# Patient Record
Sex: FEMALE | Race: WHITE | NOT HISPANIC OR LATINO | Employment: PART TIME | URBAN - METROPOLITAN AREA
[De-identification: names, ages, dates, MRNs, and addresses within clinical notes are randomized per-mention and may not be internally consistent; named-entity substitution may affect disease eponyms.]

---

## 2018-09-11 ENCOUNTER — APPOINTMENT (OUTPATIENT)
Dept: URGENT CARE | Facility: CLINIC | Age: 33
End: 2018-09-11

## 2018-09-11 DIAGNOSIS — Z02.1 PRE-EMPLOYMENT HEALTH SCREENING EXAMINATION: Primary | ICD-10-CM

## 2018-09-11 PROCEDURE — 86480 TB TEST CELL IMMUN MEASURE: CPT | Performed by: PHYSICIAN ASSISTANT

## 2018-09-13 LAB
ANNOTATION COMMENT IMP: NORMAL
GAMMA INTERFERON BACKGROUND BLD IA-ACNC: 0.05 IU/ML
M TB IFN-G BLD-IMP: NEGATIVE
M TB IFN-G CD4+ BCKGRND COR BLD-ACNC: 0 IU/ML
M TB IFN-G CD4+ T-CELLS BLD-ACNC: 0.05 IU/ML
MITOGEN IGNF BLD-ACNC: 7.14 IU/ML
QUANTIFERON-TB GOLD IN TUBE: NORMAL
SERVICE CMNT-IMP: NORMAL

## 2018-10-12 ENCOUNTER — INITIAL PRENATAL (OUTPATIENT)
Dept: OBGYN CLINIC | Facility: CLINIC | Age: 33
End: 2018-10-12
Payer: COMMERCIAL

## 2018-10-12 ENCOUNTER — LAB REQUISITION (OUTPATIENT)
Dept: LAB | Facility: HOSPITAL | Age: 33
End: 2018-10-12
Payer: COMMERCIAL

## 2018-10-12 ENCOUNTER — LAB (OUTPATIENT)
Dept: LAB | Facility: AMBULARY SURGERY CENTER | Age: 33
End: 2018-10-12
Attending: OBSTETRICS & GYNECOLOGY
Payer: COMMERCIAL

## 2018-10-12 VITALS
SYSTOLIC BLOOD PRESSURE: 120 MMHG | BODY MASS INDEX: 32.09 KG/M2 | WEIGHT: 192.6 LBS | HEART RATE: 85 BPM | HEIGHT: 65 IN | DIASTOLIC BLOOD PRESSURE: 80 MMHG

## 2018-10-12 DIAGNOSIS — Z23 NEED FOR INFLUENZA VACCINATION: Primary | ICD-10-CM

## 2018-10-12 DIAGNOSIS — Z34.01 ENCOUNTER FOR SUPERVISION OF NORMAL FIRST PREGNANCY IN FIRST TRIMESTER: ICD-10-CM

## 2018-10-12 DIAGNOSIS — Z3A.01 LESS THAN 8 WEEKS GESTATION OF PREGNANCY: ICD-10-CM

## 2018-10-12 LAB
ABO GROUP BLD: NORMAL
BASOPHILS # BLD AUTO: 0.02 THOUSANDS/ΜL (ref 0–0.1)
BASOPHILS NFR BLD AUTO: 0 % (ref 0–1)
BILIRUB UR QL STRIP: NEGATIVE
BLD GP AB SCN SERPL QL: NEGATIVE
CLARITY UR: CLEAR
COLOR UR: YELLOW
EOSINOPHIL # BLD AUTO: 0.05 THOUSAND/ΜL (ref 0–0.61)
EOSINOPHIL NFR BLD AUTO: 1 % (ref 0–6)
ERYTHROCYTE [DISTWIDTH] IN BLOOD BY AUTOMATED COUNT: 11.9 % (ref 11.6–15.1)
GLUCOSE UR STRIP-MCNC: NEGATIVE MG/DL
HBV SURFACE AG SER QL: NORMAL
HCT VFR BLD AUTO: 40.2 % (ref 34.8–46.1)
HGB BLD-MCNC: 13.5 G/DL (ref 11.5–15.4)
HGB UR QL STRIP.AUTO: NEGATIVE
IMM GRANULOCYTES # BLD AUTO: 0.01 THOUSAND/UL (ref 0–0.2)
IMM GRANULOCYTES NFR BLD AUTO: 0 % (ref 0–2)
KETONES UR STRIP-MCNC: NEGATIVE MG/DL
LEUKOCYTE ESTERASE UR QL STRIP: NEGATIVE
LYMPHOCYTES # BLD AUTO: 1.32 THOUSANDS/ΜL (ref 0.6–4.47)
LYMPHOCYTES NFR BLD AUTO: 22 % (ref 14–44)
MCH RBC QN AUTO: 32.1 PG (ref 26.8–34.3)
MCHC RBC AUTO-ENTMCNC: 33.6 G/DL (ref 31.4–37.4)
MCV RBC AUTO: 96 FL (ref 82–98)
MONOCYTES # BLD AUTO: 0.47 THOUSAND/ΜL (ref 0.17–1.22)
MONOCYTES NFR BLD AUTO: 8 % (ref 4–12)
NEUTROPHILS # BLD AUTO: 4.2 THOUSANDS/ΜL (ref 1.85–7.62)
NEUTS SEG NFR BLD AUTO: 69 % (ref 43–75)
NITRITE UR QL STRIP: NEGATIVE
NRBC BLD AUTO-RTO: 0 /100 WBCS
PH UR STRIP.AUTO: 6.5 [PH] (ref 4.5–8)
PLATELET # BLD AUTO: 261 THOUSANDS/UL (ref 149–390)
PMV BLD AUTO: 10.4 FL (ref 8.9–12.7)
PROT UR STRIP-MCNC: NEGATIVE MG/DL
RBC # BLD AUTO: 4.21 MILLION/UL (ref 3.81–5.12)
RH BLD: POSITIVE
RUBV IGG SERPL IA-ACNC: 80.9 IU/ML
SP GR UR STRIP.AUTO: 1.02 (ref 1–1.03)
SPECIMEN EXPIRATION DATE: NORMAL
UROBILINOGEN UR QL STRIP.AUTO: 0.2 E.U./DL
WBC # BLD AUTO: 6.07 THOUSAND/UL (ref 4.31–10.16)

## 2018-10-12 PROCEDURE — 36415 COLL VENOUS BLD VENIPUNCTURE: CPT

## 2018-10-12 PROCEDURE — 86787 VARICELLA-ZOSTER ANTIBODY: CPT

## 2018-10-12 PROCEDURE — OBC: Performed by: OBSTETRICS & GYNECOLOGY

## 2018-10-12 PROCEDURE — 81003 URINALYSIS AUTO W/O SCOPE: CPT

## 2018-10-12 PROCEDURE — 90686 IIV4 VACC NO PRSV 0.5 ML IM: CPT | Performed by: OBSTETRICS & GYNECOLOGY

## 2018-10-12 PROCEDURE — 87086 URINE CULTURE/COLONY COUNT: CPT

## 2018-10-12 PROCEDURE — 90471 IMMUNIZATION ADMIN: CPT | Performed by: OBSTETRICS & GYNECOLOGY

## 2018-10-12 PROCEDURE — 80081 OBSTETRIC PANEL INC HIV TSTG: CPT

## 2018-10-12 PROCEDURE — 86778 TOXOPLASMA ANTIBODY IGM: CPT

## 2018-10-12 PROCEDURE — 86777 TOXOPLASMA ANTIBODY: CPT

## 2018-10-12 NOTE — PROGRESS NOTES
OB intake complete  Prenatal labs ordered including - prenatal panel, varicella, toxplasmosis, and ultrasound  S/S of pregnancy - nausea, vomiting, breast tenderness, and fatigue  Genetic screening reviewed -- unsure, cpt codes given  PHQ 9 screening results - 0  FOB involved and supportive   Flu vaccine given

## 2018-10-13 LAB
BACTERIA UR CULT: NORMAL
CLINICAL COMMENT: NORMAL
T GONDII IGG SERPL IA-ACNC: <3 IU/ML (ref 0–7.1)
T GONDII IGM SER IA-ACNC: <3 AU/ML (ref 0–7.9)

## 2018-10-15 ENCOUNTER — HOSPITAL ENCOUNTER (OUTPATIENT)
Dept: RADIOLOGY | Facility: HOSPITAL | Age: 33
Discharge: HOME | End: 2018-10-15
Attending: OBSTETRICS & GYNECOLOGY
Payer: COMMERCIAL

## 2018-10-15 DIAGNOSIS — Z3A.01 LESS THAN 8 WEEKS GESTATION OF PREGNANCY: ICD-10-CM

## 2018-10-15 DIAGNOSIS — Z34.01 ENCOUNTER FOR SUPERVISION OF NORMAL FIRST PREGNANCY IN FIRST TRIMESTER: ICD-10-CM

## 2018-10-15 LAB
HIV 1+2 AB+HIV1 P24 AG SERPL QL IA: NORMAL
RPR SER QL: NORMAL

## 2018-10-15 PROCEDURE — 76801 OB US < 14 WKS SINGLE FETUS: CPT

## 2018-10-16 ENCOUNTER — DOCUMENTATION (OUTPATIENT)
Dept: OBGYN CLINIC | Facility: CLINIC | Age: 33
End: 2018-10-16

## 2018-10-16 LAB — VZV IGG SER IA-ACNC: NORMAL

## 2018-10-23 ENCOUNTER — TELEPHONE (OUTPATIENT)
Dept: OBGYN CLINIC | Facility: CLINIC | Age: 33
End: 2018-10-23

## 2018-10-23 DIAGNOSIS — O21.9 NAUSEA AND VOMITING DURING PREGNANCY: Primary | ICD-10-CM

## 2018-10-23 RX ORDER — ONDANSETRON 4 MG/1
4 TABLET, FILM COATED ORAL EVERY 8 HOURS PRN
Qty: 20 TABLET | Refills: 0 | Status: SHIPPED | OUTPATIENT
Start: 2018-10-23 | End: 2019-02-25

## 2018-10-23 NOTE — TELEPHONE ENCOUNTER
9w1d c/o nausea and vomiting 4-5 times a day  Has tried Vit B6 and Unisom  Edy Griffin is making her way to drowsy  Reviewed tips for coping with nausea and vomiting  Would like something else to help with nausea  Routing to provider for advise

## 2018-11-06 ENCOUNTER — ROUTINE PRENATAL (OUTPATIENT)
Dept: OBGYN CLINIC | Facility: CLINIC | Age: 33
End: 2018-11-06

## 2018-11-06 VITALS — DIASTOLIC BLOOD PRESSURE: 74 MMHG | BODY MASS INDEX: 30.95 KG/M2 | WEIGHT: 186 LBS | SYSTOLIC BLOOD PRESSURE: 124 MMHG

## 2018-11-06 DIAGNOSIS — Z34.01 ENCOUNTER FOR SUPERVISION OF NORMAL FIRST PREGNANCY IN FIRST TRIMESTER: Primary | ICD-10-CM

## 2018-11-06 DIAGNOSIS — Z12.4 CERVICAL CANCER SCREENING: ICD-10-CM

## 2018-11-06 DIAGNOSIS — Z11.3 SCREENING FOR STD (SEXUALLY TRANSMITTED DISEASE): ICD-10-CM

## 2018-11-06 DIAGNOSIS — Z11.51 SCREENING FOR HPV (HUMAN PAPILLOMAVIRUS): ICD-10-CM

## 2018-11-06 PROCEDURE — 87624 HPV HI-RISK TYP POOLED RSLT: CPT | Performed by: OBSTETRICS & GYNECOLOGY

## 2018-11-06 PROCEDURE — 87591 N.GONORRHOEAE DNA AMP PROB: CPT | Performed by: OBSTETRICS & GYNECOLOGY

## 2018-11-06 PROCEDURE — 87491 CHLMYD TRACH DNA AMP PROBE: CPT | Performed by: OBSTETRICS & GYNECOLOGY

## 2018-11-06 PROCEDURE — PNV: Performed by: OBSTETRICS & GYNECOLOGY

## 2018-11-06 PROCEDURE — G0145 SCR C/V CYTO,THINLAYER,RESCR: HCPCS | Performed by: OBSTETRICS & GYNECOLOGY

## 2018-11-06 NOTE — PROGRESS NOTES
Pt is still very nauseous  Pt vomits daily, sometimes multiple times a day  PT is nervous  Pt due for pap and GC cultures today

## 2018-11-06 NOTE — PROGRESS NOTES
This is a 35 y o   at 11w1d by 8 wk US/LMP who presents for initial OB visit  She is having daily nausea and almost daily vomiting  Did not improve with B6/unisom  Some improvement with zofran  For the most part is not taking anything and eating small, bland meals  Denies cramping, vaginal bleeding  BP: 124/74  Ultrasound: [facility] IUP with cardiac activity  Normal appearing uterus    Pap/GCC performed  Prenatal labs reviewed and wnl  Practice policies reviewed  Weight gain, diet, exercise recommendations discussed     Genetic screening ordered as well as level 2 US  S/p flu shot at intake visit

## 2018-11-09 LAB
CHLAMYDIA DNA CVX QL NAA+PROBE: NEGATIVE
N GONORRHOEA DNA GENITAL QL NAA+PROBE: NEGATIVE

## 2018-11-13 LAB
HPV HR 12 DNA CVX QL NAA+PROBE: NEGATIVE
HPV16 DNA CVX QL NAA+PROBE: NEGATIVE
HPV18 DNA CVX QL NAA+PROBE: NEGATIVE

## 2018-11-16 ENCOUNTER — ROUTINE PRENATAL (OUTPATIENT)
Dept: PERINATAL CARE | Facility: CLINIC | Age: 33
End: 2018-11-16
Payer: COMMERCIAL

## 2018-11-16 VITALS
WEIGHT: 187.6 LBS | HEIGHT: 65 IN | SYSTOLIC BLOOD PRESSURE: 114 MMHG | HEART RATE: 108 BPM | BODY MASS INDEX: 31.25 KG/M2 | DIASTOLIC BLOOD PRESSURE: 83 MMHG

## 2018-11-16 DIAGNOSIS — Z36.82 ENCOUNTER FOR ANTENATAL SCREENING FOR NUCHAL TRANSLUCENCY: ICD-10-CM

## 2018-11-16 DIAGNOSIS — Z3A.12 12 WEEKS GESTATION OF PREGNANCY: ICD-10-CM

## 2018-11-16 DIAGNOSIS — O99.211 OBESITY AFFECTING PREGNANCY IN FIRST TRIMESTER: Primary | ICD-10-CM

## 2018-11-16 PROCEDURE — 76813 OB US NUCHAL MEAS 1 GEST: CPT | Performed by: OBSTETRICS & GYNECOLOGY

## 2018-11-16 PROCEDURE — 99201 PR OFFICE OUTPATIENT NEW 10 MINUTES: CPT | Performed by: OBSTETRICS & GYNECOLOGY

## 2018-11-16 NOTE — LETTER
November 17, 2018     DO Olga Pereyra 67  [address] [facility]    Patient: Jerel Kendrick   YOB: 1985   Date of Visit: 11/16/2018       Dear Dr Bettye Bullock:    Thank you for referring Jerel Kendrick to me for evaluation  Below are my notes for this consultation  If you have questions, please do not hesitate to call me  I look forward to following your patient along with you  Sincerely,        Fariha Hammond MD        CC: No Recipients  Fariha Hammond MD  11/17/2018 10:37 AM  Sign at close encounter  Please refer to the [facility] ultrasound report in Ob Procedures for additional information regarding the visit to the [facility]  today

## 2018-11-17 NOTE — PROGRESS NOTES
Please refer to the [facility] ultrasound report in Ob Procedures for additional information regarding the visit to the [facility]  today

## 2018-11-19 LAB
LAB AP GYN PRIMARY INTERPRETATION: NORMAL
Lab: NORMAL
PATH INTERP SPEC-IMP: NORMAL

## 2018-11-27 LAB
# FETUSES US: 1
AGE: NORMAL
B-HCG ADJ MOM SERPL: 1.24
B-HCG SERPL-ACNC: 81.7 IU/ML
COLLECT DATE: NORMAL
CURRENT SMOKER: NO
DONATED EGG PATIENT QL: NO
FET CRL US.MEAS: 66 MM
FET CRL US.MEAS: NORMAL MM
FET NUCHAL FOLD MOM THICKNESS US.MEAS: 1.08
FET NUCHAL FOLD THICKNESS US.MEAS: 1.6 MM
FET NUCHAL FOLD THICKNESS US.MEAS: NORMAL MM
FET TS 21 RISK FROM MAT AGE: NORMAL
GA CLIN EST: 13.3 WK
GA METHOD: NORMAL
HX OF NTD NARR: NO
HX OF TRISOMY 21 NARR: NO
IDDM PATIENT QL: NO
INTEGRATED SCN PATIENT-IMP: NORMAL
PAPP-A MOM SERPL: 1.08
PAPP-A SERPL-MCNC: 921.2 NG/ML
PHYSICIAN NPI: NORMAL
SL AMB NASAL BONE: PRESENT
SL AMB NTQR LOCATION ID#: NORMAL
SL AMB NTQR READING PHYS ID#: NORMAL
SL AMB REFERRING PHYSICIAN NAME: NORMAL
SL AMB REFERRING PHYSICIAN PHONE: NORMAL
SL AMB REPEAT SPECIMEN: NO
SL AMB TWIN B NASAL BONE: NORMAL
SONOGRAPHER NAME: NORMAL
SONOGRAPHER: NORMAL
SONOGRAPHER: NORMAL
TS 18 RISK FETUS: NORMAL
TS 21 RISK FETUS: NORMAL
US DATE: NORMAL
US FETUSES STUDY [IMP]: NORMAL

## 2018-12-03 ENCOUNTER — ROUTINE PRENATAL (OUTPATIENT)
Dept: OBGYN CLINIC | Facility: CLINIC | Age: 33
End: 2018-12-03

## 2018-12-03 VITALS — BODY MASS INDEX: 31.22 KG/M2 | WEIGHT: 187.6 LBS | SYSTOLIC BLOOD PRESSURE: 104 MMHG | DIASTOLIC BLOOD PRESSURE: 68 MMHG

## 2018-12-03 DIAGNOSIS — Z3A.15 15 WEEKS GESTATION OF PREGNANCY: ICD-10-CM

## 2018-12-03 DIAGNOSIS — Z34.82 ENCOUNTER FOR SUPERVISION OF OTHER NORMAL PREGNANCY, SECOND TRIMESTER: Primary | ICD-10-CM

## 2018-12-03 PROCEDURE — PNV: Performed by: OBSTETRICS & GYNECOLOGY

## 2018-12-03 NOTE — PROGRESS NOTES
35 y o    female at 13 wga EGA for PNV  BP : 104/68  TWG: -4  Feeling well    Occ vomits, but no nausea  Has level 2 scheduled  Had flu vaccine  F/u 4 weeks

## 2018-12-18 LAB
# FETUSES US: 1
AFP ADJ MOM SERPL: 1.16
AFP SERPL-MCNC: 34.7 NG/ML
B-HCG ADJ MOM SERPL: 0.74
B-HCG SERPL-ACNC: 20.5 IU/ML
COLLECT DATE: NORMAL
CURRENT SMOKER: NO
FET CRL US.MEAS: 66 MM
FET NUCHAL FOLD MOM THICKNESS US.MEAS: 1.08
FET NUCHAL FOLD THICKNESS US.MEAS: 1.6 MM
FET TS 21 RISK FROM MAT AGE: NORMAL
GA CLIN EST: 16.4 WK
HX OF NTD NARR: NO
IDDM PATIENT QL: NO
INHIBIN A ADJ MOM SERPL: 0.63
INHIBIN A SERPL-MCNC: 95 PG/ML
INTEGRATED SCN PATIENT-IMP: NORMAL
NEURAL TUBE DEFECT RISK FETUS: NORMAL %
PAPP-A MOM SERPL: 1.08
PAPP-A SERPL-MCNC: 921.2 NG/ML
PHYSICIAN NPI: NORMAL
SL AMB NASAL BONE: PRESENT
SL AMB REFERRING PHYSICIAN NAME: NORMAL
SL AMB REFERRING PHYSICIAN PHONE: NORMAL
SL AMB REPEAT SPECIMEN: NO
SL AMB SPECIMEN # FROM PART 1: NORMAL
SL AMB TWIN B NASAL BONE: NORMAL
TS 18 RISK FETUS: NORMAL
TS 21 RISK FETUS: NORMAL
U ESTRIOL ADJ MOM SERPL: 1.1
U ESTRIOL SERPL-MCNC: 0.87 NG/ML
US DATE: NORMAL

## 2018-12-19 ENCOUNTER — TELEPHONE (OUTPATIENT)
Dept: PERINATAL CARE | Facility: CLINIC | Age: 33
End: 2018-12-19

## 2018-12-19 NOTE — TELEPHONE ENCOUNTER
----- Message from Warden Sami MD sent at 12/18/2018  3:42 PM EST -----  I reviewed the lab study today and the results are normal

## 2019-01-03 ENCOUNTER — ROUTINE PRENATAL (OUTPATIENT)
Dept: OBGYN CLINIC | Facility: CLINIC | Age: 34
End: 2019-01-03

## 2019-01-03 VITALS — DIASTOLIC BLOOD PRESSURE: 70 MMHG | SYSTOLIC BLOOD PRESSURE: 110 MMHG | BODY MASS INDEX: 32.85 KG/M2 | WEIGHT: 197.4 LBS

## 2019-01-03 DIAGNOSIS — Z3A.19 19 WEEKS GESTATION OF PREGNANCY: Primary | ICD-10-CM

## 2019-01-03 PROCEDURE — PNV: Performed by: OBSTETRICS & GYNECOLOGY

## 2019-01-03 NOTE — PROGRESS NOTES
Beryle Aures is a 35y o  year old  at 19w3d for routine prenatal visit  BP: 110/70 TWlb  Level 2 scheduled      Obesity- will start daily LDASA  Precautions reviewed

## 2019-01-07 ENCOUNTER — ROUTINE PRENATAL (OUTPATIENT)
Dept: PERINATAL CARE | Facility: CLINIC | Age: 34
End: 2019-01-07
Payer: COMMERCIAL

## 2019-01-07 VITALS
DIASTOLIC BLOOD PRESSURE: 61 MMHG | BODY MASS INDEX: 33.45 KG/M2 | HEART RATE: 80 BPM | HEIGHT: 65 IN | WEIGHT: 200.8 LBS | SYSTOLIC BLOOD PRESSURE: 118 MMHG

## 2019-01-07 DIAGNOSIS — Z34.01 ENCOUNTER FOR SUPERVISION OF NORMAL FIRST PREGNANCY IN FIRST TRIMESTER: ICD-10-CM

## 2019-01-07 DIAGNOSIS — Z3A.19 19 WEEKS GESTATION OF PREGNANCY: ICD-10-CM

## 2019-01-07 DIAGNOSIS — Z36.86 ENCOUNTER FOR ANTENATAL SCREENING FOR CERVICAL LENGTH: ICD-10-CM

## 2019-01-07 DIAGNOSIS — Z34.82 ENCOUNTER FOR SUPERVISION OF OTHER NORMAL PREGNANCY, SECOND TRIMESTER: ICD-10-CM

## 2019-01-07 DIAGNOSIS — Z36.3 ENCOUNTER FOR ANTENATAL SCREENING FOR MALFORMATION USING ULTRASOUND: Primary | ICD-10-CM

## 2019-01-07 PROCEDURE — 76805 OB US >/= 14 WKS SNGL FETUS: CPT | Performed by: OBSTETRICS & GYNECOLOGY

## 2019-01-07 PROCEDURE — 99212 OFFICE O/P EST SF 10 MIN: CPT | Performed by: OBSTETRICS & GYNECOLOGY

## 2019-01-07 PROCEDURE — 76817 TRANSVAGINAL US OBSTETRIC: CPT | Performed by: OBSTETRICS & GYNECOLOGY

## 2019-01-07 NOTE — PROGRESS NOTES
The patient was seen today for an ultrasound  Please see ultrasound report (located under Ob Procedures) for additional details  Thank you very much for allowing us to participate in the care of this very nice patient  Should you have any questions, please do not hesitate to contact me  Neo Omer MD [address] [facility]  Attending Physician, Jem

## 2019-01-07 NOTE — PROGRESS NOTES
A transvaginal ultrasound was performed   Sonographer note on use of High Level Disinfection Process (Trophon) for transvaginal probe# 3 used, serial # [id number]  Rosa Elena Rivera RDMS

## 2019-01-28 ENCOUNTER — ROUTINE PRENATAL (OUTPATIENT)
Dept: OBGYN CLINIC | Facility: CLINIC | Age: 34
End: 2019-01-28

## 2019-01-28 VITALS — WEIGHT: 205.6 LBS | BODY MASS INDEX: 34.21 KG/M2 | SYSTOLIC BLOOD PRESSURE: 118 MMHG | DIASTOLIC BLOOD PRESSURE: 64 MMHG

## 2019-01-28 DIAGNOSIS — Z3A.23 23 WEEKS GESTATION OF PREGNANCY: ICD-10-CM

## 2019-01-28 DIAGNOSIS — Z34.02 ENCOUNTER FOR SUPERVISION OF NORMAL FIRST PREGNANCY IN SECOND TRIMESTER: Primary | ICD-10-CM

## 2019-01-28 PROCEDURE — PNV: Performed by: OBSTETRICS & GYNECOLOGY

## 2019-01-28 NOTE — PROGRESS NOTES
35 y o    female at 21 wga EGA for PNV  BP : 118/64  TW  Patient is feeling well  Her only complaint is sore feet    This is stretching an opening her feet up when she is sitting  Gave 28 week labs  Reviewed  labor precautions   Follow-up in 4 weeks

## 2019-02-07 ENCOUNTER — TELEPHONE (OUTPATIENT)
Dept: OBGYN CLINIC | Facility: CLINIC | Age: 34
End: 2019-02-07

## 2019-02-19 LAB
BASOPHILS # BLD AUTO: 29 CELLS/UL (ref 0–200)
BASOPHILS NFR BLD AUTO: 0.3 %
EOSINOPHIL # BLD AUTO: 108 CELLS/UL (ref 15–500)
EOSINOPHIL NFR BLD AUTO: 1.1 %
ERYTHROCYTE [DISTWIDTH] IN BLOOD BY AUTOMATED COUNT: 11.9 % (ref 11–15)
GLUCOSE 1H P 50 G GLC PO SERPL-MCNC: 80 MG/DL
HCT VFR BLD AUTO: 37.2 % (ref 35–45)
HGB BLD-MCNC: 12.8 G/DL (ref 11.7–15.5)
LYMPHOCYTES # BLD AUTO: 1313 CELLS/UL (ref 850–3900)
LYMPHOCYTES NFR BLD AUTO: 13.4 %
MCH RBC QN AUTO: 33.8 PG (ref 27–33)
MCHC RBC AUTO-ENTMCNC: 34.4 G/DL (ref 32–36)
MCV RBC AUTO: 98.2 FL (ref 80–100)
MONOCYTES # BLD AUTO: 559 CELLS/UL (ref 200–950)
MONOCYTES NFR BLD AUTO: 5.7 %
NEUTROPHILS # BLD AUTO: 7791 CELLS/UL (ref 1500–7800)
NEUTROPHILS NFR BLD AUTO: 79.5 %
PLATELET # BLD AUTO: 235 THOUSAND/UL (ref 140–400)
PMV BLD REES-ECKER: 10.4 FL (ref 7.5–12.5)
RBC # BLD AUTO: 3.79 MILLION/UL (ref 3.8–5.1)
RPR SER QL: NORMAL
WBC # BLD AUTO: 9.8 THOUSAND/UL (ref 3.8–10.8)

## 2019-02-25 ENCOUNTER — ROUTINE PRENATAL (OUTPATIENT)
Dept: OBGYN CLINIC | Facility: CLINIC | Age: 34
End: 2019-02-25

## 2019-02-25 VITALS — WEIGHT: 210 LBS | BODY MASS INDEX: 34.95 KG/M2 | SYSTOLIC BLOOD PRESSURE: 122 MMHG | DIASTOLIC BLOOD PRESSURE: 74 MMHG

## 2019-02-25 DIAGNOSIS — Z34.82 ENCOUNTER FOR SUPERVISION OF OTHER NORMAL PREGNANCY, SECOND TRIMESTER: ICD-10-CM

## 2019-02-25 DIAGNOSIS — Z3A.27 27 WEEKS GESTATION OF PREGNANCY: ICD-10-CM

## 2019-02-25 PROCEDURE — PNV: Performed by: OBSTETRICS & GYNECOLOGY

## 2019-02-25 NOTE — PROGRESS NOTES
This is a 29 y o   at 27w0d who presents for return OB visit  No complaints  Denies contractions, leakage, bleeding  Endorses fetal movement  BP: 122/74 TWlb   28 wk labs reviewed  TDAP offered - would like to get next visit  Rhogam not indicated  [address] reviewed  Baby & Me booklet info reviewed  Skin to skin, rooming in, delayed cord clamp,  pain management in labor discussed  Consent signed  Full code   OK with transfusion  F/up 2 wks

## 2019-03-13 ENCOUNTER — ROUTINE PRENATAL (OUTPATIENT)
Dept: OBGYN CLINIC | Facility: CLINIC | Age: 34
End: 2019-03-13
Payer: COMMERCIAL

## 2019-03-13 VITALS — BODY MASS INDEX: 35.94 KG/M2 | SYSTOLIC BLOOD PRESSURE: 124 MMHG | DIASTOLIC BLOOD PRESSURE: 82 MMHG | WEIGHT: 216 LBS

## 2019-03-13 DIAGNOSIS — Z3A.32 32 WEEKS GESTATION OF PREGNANCY: ICD-10-CM

## 2019-03-13 DIAGNOSIS — Z3A.27 27 WEEKS GESTATION OF PREGNANCY: ICD-10-CM

## 2019-03-13 DIAGNOSIS — Z23 NEED FOR TDAP VACCINATION: Primary | ICD-10-CM

## 2019-03-13 DIAGNOSIS — Z34.82 ENCOUNTER FOR SUPERVISION OF OTHER NORMAL PREGNANCY, SECOND TRIMESTER: ICD-10-CM

## 2019-03-13 PROCEDURE — 90715 TDAP VACCINE 7 YRS/> IM: CPT | Performed by: OBSTETRICS & GYNECOLOGY

## 2019-03-13 PROCEDURE — 90471 IMMUNIZATION ADMIN: CPT | Performed by: OBSTETRICS & GYNECOLOGY

## 2019-03-13 PROCEDURE — PNV: Performed by: OBSTETRICS & GYNECOLOGY

## 2019-03-13 NOTE — PROGRESS NOTES
Vivek Villela is a 29y o  year old  at 26w3d for routine prenatal visit     BP: 124/82 TWlb  FKC reviewed  No current complaints  tdap today  Repeat ultrasound at 32 weeks

## 2019-04-01 ENCOUNTER — ULTRASOUND (OUTPATIENT)
Dept: PERINATAL CARE | Facility: CLINIC | Age: 34
End: 2019-04-01
Payer: COMMERCIAL

## 2019-04-01 VITALS
WEIGHT: 222 LBS | HEIGHT: 65 IN | HEART RATE: 112 BPM | DIASTOLIC BLOOD PRESSURE: 85 MMHG | SYSTOLIC BLOOD PRESSURE: 122 MMHG | BODY MASS INDEX: 36.99 KG/M2

## 2019-04-01 DIAGNOSIS — Z3A.32 32 WEEKS GESTATION OF PREGNANCY: ICD-10-CM

## 2019-04-01 DIAGNOSIS — O99.213 MATERNAL OBESITY, ANTEPARTUM, THIRD TRIMESTER: Primary | ICD-10-CM

## 2019-04-01 DIAGNOSIS — Z34.82 ENCOUNTER FOR SUPERVISION OF OTHER NORMAL PREGNANCY, SECOND TRIMESTER: ICD-10-CM

## 2019-04-01 PROCEDURE — 99212 OFFICE O/P EST SF 10 MIN: CPT | Performed by: OBSTETRICS & GYNECOLOGY

## 2019-04-01 PROCEDURE — 76816 OB US FOLLOW-UP PER FETUS: CPT | Performed by: OBSTETRICS & GYNECOLOGY

## 2019-04-03 ENCOUNTER — ROUTINE PRENATAL (OUTPATIENT)
Dept: OBGYN CLINIC | Facility: CLINIC | Age: 34
End: 2019-04-03

## 2019-04-03 VITALS — WEIGHT: 226.4 LBS | SYSTOLIC BLOOD PRESSURE: 118 MMHG | DIASTOLIC BLOOD PRESSURE: 66 MMHG | BODY MASS INDEX: 37.67 KG/M2

## 2019-04-03 DIAGNOSIS — Z3A.32 32 WEEKS GESTATION OF PREGNANCY: Primary | ICD-10-CM

## 2019-04-03 DIAGNOSIS — Z34.03 ENCOUNTER FOR SUPERVISION OF NORMAL FIRST PREGNANCY IN THIRD TRIMESTER: ICD-10-CM

## 2019-04-03 PROCEDURE — PNV: Performed by: OBSTETRICS & GYNECOLOGY

## 2019-04-15 PROBLEM — Z3A.34 34 WEEKS GESTATION OF PREGNANCY: Status: ACTIVE | Noted: 2019-03-13

## 2019-04-17 ENCOUNTER — ROUTINE PRENATAL (OUTPATIENT)
Dept: OBGYN CLINIC | Facility: CLINIC | Age: 34
End: 2019-04-17

## 2019-04-17 VITALS — WEIGHT: 225 LBS | SYSTOLIC BLOOD PRESSURE: 122 MMHG | DIASTOLIC BLOOD PRESSURE: 74 MMHG | BODY MASS INDEX: 37.44 KG/M2

## 2019-04-17 DIAGNOSIS — Z3A.34 34 WEEKS GESTATION OF PREGNANCY: Primary | ICD-10-CM

## 2019-04-17 DIAGNOSIS — Z34.82 ENCOUNTER FOR SUPERVISION OF OTHER NORMAL PREGNANCY, SECOND TRIMESTER: ICD-10-CM

## 2019-04-17 PROCEDURE — PNV: Performed by: OBSTETRICS & GYNECOLOGY

## 2019-04-29 ENCOUNTER — ROUTINE PRENATAL (OUTPATIENT)
Dept: OBGYN CLINIC | Facility: CLINIC | Age: 34
End: 2019-04-29

## 2019-04-29 VITALS — DIASTOLIC BLOOD PRESSURE: 74 MMHG | WEIGHT: 227 LBS | SYSTOLIC BLOOD PRESSURE: 122 MMHG | BODY MASS INDEX: 37.77 KG/M2

## 2019-04-29 DIAGNOSIS — Z3A.36 36 WEEKS GESTATION OF PREGNANCY: ICD-10-CM

## 2019-04-29 DIAGNOSIS — Z34.82 ENCOUNTER FOR SUPERVISION OF OTHER NORMAL PREGNANCY, SECOND TRIMESTER: ICD-10-CM

## 2019-04-29 DIAGNOSIS — Z34.03 ENCOUNTER FOR SUPERVISION OF NORMAL FIRST PREGNANCY IN THIRD TRIMESTER: Primary | ICD-10-CM

## 2019-04-29 PROCEDURE — PNV: Performed by: OBSTETRICS & GYNECOLOGY

## 2019-04-29 PROCEDURE — 87653 STREP B DNA AMP PROBE: CPT | Performed by: OBSTETRICS & GYNECOLOGY

## 2019-05-01 ENCOUNTER — TELEPHONE (OUTPATIENT)
Dept: OBGYN CLINIC | Facility: CLINIC | Age: 34
End: 2019-05-01

## 2019-05-02 ENCOUNTER — OFFICE VISIT (OUTPATIENT)
Dept: URGENT CARE | Facility: CLINIC | Age: 34
End: 2019-05-02
Payer: COMMERCIAL

## 2019-05-02 VITALS
RESPIRATION RATE: 18 BRPM | OXYGEN SATURATION: 97 % | SYSTOLIC BLOOD PRESSURE: 131 MMHG | HEART RATE: 107 BPM | HEIGHT: 66 IN | DIASTOLIC BLOOD PRESSURE: 80 MMHG | WEIGHT: 225.8 LBS | TEMPERATURE: 97.7 F | BODY MASS INDEX: 36.29 KG/M2

## 2019-05-02 DIAGNOSIS — R19.7 DIARRHEA, UNSPECIFIED TYPE: Primary | ICD-10-CM

## 2019-05-02 LAB — GP B STREP DNA SPEC QL NAA+PROBE: NORMAL

## 2019-05-02 PROCEDURE — G0382 LEV 3 HOSP TYPE B ED VISIT: HCPCS | Performed by: PHYSICIAN ASSISTANT

## 2019-05-09 ENCOUNTER — ROUTINE PRENATAL (OUTPATIENT)
Dept: OBGYN CLINIC | Facility: CLINIC | Age: 34
End: 2019-05-09

## 2019-05-09 VITALS — SYSTOLIC BLOOD PRESSURE: 118 MMHG | BODY MASS INDEX: 36.96 KG/M2 | DIASTOLIC BLOOD PRESSURE: 86 MMHG | WEIGHT: 229 LBS

## 2019-05-09 DIAGNOSIS — Z3A.37 37 WEEKS GESTATION OF PREGNANCY: ICD-10-CM

## 2019-05-09 DIAGNOSIS — Z34.03 ENCOUNTER FOR SUPERVISION OF NORMAL FIRST PREGNANCY IN THIRD TRIMESTER: ICD-10-CM

## 2019-05-09 PROBLEM — Z34.93 ENCOUNTER FOR SUPERVISION OF NORMAL PREGNANCY IN THIRD TRIMESTER: Status: ACTIVE | Noted: 2018-12-03

## 2019-05-09 PROCEDURE — PNV: Performed by: OBSTETRICS & GYNECOLOGY

## 2019-05-15 ENCOUNTER — ROUTINE PRENATAL (OUTPATIENT)
Dept: OBGYN CLINIC | Facility: CLINIC | Age: 34
End: 2019-05-15

## 2019-05-15 VITALS — BODY MASS INDEX: 37.28 KG/M2 | DIASTOLIC BLOOD PRESSURE: 64 MMHG | SYSTOLIC BLOOD PRESSURE: 108 MMHG | WEIGHT: 231 LBS

## 2019-05-15 DIAGNOSIS — Z34.03 ENCOUNTER FOR SUPERVISION OF NORMAL FIRST PREGNANCY IN THIRD TRIMESTER: ICD-10-CM

## 2019-05-15 DIAGNOSIS — Z3A.38 38 WEEKS GESTATION OF PREGNANCY: ICD-10-CM

## 2019-05-15 PROCEDURE — PNV: Performed by: OBSTETRICS & GYNECOLOGY

## 2019-05-21 ENCOUNTER — ROUTINE PRENATAL (OUTPATIENT)
Dept: OBGYN CLINIC | Facility: CLINIC | Age: 34
End: 2019-05-21

## 2019-05-21 VITALS — WEIGHT: 236.6 LBS | DIASTOLIC BLOOD PRESSURE: 70 MMHG | SYSTOLIC BLOOD PRESSURE: 122 MMHG | BODY MASS INDEX: 38.19 KG/M2

## 2019-05-21 DIAGNOSIS — Z3A.39 39 WEEKS GESTATION OF PREGNANCY: Primary | ICD-10-CM

## 2019-05-21 DIAGNOSIS — Z34.03 ENCOUNTER FOR SUPERVISION OF NORMAL FIRST PREGNANCY IN THIRD TRIMESTER: ICD-10-CM

## 2019-05-21 PROCEDURE — PNV: Performed by: OBSTETRICS & GYNECOLOGY

## 2019-05-28 ENCOUNTER — ROUTINE PRENATAL (OUTPATIENT)
Dept: OBGYN CLINIC | Facility: CLINIC | Age: 34
End: 2019-05-28

## 2019-05-28 VITALS — WEIGHT: 238 LBS | BODY MASS INDEX: 38.41 KG/M2 | DIASTOLIC BLOOD PRESSURE: 68 MMHG | SYSTOLIC BLOOD PRESSURE: 122 MMHG

## 2019-05-28 DIAGNOSIS — Z34.03 ENCOUNTER FOR SUPERVISION OF NORMAL FIRST PREGNANCY IN THIRD TRIMESTER: Primary | ICD-10-CM

## 2019-05-28 DIAGNOSIS — Z3A.40 40 WEEKS GESTATION OF PREGNANCY: ICD-10-CM

## 2019-05-28 PROCEDURE — PNV: Performed by: OBSTETRICS & GYNECOLOGY

## 2019-05-29 PROBLEM — Z3A.40 40 WEEKS GESTATION OF PREGNANCY: Status: ACTIVE | Noted: 2019-03-13

## 2019-05-30 ENCOUNTER — HOSPITAL ENCOUNTER (INPATIENT)
Facility: HOSPITAL | Age: 34
LOS: 2 days | Discharge: HOME | End: 2019-06-02
Attending: OBSTETRICS & GYNECOLOGY | Admitting: OBSTETRICS & GYNECOLOGY
Payer: COMMERCIAL

## 2019-05-30 ENCOUNTER — TELEPHONE (OUTPATIENT)
Dept: OBGYN CLINIC | Facility: CLINIC | Age: 34
End: 2019-05-30

## 2019-05-30 ENCOUNTER — HOSPITAL ENCOUNTER (OUTPATIENT)
Facility: HOSPITAL | Age: 34
Discharge: HOME | End: 2019-05-30
Attending: OBSTETRICS & GYNECOLOGY | Admitting: OBSTETRICS & GYNECOLOGY
Payer: COMMERCIAL

## 2019-05-30 VITALS
DIASTOLIC BLOOD PRESSURE: 74 MMHG | HEIGHT: 65 IN | OXYGEN SATURATION: 97 % | WEIGHT: 238 LBS | HEART RATE: 81 BPM | BODY MASS INDEX: 39.65 KG/M2 | SYSTOLIC BLOOD PRESSURE: 117 MMHG | RESPIRATION RATE: 18 BRPM | TEMPERATURE: 98.2 F

## 2019-05-30 DIAGNOSIS — Z3A.40 40 WEEKS GESTATION OF PREGNANCY: ICD-10-CM

## 2019-05-30 DIAGNOSIS — F41.9 ANXIETY DISORDER: ICD-10-CM

## 2019-05-30 PROCEDURE — 99213 OFFICE O/P EST LOW 20 MIN: CPT

## 2019-05-30 PROCEDURE — 99213 OFFICE O/P EST LOW 20 MIN: CPT | Performed by: OBSTETRICS & GYNECOLOGY

## 2019-05-30 RX ORDER — DIPHENHYDRAMINE HYDROCHLORIDE 50 MG/ML
25 INJECTION INTRAMUSCULAR; INTRAVENOUS ONCE
Status: COMPLETED | OUTPATIENT
Start: 2019-05-30 | End: 2019-05-30

## 2019-05-30 RX ORDER — HYDROMORPHONE HCL/PF 1 MG/ML
1 SYRINGE (ML) INJECTION ONCE
Status: COMPLETED | OUTPATIENT
Start: 2019-05-30 | End: 2019-05-30

## 2019-05-30 RX ADMIN — DIPHENHYDRAMINE HYDROCHLORIDE 25 MG: 50 INJECTION, SOLUTION INTRAMUSCULAR; INTRAVENOUS at 23:44

## 2019-05-30 RX ADMIN — HYDROMORPHONE HYDROCHLORIDE 1 MG: 1 INJECTION, SOLUTION INTRAMUSCULAR; INTRAVENOUS; SUBCUTANEOUS at 23:40

## 2019-05-31 ENCOUNTER — ANESTHESIA EVENT (INPATIENT)
Dept: ANESTHESIOLOGY | Facility: HOSPITAL | Age: 34
End: 2019-05-31
Payer: COMMERCIAL

## 2019-05-31 ENCOUNTER — ANESTHESIA (INPATIENT)
Dept: ANESTHESIOLOGY | Facility: HOSPITAL | Age: 34
End: 2019-05-31
Payer: COMMERCIAL

## 2019-05-31 LAB
ABO GROUP BLD: NORMAL
BASE EXCESS BLDCOA CALC-SCNC: -8 MMOL/L (ref 3–11)
BASE EXCESS BLDCOV CALC-SCNC: -5.1 MMOL/L (ref 1–9)
BASOPHILS # BLD AUTO: 0.03 THOUSANDS/ΜL (ref 0–0.1)
BASOPHILS NFR BLD AUTO: 0 % (ref 0–1)
BLD GP AB SCN SERPL QL: NEGATIVE
EOSINOPHIL # BLD AUTO: 0.05 THOUSAND/ΜL (ref 0–0.61)
EOSINOPHIL NFR BLD AUTO: 0 % (ref 0–6)
ERYTHROCYTE [DISTWIDTH] IN BLOOD BY AUTOMATED COUNT: 12.7 % (ref 11.6–15.1)
HCO3 BLDCOA-SCNC: 21.7 MMOL/L (ref 17.3–27.3)
HCO3 BLDCOV-SCNC: 21.9 MMOL/L (ref 12.2–28.6)
HCT VFR BLD AUTO: 37.6 % (ref 34.8–46.1)
HGB BLD-MCNC: 12.7 G/DL (ref 11.5–15.4)
IMM GRANULOCYTES # BLD AUTO: 0.08 THOUSAND/UL (ref 0–0.2)
IMM GRANULOCYTES NFR BLD AUTO: 1 % (ref 0–2)
LYMPHOCYTES # BLD AUTO: 1.22 THOUSANDS/ΜL (ref 0.6–4.47)
LYMPHOCYTES NFR BLD AUTO: 7 % (ref 14–44)
MCH RBC QN AUTO: 32.2 PG (ref 26.8–34.3)
MCHC RBC AUTO-ENTMCNC: 33.8 G/DL (ref 31.4–37.4)
MCV RBC AUTO: 95 FL (ref 82–98)
MONOCYTES # BLD AUTO: 1.01 THOUSAND/ΜL (ref 0.17–1.22)
MONOCYTES NFR BLD AUTO: 6 % (ref 4–12)
NEUTROPHILS # BLD AUTO: 14.62 THOUSANDS/ΜL (ref 1.85–7.62)
NEUTS SEG NFR BLD AUTO: 86 % (ref 43–75)
NRBC BLD AUTO-RTO: 0 /100 WBCS
O2 CT VFR BLDCOA CALC: 6 ML/DL
OXYHGB MFR BLDCOA: 29.2 %
OXYHGB MFR BLDCOV: 47.8 %
PCO2 BLDCOA: 62.8 MM[HG] (ref 30–60)
PCO2 BLDCOV: 48 MM HG (ref 27–43)
PH BLDCOA: 7.16 [PH] (ref 7.23–7.43)
PH BLDCOV: 7.28 [PH] (ref 7.19–7.49)
PLATELET # BLD AUTO: 222 THOUSANDS/UL (ref 149–390)
PMV BLD AUTO: 12.1 FL (ref 8.9–12.7)
PO2 BLDCOA: 19.3 MM HG (ref 5–25)
PO2 BLDCOV: 21.3 MM HG (ref 15–45)
RBC # BLD AUTO: 3.94 MILLION/UL (ref 3.81–5.12)
RH BLD: POSITIVE
RPR SER QL: NORMAL
SAO2 % BLDCOV: 10.2 ML/DL
SPECIMEN EXPIRATION DATE: NORMAL
WBC # BLD AUTO: 17.01 THOUSAND/UL (ref 4.31–10.16)

## 2019-05-31 PROCEDURE — 86901 BLOOD TYPING SEROLOGIC RH(D): CPT | Performed by: OBSTETRICS & GYNECOLOGY

## 2019-05-31 PROCEDURE — 86900 BLOOD TYPING SEROLOGIC ABO: CPT | Performed by: OBSTETRICS & GYNECOLOGY

## 2019-05-31 PROCEDURE — 59400 OBSTETRICAL CARE: CPT | Performed by: OBSTETRICS & GYNECOLOGY

## 2019-05-31 PROCEDURE — 86850 RBC ANTIBODY SCREEN: CPT | Performed by: OBSTETRICS & GYNECOLOGY

## 2019-05-31 PROCEDURE — 0HQ9XZZ REPAIR PERINEUM SKIN, EXTERNAL APPROACH: ICD-10-PCS | Performed by: OBSTETRICS & GYNECOLOGY

## 2019-05-31 PROCEDURE — 99024 POSTOP FOLLOW-UP VISIT: CPT | Performed by: OBSTETRICS & GYNECOLOGY

## 2019-05-31 PROCEDURE — NC001 PR NO CHARGE: Performed by: OBSTETRICS & GYNECOLOGY

## 2019-05-31 PROCEDURE — 85025 COMPLETE CBC W/AUTO DIFF WBC: CPT | Performed by: OBSTETRICS & GYNECOLOGY

## 2019-05-31 PROCEDURE — 86592 SYPHILIS TEST NON-TREP QUAL: CPT | Performed by: OBSTETRICS & GYNECOLOGY

## 2019-05-31 PROCEDURE — 82805 BLOOD GASES W/O2 SATURATION: CPT | Performed by: OBSTETRICS & GYNECOLOGY

## 2019-05-31 RX ORDER — SIMETHICONE 80 MG
80 TABLET,CHEWABLE ORAL 4 TIMES DAILY PRN
Status: DISCONTINUED | OUTPATIENT
Start: 2019-05-31 | End: 2019-06-02 | Stop reason: HOSPADM

## 2019-05-31 RX ORDER — ONDANSETRON 2 MG/ML
4 INJECTION INTRAMUSCULAR; INTRAVENOUS EVERY 8 HOURS PRN
Status: DISCONTINUED | OUTPATIENT
Start: 2019-05-31 | End: 2019-06-02 | Stop reason: HOSPADM

## 2019-05-31 RX ORDER — OXYTOCIN/RINGER'S LACTATE 30/500 ML
1-30 PLASTIC BAG, INJECTION (ML) INTRAVENOUS
Status: DISCONTINUED | OUTPATIENT
Start: 2019-05-31 | End: 2019-05-31

## 2019-05-31 RX ORDER — DOCUSATE SODIUM 100 MG/1
100 CAPSULE, LIQUID FILLED ORAL 2 TIMES DAILY
Status: DISCONTINUED | OUTPATIENT
Start: 2019-05-31 | End: 2019-06-02 | Stop reason: HOSPADM

## 2019-05-31 RX ORDER — IBUPROFEN 600 MG/1
600 TABLET ORAL EVERY 6 HOURS PRN
Status: DISCONTINUED | OUTPATIENT
Start: 2019-05-31 | End: 2019-06-02 | Stop reason: HOSPADM

## 2019-05-31 RX ORDER — OXYTOCIN/RINGER'S LACTATE 30/500 ML
250 PLASTIC BAG, INJECTION (ML) INTRAVENOUS CONTINUOUS
Status: ACTIVE | OUTPATIENT
Start: 2019-05-31 | End: 2019-05-31

## 2019-05-31 RX ORDER — LIDOCAINE HYDROCHLORIDE AND EPINEPHRINE 15; 5 MG/ML; UG/ML
INJECTION, SOLUTION EPIDURAL
Status: COMPLETED | OUTPATIENT
Start: 2019-05-31 | End: 2019-05-31

## 2019-05-31 RX ORDER — OXYCODONE HYDROCHLORIDE AND ACETAMINOPHEN 5; 325 MG/1; MG/1
1 TABLET ORAL EVERY 4 HOURS PRN
Status: DISCONTINUED | OUTPATIENT
Start: 2019-05-31 | End: 2019-06-02 | Stop reason: HOSPADM

## 2019-05-31 RX ORDER — DIAPER,BRIEF,INFANT-TODD,DISP
1 EACH MISCELLANEOUS AS NEEDED
Status: DISCONTINUED | OUTPATIENT
Start: 2019-05-31 | End: 2019-06-02 | Stop reason: HOSPADM

## 2019-05-31 RX ORDER — DIPHENHYDRAMINE HYDROCHLORIDE 50 MG/ML
25 INJECTION INTRAMUSCULAR; INTRAVENOUS EVERY 6 HOURS PRN
Status: DISCONTINUED | OUTPATIENT
Start: 2019-05-31 | End: 2019-06-02 | Stop reason: HOSPADM

## 2019-05-31 RX ORDER — OXYCODONE HYDROCHLORIDE AND ACETAMINOPHEN 5; 325 MG/1; MG/1
2 TABLET ORAL EVERY 4 HOURS PRN
Status: DISCONTINUED | OUTPATIENT
Start: 2019-05-31 | End: 2019-06-02 | Stop reason: HOSPADM

## 2019-05-31 RX ORDER — SODIUM CHLORIDE, SODIUM LACTATE, POTASSIUM CHLORIDE, CALCIUM CHLORIDE 600; 310; 30; 20 MG/100ML; MG/100ML; MG/100ML; MG/100ML
125 INJECTION, SOLUTION INTRAVENOUS CONTINUOUS
Status: DISCONTINUED | OUTPATIENT
Start: 2019-05-31 | End: 2019-05-31

## 2019-05-31 RX ORDER — ACETAMINOPHEN 325 MG/1
650 TABLET ORAL EVERY 6 HOURS PRN
Status: DISCONTINUED | OUTPATIENT
Start: 2019-05-31 | End: 2019-06-02 | Stop reason: HOSPADM

## 2019-05-31 RX ORDER — CALCIUM CARBONATE 200(500)MG
1000 TABLET,CHEWABLE ORAL DAILY PRN
Status: DISCONTINUED | OUTPATIENT
Start: 2019-05-31 | End: 2019-06-02 | Stop reason: HOSPADM

## 2019-05-31 RX ADMIN — ROPIVACAINE HYDROCHLORIDE: 2 INJECTION, SOLUTION EPIDURAL; INFILTRATION at 14:14

## 2019-05-31 RX ADMIN — SODIUM CHLORIDE, SODIUM LACTATE, POTASSIUM CHLORIDE, AND CALCIUM CHLORIDE 999 ML/HR: .6; .31; .03; .02 INJECTION, SOLUTION INTRAVENOUS at 04:39

## 2019-05-31 RX ADMIN — SODIUM CHLORIDE, SODIUM LACTATE, POTASSIUM CHLORIDE, AND CALCIUM CHLORIDE 125 ML/HR: .6; .31; .03; .02 INJECTION, SOLUTION INTRAVENOUS at 16:41

## 2019-05-31 RX ADMIN — SODIUM CHLORIDE, SODIUM LACTATE, POTASSIUM CHLORIDE, AND CALCIUM CHLORIDE 125 ML/HR: .6; .31; .03; .02 INJECTION, SOLUTION INTRAVENOUS at 04:38

## 2019-05-31 RX ADMIN — HYDROCORTISONE 1 APPLICATION: 1 CREAM TOPICAL at 20:32

## 2019-05-31 RX ADMIN — SODIUM CHLORIDE, SODIUM LACTATE, POTASSIUM CHLORIDE, AND CALCIUM CHLORIDE 125 ML/HR: .6; .31; .03; .02 INJECTION, SOLUTION INTRAVENOUS at 13:41

## 2019-05-31 RX ADMIN — IBUPROFEN 600 MG: 600 TABLET ORAL at 20:32

## 2019-05-31 RX ADMIN — ROPIVACAINE HYDROCHLORIDE: 2 INJECTION, SOLUTION EPIDURAL; INFILTRATION at 05:15

## 2019-05-31 RX ADMIN — SODIUM CHLORIDE, SODIUM LACTATE, POTASSIUM CHLORIDE, AND CALCIUM CHLORIDE 125 ML/HR: .6; .31; .03; .02 INJECTION, SOLUTION INTRAVENOUS at 05:42

## 2019-05-31 RX ADMIN — LIDOCAINE HYDROCHLORIDE AND EPINEPHRINE 5 ML: 15; 5 INJECTION, SOLUTION EPIDURAL at 05:00

## 2019-05-31 RX ADMIN — Medication 2 MILLI-UNITS/MIN: at 08:02

## 2019-05-31 RX ADMIN — BENZOCAINE AND LEVOMENTHOL: 200; 5 SPRAY TOPICAL at 20:32

## 2019-05-31 RX ADMIN — WITCH HAZEL 1 PAD: 500 SOLUTION RECTAL; TOPICAL at 20:32

## 2019-06-01 PROCEDURE — 99024 POSTOP FOLLOW-UP VISIT: CPT | Performed by: OBSTETRICS & GYNECOLOGY

## 2019-06-01 RX ADMIN — IBUPROFEN 600 MG: 600 TABLET ORAL at 22:15

## 2019-06-01 RX ADMIN — DOCUSATE SODIUM 100 MG: 100 CAPSULE, LIQUID FILLED ORAL at 08:25

## 2019-06-01 RX ADMIN — DOCUSATE SODIUM 100 MG: 100 CAPSULE, LIQUID FILLED ORAL at 18:17

## 2019-06-01 RX ADMIN — IBUPROFEN 600 MG: 600 TABLET ORAL at 16:31

## 2019-06-01 RX ADMIN — IBUPROFEN 600 MG: 600 TABLET ORAL at 10:26

## 2019-06-01 RX ADMIN — IBUPROFEN 600 MG: 600 TABLET ORAL at 03:17

## 2019-06-02 VITALS
RESPIRATION RATE: 20 BRPM | TEMPERATURE: 97.9 F | OXYGEN SATURATION: 96 % | BODY MASS INDEX: 39.65 KG/M2 | WEIGHT: 238 LBS | HEART RATE: 69 BPM | DIASTOLIC BLOOD PRESSURE: 70 MMHG | HEIGHT: 65 IN | SYSTOLIC BLOOD PRESSURE: 125 MMHG

## 2019-06-02 PROCEDURE — 99024 POSTOP FOLLOW-UP VISIT: CPT | Performed by: OBSTETRICS & GYNECOLOGY

## 2019-06-02 RX ORDER — IBUPROFEN 600 MG/1
600 TABLET ORAL EVERY 6 HOURS PRN
Qty: 30 TABLET | Refills: 0 | Status: SHIPPED | OUTPATIENT
Start: 2019-06-02 | End: 2019-07-08 | Stop reason: ALTCHOICE

## 2019-06-02 RX ORDER — ACETAMINOPHEN 325 MG/1
650 TABLET ORAL EVERY 6 HOURS PRN
Qty: 30 TABLET | Refills: 0 | Status: SHIPPED | OUTPATIENT
Start: 2019-06-02 | End: 2019-07-08 | Stop reason: ALTCHOICE

## 2019-06-02 RX ORDER — DOCUSATE SODIUM 100 MG/1
100 CAPSULE, LIQUID FILLED ORAL 2 TIMES DAILY
Qty: 10 CAPSULE | Refills: 0 | Status: SHIPPED | OUTPATIENT
Start: 2019-06-02 | End: 2019-10-08 | Stop reason: ALTCHOICE

## 2019-06-02 RX ORDER — DIAPER,BRIEF,INFANT-TODD,DISP
1 EACH MISCELLANEOUS 4 TIMES DAILY PRN
Qty: 30 G | Refills: 0 | Status: SHIPPED | OUTPATIENT
Start: 2019-06-02 | End: 2019-07-08 | Stop reason: ALTCHOICE

## 2019-06-02 RX ADMIN — ACETAMINOPHEN 650 MG: 325 TABLET ORAL at 03:19

## 2019-06-02 RX ADMIN — IBUPROFEN 600 MG: 600 TABLET ORAL at 05:30

## 2019-06-02 RX ADMIN — DOCUSATE SODIUM 100 MG: 100 CAPSULE, LIQUID FILLED ORAL at 10:08

## 2019-06-06 ENCOUNTER — OFFICE VISIT (OUTPATIENT)
Dept: POSTPARTUM | Facility: CLINIC | Age: 34
End: 2019-06-06
Payer: COMMERCIAL

## 2019-06-06 VITALS — DIASTOLIC BLOOD PRESSURE: 90 MMHG | SYSTOLIC BLOOD PRESSURE: 128 MMHG

## 2019-06-06 DIAGNOSIS — Z71.89 ENCOUNTER FOR BREAST FEEDING COUNSELING: Primary | ICD-10-CM

## 2019-06-06 DIAGNOSIS — R52 PAIN AGGRAVATED BY BREAST FEEDING: ICD-10-CM

## 2019-06-06 DIAGNOSIS — O92.79 PAIN AGGRAVATED BY BREAST FEEDING: ICD-10-CM

## 2019-06-06 DIAGNOSIS — O92.13 CRACKED NIPPLE ASSOCIATED WITH LACTATION: ICD-10-CM

## 2019-06-06 PROCEDURE — 99404 PREV MED CNSL INDIV APPRX 60: CPT | Performed by: PEDIATRICS

## 2019-06-13 LAB — PLACENTA IN STORAGE: NORMAL

## 2019-07-08 ENCOUNTER — POSTPARTUM VISIT (OUTPATIENT)
Dept: OBGYN CLINIC | Facility: CLINIC | Age: 34
End: 2019-07-08

## 2019-07-08 VITALS — DIASTOLIC BLOOD PRESSURE: 64 MMHG | BODY MASS INDEX: 37.01 KG/M2 | SYSTOLIC BLOOD PRESSURE: 116 MMHG | WEIGHT: 222.4 LBS

## 2019-07-08 PROCEDURE — 99024 POSTOP FOLLOW-UP VISIT: CPT | Performed by: OBSTETRICS & GYNECOLOGY

## 2019-07-09 PROBLEM — Z34.93 ENCOUNTER FOR SUPERVISION OF NORMAL PREGNANCY IN THIRD TRIMESTER: Status: RESOLVED | Noted: 2018-12-03 | Resolved: 2019-07-09

## 2019-07-09 PROBLEM — Z3A.40 40 WEEKS GESTATION OF PREGNANCY: Status: RESOLVED | Noted: 2019-03-13 | Resolved: 2019-07-09

## 2019-07-09 NOTE — PROGRESS NOTES
Assessment/Plan     Normal postpartum exam      1  Contraception: coitus interruptus and condoms  2  Annual exam due in October  3  Lactation consult, [address] information discussed  4  Increase activity as tolerated, may resume all normal activity  5  Anticipated return to work: 6 - 12 weeks post partum  Milton Britt is a 29 y o  female who presents for a postpartum visit  She is 6 weeks postpartum following a spontaneous vaginal delivery  I have fully reviewed the prenatal and intrapartum course  The delivery was at 40 gestational weeks  Anesthesia: epidural  Laceration: 1st degree  Bleeding no bleeding  Bowel function is normal  Bladder function is normal  Patient has not been sexually active  Desired contraception method is coitus interruptus and condoms  Postpartum depression screening: negative  EPDS : 4    Baby's course has been uneventful  Baby is feeding by breast     Last Pap : 2018 ; no abnormalities  Gestational Diabetes: no  Pregnancy Complications: none    The following portions of the patient's history were reviewed and updated as appropriate: allergies, current medications, past family history, past medical history, past social history, past surgical history and problem list       Current Outpatient Medications:     docusate sodium (COLACE) 100 mg capsule, Take 1 capsule (100 mg total) by mouth 2 (two) times a day, Disp: 10 capsule, Rfl: 0    PRENATAL-DSS-CA-FE CBN-FA-DHA PO, Take 1 tablet by mouth daily, Disp: , Rfl:     witch hazel-glycerin (TUCKS) topical pad, Apply 1 pad topically as needed for irritation (Patient not taking: Reported on 7/8/2019), Disp: 1 each, Rfl: 0    No Known Allergies    Review of Systems  Constitutional: no fever, feels well  Breasts: no complaints of breast pain, breast lump, or nipple discharge  Gastrointestinal: no complaints nausea, vomiting  Genitourinary: as noted in HPI    Neurological: no complaints of headache      Objective      /64 (BP Location: Right arm, Patient Position: Sitting, Cuff Size: Standard)   Wt 101 kg (222 lb 6 4 oz)   LMP 08/20/2018 (Exact Date)   Breastfeeding?  Yes   BMI 37 01 kg/m²     OBGyn Exam  General: alert and oriented, in no acute distress

## 2019-10-06 NOTE — PROGRESS NOTES
ASSESSMENT & PLAN: Fan Ibarra is a 29 y o  Aubree Izquierdo with normal gynecologic exam     1   Routine well woman exam done today  2    Pap and HPV:Pap with HPV was not done today  Current ASCCP Guidelines reviewed  Last Pap  2018 :  no abnormalities  3   The patient declined STD testing  Safe sex practices have been discussed  4  The patient is sexually active  She declined contraception and options have been discussed  5  The following were reviewed in today's visit: breast self exam, STD testing, use and side effects of OCPs, family planning choices, exercise and healthy diet  6  Patient to return to office in 12 months for annual      All questions have been answered to her satisfaction  CC:  Annual Gynecologic Examination    HPI: Fan Ibarra is a 29 y o  Aubree Izquierdo who presents for annual gynecologic examination  She has the following concerns:  none    Health Maintenance:    She exercises 2 days per week  She wears her seatbelt routinely  She does not perform regular monthly self breast exams  She feels safe at home  Patients does follow a regular diet  Past Medical History:   Diagnosis Date    Anxiety     Varicella     childhood       Past Surgical History:   Procedure Laterality Date    WISDOM TOOTH EXTRACTION         Past OB/Gyn History:   No LMP recorded  Menstrual History:  OB History        1    Para   1    Term   1            AB        Living   1       SAB        TAB        Ectopic        Multiple   0    Live Births   1              History of sexually transmitted infection No  Patient is currently sexually active  heterosexual Birth control: condoms  Last Pap  2018 :  no abnormalities      Family History   Problem Relation Age of Onset    Depression Mother     Crohn's disease Mother    Crandall Cancer Mother         breast    Mental illness Mother         anx    No Known Problems Father     No Known Problems Sister     Stroke Maternal Grandmother     Heart disease Maternal Grandfather         failure    Cancer Paternal Grandfather         prostate    Crohn's disease Sister        Social History:  Social History     Socioeconomic History    Marital status: Unknown     Spouse name: Rachel Paniagua    Number of children: Not on file    Years of education: Associate    Highest education level: Not on file   Occupational History    Occupation: RN   Social Needs    Financial resource strain: Not on file    Food insecurity:     Worry: Not on file     Inability: Not on file   F[employer] needs:     Medical: Not on file     Non-medical: Not on file   Tobacco Use    Smoking status: Former Smoker     Packs/day: 0 50     Years: 10 00     Pack years: 5 00     Types: Cigarettes     Last attempt to quit: 2011     Years since quittin 5    Smokeless tobacco: Never Used   Substance and Sexual Activity    Alcohol use: Not Currently     Comment: socially prior to confirmed pregnancy    Drug use: No    Sexual activity: Not Currently     Partners: Male     Birth control/protection: None   Lifestyle    Physical activity:     Days per week: Not on file     Minutes per session: Not on file    Stress: Not on file   Relationships    Social connections:     Talks on phone: Not on file     Gets together: Not on file     Attends [employer] service: Not on file     Active member of club or organization: Not on file     Attends meetings of clubs or organizations: Not on file     Relationship status: Not on file    Intimate partner violence:     Fear of current or ex partner: Not on file     Emotionally abused: Not on file     Physically abused: Not on file     Forced sexual activity: Not on file   Other Topics Concern    Not on file   Social History Narrative    Not on file     Presently lives with her family  Patient is     Patient is currently employed   No Known Allergies    Current Outpatient Medications:     docusate sodium (COLACE) 100 mg capsule, Take 1 capsule (100 mg total) by mouth 2 (two) times a day, Disp: 10 capsule, Rfl: 0    PRENATAL-DSS-CA-FE CBN-FA-DHA PO, Take 1 tablet by mouth daily, Disp: , Rfl:     witch hazel-glycerin (TUCKS) topical pad, Apply 1 pad topically as needed for irritation (Patient not taking: Reported on 7/8/2019), Disp: 1 each, Rfl: 0    Review of Systems:  Review of Systems   Constitutional: Negative  HENT: Negative  Respiratory: Negative  Cardiovascular: Negative  Gastrointestinal: Negative  Genitourinary: Negative  Neurological: Negative  Psychiatric/Behavioral: Negative  Physical Exam:  There were no vitals taken for this visit  Physical Exam   Constitutional: She is oriented to person, place, and time  She appears well-developed and well-nourished  No distress  Genitourinary: Vagina normal and uterus normal  There is no rash, tenderness, lesion or injury on the right labia  There is no rash, tenderness, lesion or injury on the left labia  Vagina exhibits rugosity  No tenderness or bleeding in the vagina  No vaginal discharge found  Right adnexum does not display mass, does not display tenderness and does not display fullness  Left adnexum does not display mass, does not display tenderness and does not display fullness  Cervix is parous  Cervix exhibits pinkness  Cervix does not exhibit motion tenderness, discharge or polyp  Uterus is mobile  Uterus is not enlarged, tender or exhibiting a mass  Genitourinary Comments: Non tender bladder   HENT:   Head: Normocephalic and atraumatic  Cardiovascular: Normal rate, regular rhythm and normal heart sounds  No murmur heard  Pulmonary/Chest: Effort normal and breath sounds normal  No stridor  No respiratory distress  She has no wheezes  Abdominal: Soft  She exhibits no distension  There is no tenderness  There is no guarding  Neurological: She is alert and oriented to person, place, and time  Skin: Skin is warm and dry  She is not diaphoretic  No erythema  No pallor  Nursing note and vitals reviewed

## 2019-10-08 ENCOUNTER — ANNUAL EXAM (OUTPATIENT)
Dept: OBGYN CLINIC | Facility: CLINIC | Age: 34
End: 2019-10-08
Payer: COMMERCIAL

## 2019-10-08 VITALS
WEIGHT: 218 LBS | BODY MASS INDEX: 35.03 KG/M2 | HEIGHT: 66 IN | SYSTOLIC BLOOD PRESSURE: 122 MMHG | DIASTOLIC BLOOD PRESSURE: 84 MMHG

## 2019-10-08 DIAGNOSIS — Z01.419 WOMEN'S ANNUAL ROUTINE GYNECOLOGICAL EXAMINATION: Primary | ICD-10-CM

## 2019-10-08 PROCEDURE — 99395 PREV VISIT EST AGE 18-39: CPT | Performed by: OBSTETRICS & GYNECOLOGY

## 2020-01-07 ENCOUNTER — OFFICE VISIT (OUTPATIENT)
Dept: FAMILY MEDICINE CLINIC | Facility: CLINIC | Age: 35
End: 2020-01-07
Payer: COMMERCIAL

## 2020-01-07 VITALS
WEIGHT: 214 LBS | OXYGEN SATURATION: 97 % | RESPIRATION RATE: 15 BRPM | HEIGHT: 66 IN | DIASTOLIC BLOOD PRESSURE: 78 MMHG | HEART RATE: 83 BPM | SYSTOLIC BLOOD PRESSURE: 124 MMHG | TEMPERATURE: 96.3 F | BODY MASS INDEX: 34.39 KG/M2

## 2020-01-07 DIAGNOSIS — M65.4 DE QUERVAIN'S DISEASE (TENOSYNOVITIS): Primary | ICD-10-CM

## 2020-01-07 DIAGNOSIS — Z76.89 ENCOUNTER TO ESTABLISH CARE: ICD-10-CM

## 2020-01-07 PROCEDURE — 3008F BODY MASS INDEX DOCD: CPT | Performed by: NURSE PRACTITIONER

## 2020-01-07 PROCEDURE — 99203 OFFICE O/P NEW LOW 30 MIN: CPT | Performed by: NURSE PRACTITIONER

## 2020-01-07 NOTE — PATIENT INSTRUCTIONS
Radha Kalie Disease   WHAT YOU NEED TO KNOW:   Gianna Muscat disease is inflammation of the tendons on the thumb side of your wrist  Tendons are thick strands of tissue that connect muscles to bones  DISCHARGE INSTRUCTIONS:   Splint or brace: These devices will help decrease pain, limit movement, and protect your wrist so that it can heal  Make sure your device is comfortable  If it is too tight, your fingers may feel numb or tingly  Do not push or lean on your device because it can break  Physical or occupational therapy:  You may need to see a physical or occupational therapist to teach you special exercises  These exercises help improve movement and decrease pain  They also help improve strength and decrease your risk for loss of function  Therapists will help you make changes to your daily activities to decrease stress and pressure on the tendons  Rest:  Rest your injured thumb or wrist  Avoid twisting, grasping, or gripping movements  Ask when you can return to your normal activities  Medicines:   · NSAIDs:  These medicines decrease swelling, pain, and fever  They are available without a doctor's order  Ask which medicine is right for you, and how much to take  Take as directed  NSAIDs can cause stomach bleeding or kidney problems if not taken correctly  · Take your medicine as directed  Contact your healthcare provider if you think your medicine is not helping or if you have side effects  Tell him of her if you are allergic to any medicine  Keep a list of the medicines, vitamins, and herbs you take  Include the amounts, and when and why you take them  Bring the list or the pill bottles to follow-up visits  Carry your medicine list with you in case of an emergency  Follow up with your healthcare provider as directed:  Write down your questions so you remember to ask them during your visits  Contact your healthcare provider if:   · Your splint or brace is too tight and you cannot loosen it      · You have a fever  · Your pain and swelling get worse or do not go away  · Your cannot grasp objects because of the pain and swelling  · You have questions or concerns about your condition or care  Return to the emergency department if:   · You cannot move your thumb or wrist     · Your fingers feel numb, tingly, cool to the touch, or look blue or pale  © 2017 [facility] [facility] Information is for End User's use only and may not be sold, redistributed or otherwise used for commercial purposes  All illustrations and images included in CareNotes® are the copyrighted property of [employer]  or Riki Mead  The above information is an  only  It is not intended as medical advice for individual conditions or treatments  Talk to your doctor, nurse or pharmacist before following any medical regimen to see if it is safe and effective for you

## 2020-01-07 NOTE — PROGRESS NOTES
FAMILY PRACTICE OFFICE VISIT       NAME: Joya Sánchez  AGE: 29 y o  SEX: female       : 1985        MRN: [id number]      Assessment and Plan     Problem List Items Addressed This Visit     None      Visit Diagnoses     De Quervain's disease (tenosynovitis)    -  Primary    Relevant Orders    Ambulatory referral to Orthopedic Surgery    Encounter to establish care                Patient Instructions   Rexanne Gottron Disease   WHAT YOU NEED TO KNOW:   Amina Dolphin disease is inflammation of the tendons on the thumb side of your wrist  Tendons are thick strands of tissue that connect muscles to bones  DISCHARGE INSTRUCTIONS:   Splint or brace: These devices will help decrease pain, limit movement, and protect your wrist so that it can heal  Make sure your device is comfortable  If it is too tight, your fingers may feel numb or tingly  Do not push or lean on your device because it can break  Physical or occupational therapy:  You may need to see a physical or occupational therapist to teach you special exercises  These exercises help improve movement and decrease pain  They also help improve strength and decrease your risk for loss of function  Therapists will help you make changes to your daily activities to decrease stress and pressure on the tendons  Rest:  Rest your injured thumb or wrist  Avoid twisting, grasping, or gripping movements  Ask when you can return to your normal activities  Medicines:   · NSAIDs:  These medicines decrease swelling, pain, and fever  They are available without a doctor's order  Ask which medicine is right for you, and how much to take  Take as directed  NSAIDs can cause stomach bleeding or kidney problems if not taken correctly  · Take your medicine as directed  Contact your healthcare provider if you think your medicine is not helping or if you have side effects  Tell him of her if you are allergic to any medicine   Keep a list of the medicines, vitamins, and herbs you take  Include the amounts, and when and why you take them  Bring the list or the pill bottles to follow-up visits  Carry your medicine list with you in case of an emergency  Follow up with your healthcare provider as directed:  Write down your questions so you remember to ask them during your visits  Contact your healthcare provider if:   · Your splint or brace is too tight and you cannot loosen it  · You have a fever  · Your pain and swelling get worse or do not go away  · Your cannot grasp objects because of the pain and swelling  · You have questions or concerns about your condition or care  Return to the emergency department if:   · You cannot move your thumb or wrist     · Your fingers feel numb, tingly, cool to the touch, or look blue or pale  © 2017 [employer]  Information is for End User's use only and may not be sold, redistributed or otherwise used for commercial purposes  All illustrations and images included in CareNotes® are the copyrighted property of A D A M , Inc  or Riki Mead  The above information is an  only  It is not intended as medical advice for individual conditions or treatments  Talk to your doctor, nurse or pharmacist before following any medical regimen to see if it is safe and effective for you  1  De Quervain's disease (tenosynovitis)  Left wrist pain over the past 7 months, consistent with DeQuervain's tenosynovitis  Recommend thumb spica splint, ice or heat as needed  Ibuprofen as needed  Currently breastfeeding, so will hold off on oral corticosteroids at this time  Given pain has been persistent over the past 7 months, recommend follow up with hand specialist, Dr Opal Gray  Patient is agreeable and will schedule  Ambulatory referral to Orthopedic Surgery   2  Encounter to establish care  This 29year old female presents today to establish care  Previous PCP in [address] [place] [address]   Is transitioning care due to she has moved from the [facility]  Past medical history includes anxiety, which currently is under good control  Past surgical history includes wisdom teeth extraction  BMI Counseling: Body mass index is 35 07 kg/m²  The BMI is above normal  Nutrition recommendations include moderation in carbohydrate intake and increasing intake of lean protein  Exercise recommendations include moderate aerobic physical activity for 150 minutes/week  Chief Complaint     Chief Complaint   Patient presents with    Establish Care       History of Present Illness     Janusz Sequeira is a 80-year-old female presenting today to establish care  She has recently moved from the [place] area  She was seeing a PCP connected with [facility]   Locally thus far she has only established with an obstetrician  She is a 9month-old baby, [place]  Currently breastfeeding  Currently working part time, 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 [employer]  Has had difficulty losing weight after pregnancy  Is trying to get back into an exercise routine  Has been having left wrist pain with certain movements over the past 7 months, since she had her baby  She is right hand dominant  Pain has not affected her grasp  No weakness  Pain is annoying, but not intense  As she has been trying to get back to exercising, she is not able to lift weights and do pushups, like she would like to, due to wrist pain  No numbness or tingling in hand  Pain is exacerbated by wrist extension, pressure on thumb  Has not taken anything OTC for this  Has a history of anxiety and panic attacks  This occurred during nursing school  She is not currently having problems with anxiety  Does not take anything for anxiety  Review of Systems   Review of Systems   Constitutional: Negative  HENT: Negative  Eyes: Negative  Respiratory: Negative  Cardiovascular: Negative  Gastrointestinal: Negative  Endocrine: Negative  Genitourinary: Negative  Musculoskeletal: Positive for arthralgias (wrist pain as noted in HPI)  Skin: Negative  Allergic/Immunologic: Negative  Neurological: Negative  Hematological: Negative  Psychiatric/Behavioral: Negative          Active Problem List     Patient Active Problem List   Diagnosis    Anxiety disorder       Past Medical History:  Past Medical History:   Diagnosis Date    Anxiety     Varicella     childhood       Past Surgical History:  Past Surgical History:   Procedure Laterality Date    WISDOM TOOTH EXTRACTION         Family History:  Family History   Problem Relation Age of Onset    Depression Mother     Crohn's disease Mother    [facility] Cancer Mother         breast    Mental illness Mother         anx    Breast cancer Mother     No Known Problems Father     No Known Problems Sister     Stroke Maternal Grandmother     Heart disease Maternal Grandfather         failure    Heart failure Maternal Grandfather     Cancer Paternal Grandfather         prostate    Crohn's disease Sister        Social History:  Social History     Socioeconomic History    Marital status: Unknown     Spouse name: Nithya Shetty    Number of children: Not on file    Years of education: Associate    Highest education level: Not on file   Occupational History    Occupation: RN   Social Needs    Financial resource strain: Not on file    Food insecurity:     Worry: Not on file     Inability: Not on file   [employer] needs:     Medical: Not on file     Non-medical: Not on file   Tobacco Use    Smoking status: Former Smoker     Packs/day: 0 50     Years: 10 00     Pack years: 5 00     Types: Cigarettes     Last attempt to quit: 2011     Years since quittin 7    Smokeless tobacco: Never Used   Substance and Sexual Activity    Alcohol use: Yes     Comment: socially     Drug use: Never    Sexual activity: Yes     Partners: Male     Birth control/protection: None   Lifestyle    Physical activity:     Days per week: Not on file     Minutes per session: Not on file    Stress: Not on file   Relationships    Social connections:     Talks on phone: Not on file     Gets together: Not on file     Attends Spiritism service: Not on file     Active member of club or organization: Not on file     Attends meetings of clubs or organizations: Not on file     Relationship status: Not on file    Intimate partner violence:     Fear of current or ex partner: Not on file     Emotionally abused: Not on file     Physically abused: Not on file     Forced sexual activity: Not on file   Other Topics Concern    Not on file   Social History Narrative    Not on file       I have reviewed the patient's medical history in detail; there are no changes to the history as noted in the electronic medical record  Objective     Vitals:    01/07/20 1306   BP: 124/78   BP Location: Right arm   Patient Position: Sitting   Cuff Size: Standard   Pulse: 83   Resp: 15   Temp: (!) 96 3 °F (35 7 °C)   TempSrc: Tympanic   SpO2: 97%   Weight: 97 1 kg (214 lb)   Height: 5' 5 5" (1 664 m)     Wt Readings from Last 3 Encounters:   01/07/20 97 1 kg (214 lb)   10/08/19 98 9 kg (218 lb)   07/08/19 101 kg (222 lb 6 4 oz)     Physical Exam   Constitutional: She is oriented to person, place, and time  She appears well-developed and well-nourished  No distress  HENT:   Head: Normocephalic and atraumatic  Right Ear: Tympanic membrane and ear canal normal    Left Ear: Tympanic membrane and ear canal normal    Nose: Nose normal    Mouth/Throat: Uvula is midline and oropharynx is clear and moist    Eyes: Pupils are equal, round, and reactive to light  Conjunctivae are normal    Neck: Normal range of motion  Neck supple  No thyromegaly present  Cardiovascular: Normal rate, regular rhythm and normal heart sounds  No murmur heard    Pulmonary/Chest: Effort normal and breath sounds normal    Musculoskeletal: She exhibits no edema  Left wrist appears normal   Left wrist + Finkelstein test  Left wrist tenderness over radial styloid process   Bilateral hand grasps 5/5  Radial pulses +2/3 bilaterally     Lymphadenopathy:     She has no cervical adenopathy  Neurological: She is alert and oriented to person, place, and time  Skin: Skin is warm and dry  Capillary refill takes less than 2 seconds  No rash noted  Psychiatric: She has a normal mood and affect  Nursing note and vitals reviewed  ALLERGIES:  No Known Allergies    Current Medications     Current Outpatient Medications   Medication Sig Dispense Refill    Multiple Vitamins-Minerals (MULTI-VITAMIN GUMMIES PO) Take by mouth      PRENATAL-DSS-CA-FE CBN-FA-DHA PO Take 1 tablet by mouth daily       No current facility-administered medications for this visit            Health Maintenance     Health Maintenance   Topic Date Due    BMI: Followup Plan  02/08/2003    Depression Screening PHQ  01/07/2021    BMI: Adult  01/07/2021    Cervical Cancer Screening  11/06/2023    DTaP,Tdap,and Td Vaccines (3 - Td) 03/13/2029    Pneumococcal Vaccine: 65+ Years (1 of 2 - PCV13) 02/08/2050    HIV Screening  Completed    Influenza Vaccine  Completed    Pneumococcal Vaccine: Pediatrics (0 to 5 Years) and At-Risk Patients (6 to 59 Years)  Aged Out    HIB Vaccine  Aged Out    Hepatitis B Vaccine  Aged Out    IPV Vaccine  Aged Out    Hepatitis A Vaccine  Aged Out    Meningococcal ACWY Vaccine  Aged Out    HPV Vaccine  Aged [employer] History   Administered Date(s) Administered     Influenza (IM) Preservative Free 09/15/2016    Hep B, adult 06/06/2016, 07/11/2016, 12/07/2016    INFLUENZA 09/15/2017, 10/12/2018    Influenza, injectable, quadrivalent, preservative free 0 5 mL 10/12/2018    Influenza, seasonal, injectable 10/07/2019    MMR 06/06/2016    Tdap 06/06/2016, 03/13/2019       REED Krishnan

## 2020-02-03 ENCOUNTER — APPOINTMENT (OUTPATIENT)
Dept: RADIOLOGY | Facility: AMBULARY SURGERY CENTER | Age: 35
End: 2020-02-03
Attending: SURGERY
Payer: COMMERCIAL

## 2020-02-03 ENCOUNTER — CONSULT (OUTPATIENT)
Dept: OBGYN CLINIC | Facility: CLINIC | Age: 35
End: 2020-02-03
Payer: COMMERCIAL

## 2020-02-03 VITALS
HEART RATE: 73 BPM | DIASTOLIC BLOOD PRESSURE: 74 MMHG | HEIGHT: 66 IN | SYSTOLIC BLOOD PRESSURE: 110 MMHG | BODY MASS INDEX: 34.39 KG/M2 | WEIGHT: 214 LBS

## 2020-02-03 DIAGNOSIS — M65.4 DE QUERVAIN'S DISEASE (TENOSYNOVITIS): Primary | ICD-10-CM

## 2020-02-03 DIAGNOSIS — M65.4 DE QUERVAIN'S DISEASE (TENOSYNOVITIS): ICD-10-CM

## 2020-02-03 PROCEDURE — 20550 NJX 1 TENDON SHEATH/LIGAMENT: CPT | Performed by: SURGERY

## 2020-02-03 PROCEDURE — 99243 OFF/OP CNSLTJ NEW/EST LOW 30: CPT | Performed by: SURGERY

## 2020-02-03 PROCEDURE — 73110 X-RAY EXAM OF WRIST: CPT

## 2020-02-03 RX ORDER — DEXAMETHASONE SODIUM PHOSPHATE 100 MG/10ML
40 INJECTION INTRAMUSCULAR; INTRAVENOUS
Status: COMPLETED | OUTPATIENT
Start: 2020-02-03 | End: 2020-02-03

## 2020-02-03 RX ORDER — LIDOCAINE HYDROCHLORIDE 10 MG/ML
1 INJECTION, SOLUTION INFILTRATION; PERINEURAL
Status: COMPLETED | OUTPATIENT
Start: 2020-02-03 | End: 2020-02-03

## 2020-02-03 RX ADMIN — DEXAMETHASONE SODIUM PHOSPHATE 40 MG: 100 INJECTION INTRAMUSCULAR; INTRAVENOUS at 11:50

## 2020-02-03 RX ADMIN — LIDOCAINE HYDROCHLORIDE 1 ML: 10 INJECTION, SOLUTION INFILTRATION; PERINEURAL at 11:50

## 2020-02-03 NOTE — PROGRESS NOTES
Lan UNDERWOOD  Attending, Orthopaedic Surgery  Hand, Wrist, and Elbow Surgery  [facility]      ORTHOPAEDIC HAND, WRIST, AND ELBOW OFFICE  VISIT       ASSESSMENT/PLAN:      29 y o  female with left De Quervain's tenosynovitis  Treatment options were discussed with Joya today  She was fit with a left sided thumb spica brace that she will wear at night  OT was ordered for a HEP for Canelo Fitch  A left De Quervain's CSI was performed in the office today, without complication  This can be repeated in 6 weeks time if the 1st CSI was beneficial  Follow up in 6 weeks time  The patient verbalized understanding of exam findings and treatment plan  We engaged in the shared decision-making process and treatment options were discussed at length with the patient  Surgical and conservative management discussed today along with risks and benefits  Diagnoses and all orders for this visit:    Canelo Fitch disease (tenosynovitis)  -     Ambulatory referral to Orthopedic Surgery  -     XR wrist 3+ vw left; Future  -     Hand/upper extremity injection: L extensor compartment 1  -     Ambulatory referral to PT/OT hand therapy; Future      Follow Up:  Return in about 6 weeks (around 3/16/2020)  To Do Next Visit:  Re-evaluation of current issue    General Discussions:  Harinder Liao Tenosynovitis: The anatomy and physiology of de Quervain's tenosynovitis was discussed with the patient today in the office  Edema and increased contact pressure within the first dorsal extensor compartment at the radial styloid can cause pain, crepitation, and limitation of function  Treatment options include resting thumb spica splints to decrease edema, oral anti-inflammatory medications, home or formal therapy exercises, up to 2 steroid injections within the first dorsal extensor compartment, or surgical release    While majority of patients do respond to conservative treatment, up to 20% may require surgical release        ____________________________________________________________________________________________________________________________________________      CHIEF COMPLAINT:  Chief Complaint   Patient presents with    Left Wrist - Pain       SUBJECTIVE:  Berlin Jeffries is a 29y o  year old RHD female who presents to the office for left wrist pain  Gabby Sanford was referred to the office by her PCP, Madison Hsululy notes pain to the radial aspect of her left wrist for aprox  7-8 months, since having a baby  She notes intermittent pain to the radial aspect of her left wrist  She notes increased pain with wrist extension or with a subjective Finkelstein test  She has been wearing a thumb spica brace at night that she bought OTC  She notes mild improvement with regards to bracing        Pain/symptom timing:  Worse during the day when active  Pain/symptom context:  Worse with activites and work  Pain/symptom modifying factors:  Rest makes better, activities make worse  Pain/symptom associated signs/symptoms: none    Prior treatment   · NSAIDsYes   · Injections No   · Bracing/Orthotics Yes    Physical Therapy No     I have personally reviewed all the relevant PMH, PSH, SH, FH, Medications and allergies      PAST MEDICAL HISTORY:  Past Medical History:   Diagnosis Date    Anxiety     Varicella     childhood       PAST SURGICAL HISTORY:  Past Surgical History:   Procedure Laterality Date    WISDOM TOOTH EXTRACTION         FAMILY HISTORY:  Family History   Problem Relation Age of Onset    Depression Mother     Crohn's disease Mother     Cancer Mother         breast    Mental illness Mother         anx    Breast cancer Mother     No Known Problems Father     No Known Problems Sister     Stroke Maternal Grandmother     Heart disease Maternal Grandfather         failure    Heart failure Maternal Grandfather     Cancer Paternal Grandfather         prostate    Crohn's disease Sister        SOCIAL HISTORY:  Social History     Tobacco Use    Smoking status: Former Smoker     Packs/day: 0 50     Years: 10 00     Pack years: 5 00     Types: Cigarettes     Last attempt to quit: 2011     Years since quittin 8    Smokeless tobacco: Never Used   Substance Use Topics    Alcohol use: Yes     Comment: socially     Drug use: Never       MEDICATIONS:    Current Outpatient Medications:     Multiple Vitamins-Minerals (MULTI-VITAMIN GUMMIES PO), Take by mouth, Disp: , Rfl:     PRENATAL-DSS-CA-FE CBN-FA-DHA PO, Take 1 tablet by mouth daily, Disp: , Rfl:     ALLERGIES:  No Known Allergies        REVIEW OF SYSTEMS:  Review of Systems   Constitutional: Negative for chills, fever and unexpected weight change  HENT: Negative for hearing loss, nosebleeds and sore throat  Eyes: Negative for pain, redness and visual disturbance  Respiratory: Negative for cough, shortness of breath and wheezing  Cardiovascular: Negative for chest pain, palpitations and leg swelling  Gastrointestinal: Negative for abdominal pain, nausea and vomiting  Endocrine: Negative for polydipsia and polyuria  Genitourinary: Negative for difficulty urinating and hematuria  Musculoskeletal: Negative for arthralgias, joint swelling and myalgias  Skin: Negative for rash and wound  Neurological: Negative for dizziness, numbness and headaches  Psychiatric/Behavioral: Negative for decreased concentration, dysphoric mood and suicidal ideas  The patient is not nervous/anxious          VITALS:  Vitals:    20 1133   BP: 110/74   Pulse: 73       LABS:  HgA1c: No results found for: HGBA1C  BMP: No results found for: GLUCOSE, CALCIUM, NA, K, CO2, CL, BUN, CREATININE    _____________________________________________________  PHYSICAL EXAMINATION:  General: well developed and well nourished, alert, oriented times 3 and appears comfortable  Psychiatric: Normal  HEENT: Normocephalic, Atraumatic Trachea Midline, No torticollis  Pulmonary: No audible wheezing or respiratory distress   Cardiovascular: No pitting edema, 2+ radial pulse   Skin: No masses, erythema, lacerations, fluctation, ulcerations  Neurovascular: Sensation Intact to the Median, Ulnar, Radial Nerve, Motor Intact to the Median, Ulnar, Radial Nerve and Pulses Intact  Musculoskeletal: Normal, except as noted in detailed exam and in HPI        MUSCULOSKELETAL EXAMINATION:    Shelli [place] Tenosynovitis Exam:  left side    Positive tender to palpation over 1st dorsal extensor compartment   Positive palpable nodule  Negative crepitus over 1st dorsal extensor compartment   Positive Finkelstein's test    Positive pain with resisted abduction of the thumb    ___________________________________________________  STUDIES REVIEWED:  I have personally reviewed AP lateral and oblique radiographs of left wrist which demonstrates no acute fracture dislocation no calcification surrounding the 1st dorsal compartment          PROCEDURES PERFORMED:  Hand/upper extremity injection: L extensor compartment 1  Date/Time: 2/3/2020 11:50 AM  Consent given by: patient  Site marked: site marked  Timeout: Immediately prior to procedure a time out was called to verify the correct patient, procedure, equipment, support staff and site/side marked as required   Supporting Documentation  Indications: pain   Procedure Details  Condition:de Quervain's tenosynovitis Site: L extensor compartment 1   Preparation: Patient was prepped and draped in the usual sterile fashion  Needle size: 25 G  Ultrasound guidance: no  Medications administered: 1 mL lidocaine 1 %; 40 mg dexamethasone 100 mg/10 mL    Patient tolerance: patient tolerated the procedure well with no immediate complications  Dressing:  Sterile dressing applied            _____________________________________________________      Scribe Attestation    I,:   Abbey Vilchis am acting as a scribe while in the presence of the attending physician :        I,:   Meseret Novoa, MD personally performed the services described in this documentation    as scribed in my presence :

## 2020-02-10 ENCOUNTER — EVALUATION (OUTPATIENT)
Dept: OCCUPATIONAL THERAPY | Facility: CLINIC | Age: 35
End: 2020-02-10
Payer: COMMERCIAL

## 2020-02-10 DIAGNOSIS — M65.4 DE QUERVAIN'S DISEASE (TENOSYNOVITIS): ICD-10-CM

## 2020-02-10 PROCEDURE — 97110 THERAPEUTIC EXERCISES: CPT | Performed by: OCCUPATIONAL THERAPIST

## 2020-02-10 PROCEDURE — 97165 OT EVAL LOW COMPLEX 30 MIN: CPT | Performed by: OCCUPATIONAL THERAPIST

## 2020-02-10 PROCEDURE — 97140 MANUAL THERAPY 1/> REGIONS: CPT | Performed by: OCCUPATIONAL THERAPIST

## 2020-02-10 NOTE — PROGRESS NOTES
OT Evaluation     Today's date: 2/10/2020  Patient name: Gretel Dodd  : 1985  MRN: [id number]  Referring provider: Dewey Freeman MD  Dx:   Encounter Diagnosis     ICD-10-CM    1  Lindsay Coyne disease (tenosynovitis) M65 4 Ambulatory referral to PT/OT hand therapy                  Assessment  Assessment details: [facility] ESTEBAN presents with L wrist pain that started a month after the birth of her son  She was dx with Lindsay Coyne   She received an injection by Dr Fraser Dies  She wears a TSS at night   She has pain with wrist ROM   She has local tenderness   She has weak  and pinch   She is limited with use of L for childcare , self care and meal prep   She works as a nurse  She has pain with gym exercises  Impairments: impaired physical strength, lacks appropriate home exercise program and pain with function  Functional limitations: pain with childcare , self care , meal prep   Plan  Patient would benefit from: OT eval and skilled occupational therapy  Planned modality interventions: thermotherapy: hydrocollator packs and cryotherapy  Planned therapy interventions: activity modification, ADL retraining, joint mobilization, massage, manual therapy, strengthening, home exercise program, functional ROM exercises, therapeutic activities, stretching and therapeutic exercise  Frequency: 2x week  Duration in weeks: 6  Plan of Care beginning date: 2/10/2020  Treatment plan discussed with: patient        Subjective Evaluation    History of Present Illness  Date of onset: 2019  Mechanism of injury: [facility] ESTEBAN presents  With L wrist pain that started a month after having her son  She is wearing a splint at night    She was injected  Quality of life: excellent    Pain  Current pain ratin  At best pain ratin  At worst pain ratin  Location: L radial wrist  Quality: dull ache and sharp  Relieving factors: rest and support    Social Support  Steps to enter house: yes  Stairs in house: yes   Lives in: multiple-level home  Lives with: spouse and young children    Employment status: working  Hand dominance: right    Treatments  Previous treatment: injection treatment and immobilization  Patient Goals  Patient goals for therapy: decreased pain, increased strength, independence with ADLs/IADLs and return to sport/leisure activities  Patient goal: no pain in L wrist        Objective     Tenderness     Additional Tenderness Details  Tender 1st DI     Active Range of Motion     Left Wrist   Wrist flexion: 70 degrees   Wrist extension: 80 degrees   Radial deviation: 18 degrees   Ulnar deviation: 50 degrees     Left Thumb   Opposition: Digit ROM WNL     Additional Active Range of Motion Details  Digit ROM WNL    Strength/Myotome Testing     Left Wrist/Hand   Normal wrist strength     (2nd hand position)     Trial 1: 58    Thumb Strength  Key/Lateral Pinch     Henderson 1: 12  Palmar/Three-Point Pinch     Trial 1: 7    Right Wrist/Hand      (2nd hand position)     Trial 1: 80    Thumb Strength   Key/Lateral Pinch     Trial 1: 18  Palmar/Three-Point Pinch     Trial 1: 13    Tests     Left Wrist/Hand   Positive Finkelstein's       Swelling     Left Wrist/Hand   Circumference wrist: 16 cm    Right Wrist/Hand   Circumference wrist: 16 4 cm             Precautions: universal      Manual  2/10            graston wrist 4m            MOB wrist 4m            MWM  2m            MFR forearm 2m            Ktape L                 Exercise Diary  2/10            AROM wrist 2x10            PROM wrist 2x10            Thumb ROM 2x10                                                                                                                                                                                                                            HEP  5x day                 Modalities  2/10            MH 10m

## 2020-02-18 ENCOUNTER — OFFICE VISIT (OUTPATIENT)
Dept: OCCUPATIONAL THERAPY | Facility: CLINIC | Age: 35
End: 2020-02-18
Payer: COMMERCIAL

## 2020-02-18 DIAGNOSIS — M65.4 DE QUERVAIN'S DISEASE (TENOSYNOVITIS): Primary | ICD-10-CM

## 2020-02-18 PROCEDURE — 97140 MANUAL THERAPY 1/> REGIONS: CPT

## 2020-02-18 PROCEDURE — 97110 THERAPEUTIC EXERCISES: CPT

## 2020-02-18 NOTE — PROGRESS NOTES
Daily Note     Today's date: 2020  Patient name: Rigoberto Barger  : 1985  MRN: [id number]  Referring provider: Rachel Rhodes MD  Dx:   Encounter Diagnosis     ICD-10-CM    1  [place] Bernheim disease (tenosynovitis) M65 4                   Subjective: "It felt better with the tape on " NPR = 3/10      Objective: See treatment diary below      Assessment: Tolerated treatment well  Patient would benefit from continued OT  Pt reported no pain with isometrics and is able to oppose to [facility] without pain  Discomfort reported at end range of thumb extension and radial abduction  Plan: Continue per plan of care        Precautions: universal      Manual  2/10 2/18           graston wrist 4m 5m           MOB wrist 4m 5m           MWM  2m 2m           MFR forearm 2m 3m           Ktape L  applied               Exercise Diary  2/10 2/18           AROM wrist 2x10 2x10           PROM wrist 2x10 15x           Thumb ROM 2x10 2x10           Thumb isometrics  5x           Wrist isometrics  5x                                                                                                                                                                                                 HEP  5x day                 Modalities  2/10 2/18           MH 10m 10m

## 2020-02-20 ENCOUNTER — OFFICE VISIT (OUTPATIENT)
Dept: OCCUPATIONAL THERAPY | Facility: CLINIC | Age: 35
End: 2020-02-20
Payer: COMMERCIAL

## 2020-02-20 DIAGNOSIS — M65.4 DE QUERVAIN'S DISEASE (TENOSYNOVITIS): Primary | ICD-10-CM

## 2020-02-20 PROCEDURE — 97140 MANUAL THERAPY 1/> REGIONS: CPT

## 2020-02-20 PROCEDURE — 97110 THERAPEUTIC EXERCISES: CPT

## 2020-02-20 NOTE — PROGRESS NOTES
Daily Note     Today's date: 2020  Patient name: Heather Alarcon  : 1985  MRN: [id number]  Referring provider: Gaby Walton MD  Dx:   Encounter Diagnosis     ICD-10-CM    1  Corean Bump disease (tenosynovitis) M65 4                   Subjective: "Yesterday it was really bad"      Objective: See treatment diary below      Assessment: Tolerated treatment well  Patient would benefit from continued OT  Pain at end range motion, this is the only pain she reports    Plan: Continue per plan of care        Precautions: universal      Manual  2/10 2/18 2/20          graston wrist 4m 5m 4m          MOB wrist 4m 5m 4m          MWM  2m 2m           MFR forearm 2m 3m 3m          Ktape L  applied               Exercise Diary  2/10 2/18 2/20          AROM wrist 2x10 2x10 2x10          PROM wrist 2x10 15x 15x          Thumb ROM 2x10 2x10 2x10          Thumb isometrics  5x           Wrist isometrics  5x 2x10 YFB                                                                                                                                                                                                 HEP  5x day                 Modalities  2/10 2/18 2/20          MH 10m 10m 10m

## 2020-02-24 ENCOUNTER — APPOINTMENT (OUTPATIENT)
Dept: OCCUPATIONAL THERAPY | Facility: CLINIC | Age: 35
End: 2020-02-24
Payer: COMMERCIAL

## 2020-02-25 ENCOUNTER — OFFICE VISIT (OUTPATIENT)
Dept: OCCUPATIONAL THERAPY | Facility: CLINIC | Age: 35
End: 2020-02-25
Payer: COMMERCIAL

## 2020-02-25 DIAGNOSIS — M65.4 DE QUERVAIN'S DISEASE (TENOSYNOVITIS): Primary | ICD-10-CM

## 2020-02-25 PROCEDURE — 97530 THERAPEUTIC ACTIVITIES: CPT | Performed by: OCCUPATIONAL THERAPIST

## 2020-02-25 PROCEDURE — 97110 THERAPEUTIC EXERCISES: CPT | Performed by: OCCUPATIONAL THERAPIST

## 2020-02-25 PROCEDURE — 97035 APP MDLTY 1+ULTRASOUND EA 15: CPT | Performed by: OCCUPATIONAL THERAPIST

## 2020-02-25 PROCEDURE — 97140 MANUAL THERAPY 1/> REGIONS: CPT | Performed by: OCCUPATIONAL THERAPIST

## 2020-02-25 NOTE — PROGRESS NOTES
Daily Note     Today's date: 2020  Patient name: Corey Doyle  : 1985  MRN: [id number]  Referring provider: Chai Mayen MD  Dx:   Encounter Diagnosis     ICD-10-CM    1  Lindsay Sports disease (tenosynovitis) M65 4                   Subjective: I am doing better      Objective: See treatment diary below      Assessment: Tolerated treatment well  Patient has improved pain   She remains tender  Plan: Continue per plan of care  Precautions: universal      Manual  2/10 2/18 2/20 2/25         graston wrist 4m 5m 4m 4m         MOB wrist 4m 5m 4m 4m         MWM  2m 2m  2m         MFR forearm 2m 3m 3m 2m         Ktape L  applied               Exercise Diary  2/10 2/18 2/20 2/25         AROM wrist 2x10 2x10 2x10 2x10         PROM wrist 2x10 15x 15x 15x         Thumb ROM 2x10 2x10 2x10 2x10         Thumb isometrics  5x  5x         Wrist isometrics  5x 2x10 YFB           Pegs neutral     30x         Wrist ecc    2#  3x10                                                                                                                                                                     HEP  5x day                 Modalities  2/10 2/18 2/20 2/25         MH 10m 10m 10m          US      8  8m         CP post     6m

## 2020-02-27 ENCOUNTER — OFFICE VISIT (OUTPATIENT)
Dept: OCCUPATIONAL THERAPY | Facility: CLINIC | Age: 35
End: 2020-02-27
Payer: COMMERCIAL

## 2020-02-27 DIAGNOSIS — M65.4 DE QUERVAIN'S DISEASE (TENOSYNOVITIS): Primary | ICD-10-CM

## 2020-02-27 PROCEDURE — 97140 MANUAL THERAPY 1/> REGIONS: CPT | Performed by: OCCUPATIONAL THERAPIST

## 2020-02-27 PROCEDURE — 97035 APP MDLTY 1+ULTRASOUND EA 15: CPT | Performed by: OCCUPATIONAL THERAPIST

## 2020-02-27 PROCEDURE — 97110 THERAPEUTIC EXERCISES: CPT | Performed by: OCCUPATIONAL THERAPIST

## 2020-02-27 NOTE — PROGRESS NOTES
Daily Note     Today's date: 2020  Patient name: Jae Lai  : 1985  MRN: [id number]  Referring provider: Leonor Arizmendi MD  Dx:   Encounter Diagnosis     ICD-10-CM    1  [place] Gabriella disease (tenosynovitis) M65 4                   Subjective: I am doing better      Objective: See treatment diary below      Assessment: Tolerated treatment well  Patient has improved  pain   She has full ROM   Plan: Continue per plan of care  Precautions: universal      Manual  2/10 2/18 2/20 2/25 2/27        graston wrist 4m 5m 4m 4m 4m        MOB wrist 4m 5m 4m 4m 4m        MWM  2m 2m  2m 2m        MFR forearm 2m 3m 3m 2m 2m        Ktape L  applied               Exercise Diary  2/10 2/18 2/20 2/25 2/27        AROM wrist 2x10 2x10 2x10 2x10 2x10        PROM wrist 2x10 15x 15x 15x 15x        Thumb ROM 2x10 2x10 2x10 2x10 2x10        Thumb isometrics  5x  5x 5x        Wrist isometrics  5x 2x10 YFB           Pegs neutral     30x 30x        Wrist ecc    2#  3x10 2#  3x10                                                                                                                                                                    HEP  5x day                 Modalities  2/10 2/18 2/20 2/25 2/27        MH 10m 10m 10m          US      8  8m  8  8m        CP post     6m 6m

## 2020-03-02 ENCOUNTER — APPOINTMENT (OUTPATIENT)
Dept: OCCUPATIONAL THERAPY | Facility: CLINIC | Age: 35
End: 2020-03-02
Payer: COMMERCIAL

## 2020-03-03 ENCOUNTER — OFFICE VISIT (OUTPATIENT)
Dept: OCCUPATIONAL THERAPY | Facility: CLINIC | Age: 35
End: 2020-03-03
Payer: COMMERCIAL

## 2020-03-03 DIAGNOSIS — M65.4 DE QUERVAIN'S DISEASE (TENOSYNOVITIS): Primary | ICD-10-CM

## 2020-03-03 PROCEDURE — 97140 MANUAL THERAPY 1/> REGIONS: CPT | Performed by: OCCUPATIONAL THERAPIST

## 2020-03-03 PROCEDURE — 97035 APP MDLTY 1+ULTRASOUND EA 15: CPT | Performed by: OCCUPATIONAL THERAPIST

## 2020-03-03 PROCEDURE — 97110 THERAPEUTIC EXERCISES: CPT | Performed by: OCCUPATIONAL THERAPIST

## 2020-03-03 NOTE — PROGRESS NOTES
Daily Note     Today's date: 3/3/2020  Patient name: Jae Lai  : 1985  MRN: [id number]  Referring provider: Leonor Arizmendi MD  Dx:   Encounter Diagnosis     ICD-10-CM    1  [place] Gabriella disease (tenosynovitis) M65 4                   Subjective: I am doing better   It's tender      Objective: See treatment diary below      Assessment: Tolerated treatment well  Patient has local tenderness but improved pain with function  Plan: Continue per plan of care  Precautions: universal      Manual  2/10 2/18 2/20 2/25 2/27 3/3       graston wrist 4m 5m 4m 4m 4m 4m       MOB wrist 4m 5m 4m 4m 4m 4m       MWM  2m 2m  2m 2m 2m       MFR forearm 2m 3m 3m 2m 2m 2m       Ktape L  applied               Exercise Diary  2/10 2/18 2/20 2/25 2/27 3/3       AROM wrist 2x10 2x10 2x10 2x10 2x10 2x10       PROM wrist 2x10 15x 15x 15x 15x 15x       Thumb ROM 2x10 2x10 2x10 2x10 2x10 2x10       Thumb isometrics  5x  5x 5x        Wrist isometrics  5x 2x10 YFB           Pegs neutral     30x 30x 30x       Wrist ecc    2#  3x10 2#  3x10 2#  3x10                                                                                                                                                                   HEP  5x day                 Modalities  2/10 2/18 2/20 2/25 2/27 3/3       MH 10m 10m 10m          US      8  8m  8  8m  8  8m       CP post     6m 6m 6m

## 2020-03-05 ENCOUNTER — OFFICE VISIT (OUTPATIENT)
Dept: OCCUPATIONAL THERAPY | Facility: CLINIC | Age: 35
End: 2020-03-05
Payer: COMMERCIAL

## 2020-03-05 DIAGNOSIS — M65.4 DE QUERVAIN'S DISEASE (TENOSYNOVITIS): Primary | ICD-10-CM

## 2020-03-05 PROCEDURE — 97035 APP MDLTY 1+ULTRASOUND EA 15: CPT | Performed by: OCCUPATIONAL THERAPIST

## 2020-03-05 PROCEDURE — 97110 THERAPEUTIC EXERCISES: CPT | Performed by: OCCUPATIONAL THERAPIST

## 2020-03-05 PROCEDURE — 97140 MANUAL THERAPY 1/> REGIONS: CPT | Performed by: OCCUPATIONAL THERAPIST

## 2020-03-05 NOTE — PROGRESS NOTES
Daily Note     Today's date: 3/5/2020  Patient name: Gretel Dodd  : 1985  MRN: [id number]  Referring provider: Dewey Freeman MD  Dx:   Encounter Diagnosis     ICD-10-CM    1  [place]say See disease (tenosynovitis) M65 4                   Subjective: I am doing better      Objective: See treatment diary below      Assessment: Tolerated treatment well  Patient has improved pain  Plan: Continue per plan of care  Precautions: universal      Manual  2/10 2/18 2/20 2/25 2/27 3/3 3/5      graston wrist 4m 5m 4m 4m 4m 4m 4m      MOB wrist 4m 5m 4m 4m 4m 4m 4m       MWM  2m 2m  2m 2m 2m 2m      MFR forearm 2m 3m 3m 2m 2m 2m 2m      Ktape L  applied     L          Exercise Diary  2/10 2/18 2/20 2/25 2/27 3/3 3      AROM wrist 2x10 2x10 2x10 2x10 2x10 2x10 2x10      PROM wrist 2x10 15x 15x 15x 15x 15x 15x      Thumb ROM 2x10 2x10 2x10 2x10 2x10 2x10 2x10      Thumb isometrics  5x  5x 5x        Wrist isometrics  5x 2x10 YFB           Pegs neutral     30x 30x 30x 30x      Wrist ecc    2#  3x10 2#  3x10 2#  3x10 2#  3x10                                                                                                                                                                  HEP  5x day                 Modalities  2/10 2/18 2/20 2/25 2/27 3/3 3      MH 10m 10m 10m          US      8  8m  8  8m  8  8m   8w/c  8m      CP post     6m 6m 6m 6m

## 2020-03-09 ENCOUNTER — APPOINTMENT (OUTPATIENT)
Dept: OCCUPATIONAL THERAPY | Facility: CLINIC | Age: 35
End: 2020-03-09
Payer: COMMERCIAL

## 2020-03-10 ENCOUNTER — OFFICE VISIT (OUTPATIENT)
Dept: OCCUPATIONAL THERAPY | Facility: CLINIC | Age: 35
End: 2020-03-10
Payer: COMMERCIAL

## 2020-03-10 DIAGNOSIS — M65.4 DE QUERVAIN'S DISEASE (TENOSYNOVITIS): Primary | ICD-10-CM

## 2020-03-10 PROCEDURE — 97140 MANUAL THERAPY 1/> REGIONS: CPT | Performed by: OCCUPATIONAL THERAPIST

## 2020-03-10 PROCEDURE — 97530 THERAPEUTIC ACTIVITIES: CPT | Performed by: OCCUPATIONAL THERAPIST

## 2020-03-10 PROCEDURE — 97110 THERAPEUTIC EXERCISES: CPT | Performed by: OCCUPATIONAL THERAPIST

## 2020-03-10 NOTE — PROGRESS NOTES
Daily Note     Today's date: 3/10/2020  Patient name: Rigoberto Barger  : 1985  MRN: [id number]  Referring provider: Rachel Rhodes MD  Dx:   Encounter Diagnosis     ICD-10-CM    1  [place] Bernheim disease (tenosynovitis) M65 4                   Subjective: I am pregnant   I am feeling better      Objective: See treatment diary below      Assessment: Tolerated treatment well  Patient has improved pain levels   She has improved function with good carryover for behavior mod  Plan: Continue per plan of care  Precautions: universal      Manual  2/10 2/18 2/20 2/25 2/27 3/3 3/5 3/10     graston wrist 4m 5m 4m 4m 4m 4m 4m 4m     MOB wrist 4m 5m 4m 4m 4m 4m 4m  4m     MWM  2m 2m  2m 2m 2m 2m 2m     MFR forearm 2m 3m 3m 2m 2m 2m 2m 2m     Ktape L  applied     L          Exercise Diary  2/10 2/18 2/20 2/25 2/27 3/3 3/5 3/10     AROM wrist 2x10 2x10 2x10 2x10 2x10 2x10 2x10 2x10     PROM wrist 2x10 15x 15x 15x 15x 15x 15x 15x     Thumb ROM 2x10 2x10 2x10 2x10 2x10 2x10 2x10 2x10     Thumb isometrics  5x  5x 5x        Wrist isometrics  5x 2x10 YFB           Pegs neutral     30x 30x 30x 30x 30x     Wrist ecc    2#  3x10 2#  3x10 2#  3x10 2#  3x10 2#  3x10                                                                                                                                                                 HEP  5x day                 Modalities  2/10 2/18 2/20 2/25 2/27 3/3 3/5 3/10     MH 10m 10m 10m     10m     US      8  8m  8  8m  8  8m   8w/c  8m      CP post     6m 6m 6m 6m   6m

## 2020-03-12 ENCOUNTER — OFFICE VISIT (OUTPATIENT)
Dept: OCCUPATIONAL THERAPY | Facility: CLINIC | Age: 35
End: 2020-03-12
Payer: COMMERCIAL

## 2020-03-12 DIAGNOSIS — M65.4 DE QUERVAIN'S DISEASE (TENOSYNOVITIS): Primary | ICD-10-CM

## 2020-03-12 PROCEDURE — 97530 THERAPEUTIC ACTIVITIES: CPT | Performed by: OCCUPATIONAL THERAPIST

## 2020-03-12 PROCEDURE — 97110 THERAPEUTIC EXERCISES: CPT | Performed by: OCCUPATIONAL THERAPIST

## 2020-03-12 PROCEDURE — 97140 MANUAL THERAPY 1/> REGIONS: CPT | Performed by: OCCUPATIONAL THERAPIST

## 2020-03-12 NOTE — PROGRESS NOTES
Daily Note     Today's date: 3/12/2020  Patient name: Berlin Jeffries  : 1985  MRN: [id number]  Referring provider: Tree Aviles MD  Dx:   Encounter Diagnosis     ICD-10-CM    1  Shantelle Fredi disease (tenosynovitis) M65 4                   Subjective: I am doing better      Objective: See treatment diary below      Assessment: Tolerated treatment well  Patient has improved pain  Plan: Continue per plan of care  Precautions: universal      Manual  2/10 2/18 2/20 2/25 2/27 3/3 3/5 3/10 3/12    graston wrist 4m 5m 4m 4m 4m 4m 4m 4m 4m    MOB wrist 4m 5m 4m 4m 4m 4m 4m  4m 4m    MWM  2m 2m  2m 2m 2m 2m 2m 2m    MFR forearm 2m 3m 3m 2m 2m 2m 2m 2m 2m    Ktape L  applied     L          Exercise Diary  2/10 2/18 2/20 2/25 2/27 3/3 3/5 3/10 3/12    AROM wrist 2x10 2x10 2x10 2x10 2x10 2x10 2x10 2x10 2x10    PROM wrist 2x10 15x 15x 15x 15x 15x 15x 15x 15x    Thumb ROM 2x10 2x10 2x10 2x10 2x10 2x10 2x10 2x10 2x10    Thumb isometrics  5x  5x 5x        Wrist isometrics  5x 2x10 YFB           Pegs neutral     30x 30x 30x 30x 30x 30x    Wrist ecc    2#  3x10 2#  3x10 2#  3x10 2#  3x10 2#  3x10 2#  3x10    P/S hammer         Small  3x10                                                                                                                                                   HEP  5x day                 Modalities  2/10 2/18 2/20 2/25 2/27 3/3 3/5 3/10 3/12    MH 10m 10m 10m     10m 10m    US      8  8m  8  8m  8  8m   8w/c  8m      CP post     6m 6m 6m 6m   6m 6m

## 2020-03-16 ENCOUNTER — APPOINTMENT (OUTPATIENT)
Dept: OCCUPATIONAL THERAPY | Facility: CLINIC | Age: 35
End: 2020-03-16
Payer: COMMERCIAL

## 2020-03-17 ENCOUNTER — APPOINTMENT (OUTPATIENT)
Dept: OCCUPATIONAL THERAPY | Facility: CLINIC | Age: 35
End: 2020-03-17
Payer: COMMERCIAL

## 2020-03-18 ENCOUNTER — APPOINTMENT (OUTPATIENT)
Dept: OCCUPATIONAL THERAPY | Facility: CLINIC | Age: 35
End: 2020-03-18
Payer: COMMERCIAL

## 2020-03-23 ENCOUNTER — APPOINTMENT (OUTPATIENT)
Dept: OCCUPATIONAL THERAPY | Facility: CLINIC | Age: 35
End: 2020-03-23
Payer: COMMERCIAL

## 2020-03-24 ENCOUNTER — APPOINTMENT (OUTPATIENT)
Dept: OCCUPATIONAL THERAPY | Facility: CLINIC | Age: 35
End: 2020-03-24
Payer: COMMERCIAL

## 2020-03-26 ENCOUNTER — TELEMEDICINE (OUTPATIENT)
Dept: OBGYN CLINIC | Facility: CLINIC | Age: 35
End: 2020-03-26

## 2020-03-26 ENCOUNTER — APPOINTMENT (OUTPATIENT)
Dept: OCCUPATIONAL THERAPY | Facility: CLINIC | Age: 35
End: 2020-03-26
Payer: COMMERCIAL

## 2020-03-26 DIAGNOSIS — N91.2 AMENORRHEA: Primary | ICD-10-CM

## 2020-03-26 PROCEDURE — OBC: Performed by: NURSE PRACTITIONER

## 2020-03-26 NOTE — PROGRESS NOTES
OB Intake    Patient presents for OB intake via TEAMS VIDEO VISIT  Unplanned pregnancy, yet acceptable  FOB involved and supportive      Hx of  delivery prior to 36 weeks 6 days: no  Hx of hypertension:  no   Patient's last menstrual period was 2020 (exact date)  Estimated Date of Delivery: 10/31/20  Signs and Symptoms of pregnancy:  - breast tenderness yet still breast feeding Ethan Arcos  Plans to discontinue soon  - Constipation: no  - Headaches: no  - Cramping/spotting: no  - PICA cravings: no  - Diabetes: If you answer yes, please order 1 hr gtt testing, 50grams   History of gestational diabetes no   BMI >35  No, BMI of 31   Advance maternal age >35 yes   First degree relative with type 2 diabetes no   History of PCOS no   Current metformin use no   Prior history of macrosomia or LGA no    Prenatal labs req given   Last Pap:   normal pap with negative HR HPV  Varicella immune 10/12/18  Tdap - counseled to be given after 27 weeks  Influenza vaccine discussed and information sheet given  Vaccinated: yes  Immunization Record  Immunization History   Administered Date(s) Administered     Influenza (IM) Preservative Free 09/15/2016    Hep B, adult 2016, 2016, 2016    INFLUENZA 09/15/2017, 10/12/2018    Influenza, injectable, quadrivalent, preservative free 0 5 mL 10/12/2018    Influenza, seasonal, injectable 10/07/2019    MMR 2016    Tdap 2016, 2019     Hx of MRSA: no  Dental visit with last 6 months - yes  If no, recommendations discussed     Patient's PHQ-9 score today was 0    MyChart activated (not 1518 years of age)  - Yes    N[facility] Family Partnership: No  ONAF form submitted:no  Interview Education:    HIV and other prenatal tests  discussed  Risk factors identified by prenatal history discussed  Anticipated course of prenatal care discussed  Nutrition counseling; special diet, dietary precautions (mercury, listeriosis) discussed  Weight gain counseling discussed  Toxoplasmosis precautions (cats/raw meat/unwashed vegetables) 2 cats and Jamal Lovetter is changing the liter  Safe sexual activity discussed  Exercise discussed  Environmental/work hazards discussed  Avoidance of sauna or hot tubs discussed  Teratogens discussed  Travel-avoid travel where risk of congenitally transmitted infection(s) is high; COVID risk reduction  discussed  Tobacco/smoking cessation counseling (ask, advise, assess, assist and arrange) both are non smokers  Alcohol, illicit/recreational drugs discussed  Breastfeeding -plans to breastfeed  Screening for aneuploidy discussed  Use of any medications (including supplements, vitamins, herbs or OTC drugs) discussed  Indications for ultrasonography discussed  Intimate partner violence discussed  Seat belt use discussed  Childbirth classes/hospital facilities discussed    Interview done by : REED Kumar       03/26/20    Virtual Regular Visit    Problem List Items Addressed This Visit        Other    Amenorrhea - Primary    Relevant Orders    Human Immunodeficiency Virus 1/2 Antigen / Antibody ( Fourth Generation) with Reflex Testing    Urine culture    Urinalysis with microscopic    Hepatitis C antibody    Obstetric panel    Hemoglobin Electrophoresis    Glucose, 1H PG      Other Visit Diagnoses     BMI 31 0-31 9,adult        Relevant Orders    Glucose, 1H PG               Reason for visit is OB intake    Encounter provider REED Kumar    Provider located at [address] [place] [id number]      Recent Visits  No visits were found meeting these conditions     Showing recent visits within past 7 days and meeting all other requirements     Today's Visits  Date Type Provider Dept   03/26/20 Telemedicine Jona Kumar 61   Showing today's visits and meeting all other requirements     Future Appointments  Date Type Provider Dept   20 Jona Lam 61   Showing future appointments within next 150 days and meeting all other requirements        After connecting through [employer], the patient was identified by name and date of birth  Amy Rivera was informed that this is a telemedicine visit and that the visit is being conducted through [employer] which may not be secure and therefore, might not be HIPAA-compliant  My home office door was closed  No one else was in the room  She acknowledged consent and understanding of privacy and security of the video platform  The patient has agreed to participate and understands they can discontinue the visit at any time  Subjective  Amy Rivera is a 28 y  o  female who is having a TEAM VIDEO VISIT  today  Unplanned pregnancy yet acceptable  LMP of 2020 with EDC of 10/31/20  She believes she conceived on 2020  Does admit increased anxiety due to COVID-19 risk 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)  No other health concerns  Past Medical History:   Diagnosis Date    Anxiety     Varicella     childhood       Past Surgical History:   Procedure Laterality Date    WISDOM TOOTH EXTRACTION         Current Outpatient Medications   Medication Sig Dispense Refill    Multiple Vitamins-Minerals (MULTI-VITAMIN GUMMIES PO) Take by mouth      PRENATAL-DSS-CA-FE CBN-FA-DHA PO Take 1 tablet by mouth daily       No current facility-administered medications for this visit  No Known Allergies    Review of Systems-negtive except intermittent anxiety for which she declines treatment    Physical Exam n/a    I spent 60 minutes with the patient during this visit

## 2020-03-26 NOTE — PATIENT INSTRUCTIONS
Pregnancy Diet   WHAT YOU NEED TO KNOW:   What is a healthy diet during pregnancy? A healthy diet during pregnancy is a meal plan that provides the amount of calories and nutrients you need during pregnancy  Your body needs extra calories and nutrients to support your growing baby  You need to gain the right amount of weight for a healthy baby and pregnancy  Babies born at a healthy weight have a lower risk of certain health problems at birth and later in life  A healthy diet may help you avoid gaining too much weight  Too much weight gain may cause problems for you during your pregnancy and delivery  What should I avoid while I am pregnant? · Alcohol:  Do not drink alcohol during pregnancy  Alcohol can increase your risk of a miscarriage (losing your baby)  Your baby may also be born too small and have other health problems, such as learning problems later in life  · Caffeine: It is not clear how caffeine affects pregnancy  Limit your intake of caffeine to avoid possible health problems  Caffeine may be found in coffee, tea, cola, sports drinks, and chocolate  · Foods that contain mercury:  Mercury is naturally found in almost all types of fish and shellfish  Some types of fish absorb higher levels of mercury that can be harmful to an unborn baby  Eat only fish and shellfish that are low in mercury  Each week, you may eat up to 12 ounces of fish or shellfish that have low levels of mercury  These include shrimp, canned light tuna, salmon, pollock, and catfish  Eat only 6 ounces of albacore (white) tuna per week  Albacore tuna has more mercury than canned tuna  Do not eat shark, swordfish, colby mackerel, or tilefish  · Raw and undercooked foods: You should not eat undercooked or raw meat, poultry, eggs, fish, or shellfish (shrimp, crab, lobster)  Cook leftover foods and ready-to-eat foods such as hot dogs until they are steaming hot       · Unpasteurized food:  Unpasteurized foods are foods that have not gone through the heating process (pasteurization) that destroys bacteria  You should not drink milk, juice, or cheese that has not been pasteurized  This includes Brie, feta, Camembert, blue, and [place] cheeses  Which foods can I eat while I am pregnant? Eat a variety of foods from each of the food groups listed below  Your dietitian will tell you how many servings you should have from each food group each day to get enough calories  The amount of calories you need depends on your daily activity, your weight before pregnancy, and current weight gain  Healthcare providers divide pregnancy into 3 periods of time called trimesters  In the first trimester, you usually do not need extra calories  In the second and third trimesters, most women should eat about 300 extra calories each day  · Fruits and vegetables:  Half of your plate should contain fruits and vegetables  ¨ Fruits:  Choose fresh, canned, or dried fruit as often as possible  ¨ 1 cup of sliced, diced, cooked, or canned fruit (canned in light syrup or 100% juice)    ¨ 1 large peach, orange, or banana    ¨ ½ cup of dried fruit    ¨ 1 cup of fruit juice    ¨ Vegetables:  Eat more dark green, red, and orange vegetables  Dark green vegetables include broccoli, spinach, funmi lettuce, and nita greens  Examples of orange and red vegetables are carrots, sweet potatoes, winter squash, oranges, and red peppers  ¨ 1 cup of cooked or raw vegetables    ¨ 1 cup of vegetable juice    ¨ 2 cups of raw leafy greens    · Grains:  Half of the grains you eat each day should be whole grains       ¨ Whole grains:      ¨ ½ cup of cooked brown rice or cooked oatmeal    ¨ 1 cup (1 ounce) of whole-grain dry cereal    ¨ 1 slice of 169% whole-wheat or rye bread    ¨ 3 cups of popped popcorn    ¨ Other grains:      ¨ ½ cup of cooked white rice or pasta    ¨ ½ of an English muffin    ¨ 1 small flour or corn tortilla    ¨ 1 mini-bagel    · Dairy foods:  Choose fat-free or low-fat dairy foods:    ¨ 1½ ounces of hard cheese (mozzarella, Swiss, cheddar)     ¨ 1 cup (8 ounces) of low-fat or fat-free milk or yogurt    ¨ 1 cup of low-fat frozen yogurt or pudding    · Meat and other protein sources:  Choose lean meats and poultry  Bake, broil, and grill meat instead of frying it  Include a variety of seafood in place of some meat and poultry each week  Eat a variety of protein foods:    ¨ ½ ounce of nuts (12 almonds, 24 pistachios, 7 walnut halves) or 1 tablespoon of peanut butter (1 ounce)    ¨ ¼ cup of soy tofu or tempeh (1 ounce)    ¨ 1 egg    ¨ ¼ cup of cooked dried beans, peas, or lentils (1 ounce)    ¨ 1 small chicken breast or 1 small trout (about 3 ounces)    ¨ 1 salmon steak (4 to 6 ounces)    ¨ 1 small lean hamburger (2 to 3 ounces)    · Fats:  Limit saturated fats, trans fats, and cholesterol  These unhealthy fats are found in shortening, butter, stick margarine, and animal fat  Choose healthy fats such as polyunsaturated and monounsaturated fats:     ¨ 1 tablespoon of canola, olive, corn, sunflower, or soybean oil    ¨ 1 tablespoon of soft margarine    ¨ 1 teaspoon of mayonnaise    ¨ 2 tablespoons of salad dressing    ¨ ½ of an avocado  What vitamin and mineral supplements may I need? Your healthcare provider will tell you if you need a supplement and the type you should take  Talk to your healthcare provider before you take any other kind of supplement, including herbal (natural) supplements  · Prenatal vitamins:  Eat a variety of healthy foods, even if you take a prenatal vitamin  If you forget to take your vitamin, do not take double the amount the next day  · Folic acid:  You need at least 633 mcg of folic acid each day before you get pregnant  Folic acid helps to form your baby's brain and spinal cord in early pregnancy  During pregnancy, your daily need for folic acid increases to about 600 mcg   Get folic acid each day by eating citrus fruits and juices, green leafy vegetables, liver, or dried beans  Folic acid is also added to foods such as breakfast cereals, bread products, flour, and pasta  · Iron:  Iron is a mineral the body needs to make hemoglobin, which is a part of red blood cells  Hemoglobin helps your blood carry oxygen from the lungs to the rest of your body  Foods that are good sources of iron are meat, poultry, fish, beans, spinach, and fortified cereals and breads  Your body will absorb iron better from non-meat sources if you have a source of vitamin C at the same time  Drink tea and coffee separately from iron-fortified foods and iron supplements  You need about 30 mg of iron each day during pregnancy  · Calcium and vitamin D:  Women who do not eat dairy products may need a calcium and vitamin D supplement  Talk to your healthcare provider about calcium supplements if you do not regularly eat good sources of calcium  The amount of calcium you need is about 1,300 mg if you are between 15and 25years old and 1,000 mg if you are 23to 48years old  What diet changes may help if I have morning sickness? Morning sickness is common during the first few months of pregnancy  You may feel nauseated, and you may vomit several times each day  To improve symptoms of morning sickness, eat small, frequent meals instead of 3 large meals  Foods high in carbohydrate, such as crackers, dry toast, and pasta, may be easier for you to eat  Drink liquids between meals rather than with meals  What diet changes may decrease constipation? A high-fiber diet can improve the symptoms of constipation  Whole-grain breakfast cereals, whole-grain breads, and prune juice are high in fiber  Raw fruits and vegetables, and cooked beans are also good sources of fiber  It may also be helpful to increase your intake of fluids and get regular physical activity  Talk with your healthcare provider before you begin any exercise program    What diet changes may decrease heartburn? To improve the symptoms of heartburn, do not lie down right after you eat  When you do lie down, sleep with your head slightly elevated  Eat small, frequent meals instead of 3 large meals  It may also be helpful to avoid caffeine, chocolate, and spicy foods  How can I get enough calcium if I cannot tolerate dairy foods? If you cannot drink milk or eat dairy foods, try lactose-free or lactose-reduced milk or calcium-fortified soy milk  Ask your healthcare provider about pills you can take to help you digest milk products  Eat other drinks and foods that are fortified with calcium, such as orange juice  What other healthy guidelines should I follow? · Vegetarians and vegans: If you are a vegetarian or vegan, get enough protein, vitamin B12, and iron during your pregnancy  Some non-meat sources of these nutrients are fortified cereals, nut butters, soy products (tofu and soymilk), nuts, grains, and legumes  These nutrients are also found in eggs and milk products  · Cravings: You may have cravings for certain foods during your pregnancy  Foods that are high in calories, fat, and sugar should not replace healthy food choices  Some women have cravings for unusual substances such as alexander, dirt, laundry starch, ice, and chalk  This condition is called pica  These may lead to health problems such as anemia and cause other health problems  When should I contact my healthcare provider? · You are losing weight without trying  · You have cravings for substances such as alexander, dirt, laundry starch, or ice  · You have questions or concerns about your condition or care  CARE AGREEMENT:   You have the right to help plan your care  Discuss treatment options with your caregivers to decide what care you want to receive  You always have the right to refuse treatment  The above information is an  only  It is not intended as medical advice for individual conditions or treatments   Talk to your doctor, nurse or pharmacist before following any medical regimen to see if it is safe and effective for you  © 2017 [employer] Information is for End User's use only and may not be sold, redistributed or otherwise used for commercial purposes  All illustrations and images included in CareNotes® are the copyrighted property of A D A M [employer]  or Riki Mead

## 2020-03-30 ENCOUNTER — APPOINTMENT (OUTPATIENT)
Dept: OCCUPATIONAL THERAPY | Facility: CLINIC | Age: 35
End: 2020-03-30
Payer: COMMERCIAL

## 2020-03-31 ENCOUNTER — CLINICAL SUPPORT (OUTPATIENT)
Dept: URGENT CARE | Age: 35
End: 2020-03-31
Payer: COMMERCIAL

## 2020-03-31 ENCOUNTER — DOCUMENTATION (OUTPATIENT)
Dept: URGENT CARE | Age: 35
End: 2020-03-31

## 2020-03-31 ENCOUNTER — OFFICE VISIT (OUTPATIENT)
Dept: FAMILY MEDICINE CLINIC | Facility: CLINIC | Age: 35
End: 2020-03-31
Payer: COMMERCIAL

## 2020-03-31 ENCOUNTER — TELEPHONE (OUTPATIENT)
Dept: FAMILY MEDICINE CLINIC | Facility: CLINIC | Age: 35
End: 2020-03-31

## 2020-03-31 DIAGNOSIS — Z20.828 EXPOSURE TO SARS-ASSOCIATED CORONAVIRUS: Primary | ICD-10-CM

## 2020-03-31 DIAGNOSIS — H10.31 ACUTE BACTERIAL CONJUNCTIVITIS OF RIGHT EYE: Primary | ICD-10-CM

## 2020-03-31 DIAGNOSIS — Z20.828 EXPOSURE TO SARS-ASSOCIATED CORONAVIRUS: ICD-10-CM

## 2020-03-31 DIAGNOSIS — J30.2 SEASONAL ALLERGIC RHINITIS, UNSPECIFIED TRIGGER: ICD-10-CM

## 2020-03-31 PROCEDURE — 99214 OFFICE O/P EST MOD 30 MIN: CPT | Performed by: NURSE PRACTITIONER

## 2020-03-31 PROCEDURE — 87635 SARS-COV-2 COVID-19 AMP PRB: CPT | Performed by: FAMILY MEDICINE

## 2020-03-31 RX ORDER — TOBRAMYCIN 3 MG/ML
1 SOLUTION/ DROPS OPHTHALMIC
Qty: 1 BOTTLE | Refills: 0 | Status: SHIPPED | OUTPATIENT
Start: 2020-03-31 | End: 2020-04-05

## 2020-03-31 NOTE — TELEPHONE ENCOUNTER
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

## 2020-03-31 NOTE — PROGRESS NOTES
Virtual Regular Visit    Problem List Items Addressed This Visit     None      Visit Diagnoses     Acute bacterial conjunctivitis of right eye    -  Primary    Relevant Medications    tobramycin (TOBREX) 0 3 % SOLN    Seasonal allergic rhinitis, unspecified trigger            This 44-year-old female presents today for conjunctivitis  Suspected to be bacterial conjunctivitis  Recommend treat with tobramycin 0 3% ophthalmic solution, 1-2 drops every 4 hours while awake to right eye  Warm compresses to right eye 3-4 times daily, for 15-20 minutes  If symptoms are not significantly improving over the next 24 hours, she will call  If symptoms should worsen, advised to go the emergency room  For seasonal allergies, may take over-the-counter Claritin or Zyrtec, 10 mg daily, these medications are safe to use during pregnancy  Reason for visit is right eye redness and swelling  Encounter provider [address] [place] [address]    Provider located at [facility] [address]      Recent Visits  No visits were found meeting these conditions  Showing recent visits within past 7 days and meeting all other requirements     Today's Visits  Date Type Provider Dept   03/31/20 Office Visit Jessika Comer, [facility] today's visits and meeting all other requirements     Future Appointments  No visits were found meeting these conditions  Showing future appointments within next 150 days and meeting all other requirements        The patient was identified by name and date of birth  [place]  was informed that this is a telemedicine visit and that the visit is being conducted through [address] [place] [address] and patient was informed that this is not a secure, HIPAA-complaint platform  she agrees to proceed     My office door was closed  No one else was in the room  She acknowledged consent and understanding of privacy and security of the video platform   The patient has agreed to participate and understands they can discontinue the visit at any time  Patient is aware this is a billable service  Subjective  Pennie Roe is a 28 y o  female is presenting today for video visit  Right eye started with swelling, draining, and erythema 2 days ago  Initially, thought it was allergies, but it is not improving  No changes in vision  No eye pain  Feels like there is sandpaper in her eye  No fevers, chills, sweats, or feeling ill  She does have seasonal allergies with intermittent mild nasal congestion and scratchy throat  No cough  She is estimated to between 7-10 weeks pregnant  Currently working as an RN in the hospital setting  Does not care directly for any COVID-19 positive or suspected positive patients  Past Medical History:   Diagnosis Date    Anxiety     Varicella     childhood       Past Surgical History:   Procedure Laterality Date    WISDOM TOOTH EXTRACTION         Current Outpatient Medications   Medication Sig Dispense Refill    Multiple Vitamins-Minerals (MULTI-VITAMIN GUMMIES PO) Take by mouth      PRENATAL-DSS-CA-FE CBN-FA-DHA PO Take 1 tablet by mouth daily      tobramycin (TOBREX) 0 3 % SOLN Administer 1 drop to both eyes every 4 (four) hours while awake for 5 days 1 Bottle 0     No current facility-administered medications for this visit  No Known Allergies    Review of Systems   Constitutional: Positive for fatigue (due to early pregnancy)  Negative for chills, diaphoresis and fever  HENT: Positive for congestion (mild)  Eyes: Positive for discharge and redness  Negative for photophobia, pain (feels like sandpaper) and visual disturbance  Musculoskeletal: Positive for myalgias (mild aches beleived to be due to early pregnancy)  Physical Exam   Constitutional: She appears well-developed and well-nourished  No distress  HENT:   Head: Atraumatic  Eyes: EOM are normal  Right eye exhibits discharge   Right conjunctiva is injected  Upper and lower lids of right eye mildly edematous and pink  Neck: Normal range of motion  Pulmonary/Chest: Effort normal  No respiratory distress  No cough  No tachypnea   Skin:   Color pink   Psychiatric: She has a normal mood and affect  I spent 10 minutes with the patient during this visit

## 2020-04-01 ENCOUNTER — OFFICE VISIT (OUTPATIENT)
Dept: FAMILY MEDICINE CLINIC | Facility: CLINIC | Age: 35
End: 2020-04-01
Payer: COMMERCIAL

## 2020-04-01 VITALS
HEIGHT: 66 IN | WEIGHT: 206 LBS | TEMPERATURE: 100.6 F | DIASTOLIC BLOOD PRESSURE: 84 MMHG | SYSTOLIC BLOOD PRESSURE: 122 MMHG | BODY MASS INDEX: 33.11 KG/M2

## 2020-04-01 DIAGNOSIS — J02.9 PHARYNGITIS, UNSPECIFIED ETIOLOGY: Primary | ICD-10-CM

## 2020-04-01 PROCEDURE — 99214 OFFICE O/P EST MOD 30 MIN: CPT | Performed by: NURSE PRACTITIONER

## 2020-04-01 RX ORDER — AMOXICILLIN 500 MG/1
500 CAPSULE ORAL EVERY 12 HOURS SCHEDULED
Qty: 20 CAPSULE | Refills: 0 | Status: SHIPPED | OUTPATIENT
Start: 2020-04-01 | End: 2020-04-11

## 2020-04-03 ENCOUNTER — TELEPHONE (OUTPATIENT)
Dept: FAMILY MEDICINE CLINIC | Facility: CLINIC | Age: 35
End: 2020-04-03

## 2020-04-03 LAB — SARS-COV-2 RNA SPEC QL NAA+PROBE: NOT DETECTED

## 2020-04-04 ENCOUNTER — TELEPHONE (OUTPATIENT)
Dept: PERINATAL CARE | Facility: OTHER | Age: 35
End: 2020-04-04

## 2020-04-05 ENCOUNTER — TELEPHONE (OUTPATIENT)
Dept: URGENT CARE | Age: 35
End: 2020-04-05

## 2020-04-09 LAB
EXTERNAL ANTIBODY SCREEN: NORMAL
EXTERNAL HEMATOCRIT: 41.3 %
EXTERNAL HEMOGLOBIN: 13.9 G/DL
EXTERNAL HEPATITIS B SURFACE ANTIGEN: NORMAL
EXTERNAL HIV-1/2 AB-AG: NORMAL
EXTERNAL PLATELET COUNT: 303 K/ΜL
EXTERNAL RH FACTOR: POSITIVE
EXTERNAL RUBELLA IGG QUANTITATION: NORMAL
EXTERNAL SYPHILIS RPR SCREEN: NORMAL

## 2020-04-10 LAB
ABO GROUP BLD: NORMAL
APPEARANCE UR: ABNORMAL
BASOPHILS # BLD AUTO: 32 CELLS/UL (ref 0–200)
BASOPHILS NFR BLD AUTO: 0.5 %
BILIRUB UR QL STRIP: NEGATIVE
BLD GP AB SCN SERPL QL: NORMAL
COLOR UR: YELLOW
EOSINOPHIL # BLD AUTO: 63 CELLS/UL (ref 15–500)
EOSINOPHIL NFR BLD AUTO: 1 %
ERYTHROCYTE [DISTWIDTH] IN BLOOD BY AUTOMATED COUNT: 12.3 % (ref 11–15)
ERYTHROCYTE [DISTWIDTH] IN BLOOD BY AUTOMATED COUNT: 12.3 % (ref 11–15)
GLUCOSE 1H P 50 G GLC PO SERPL-MCNC: 102 MG/DL
GLUCOSE UR QL STRIP: NEGATIVE
HBV SURFACE AG SERPL QL IA: NORMAL
HCT VFR BLD AUTO: 41.5 % (ref 35–45)
HCT VFR BLD AUTO: 41.5 % (ref 35–45)
HCV AB S/CO SERPL IA: 0.02
HCV AB SERPL QL IA: NORMAL
HGB A MFR BLD: 96.8 %
HGB A2 MFR BLD: 2.2 % (ref 1.8–3.5)
HGB BLD-MCNC: 13.9 G/DL (ref 11.7–15.5)
HGB BLD-MCNC: 13.9 G/DL (ref 11.7–15.5)
HGB F MFR BLD: <1 %
HGB FRACT BLD-IMP: NORMAL
HGB UR QL STRIP: NEGATIVE
HIV 1+2 AB+HIV1 P24 AG SERPL QL IA: NORMAL
KETONES UR QL STRIP: NEGATIVE
LEUKOCYTE ESTERASE UR QL STRIP: NEGATIVE
LYMPHOCYTES # BLD AUTO: 1613 CELLS/UL (ref 850–3900)
LYMPHOCYTES NFR BLD AUTO: 25.6 %
MCH RBC QN AUTO: 31.3 PG (ref 27–33)
MCH RBC QN AUTO: 31.3 PG (ref 27–33)
MCHC RBC AUTO-ENTMCNC: 33.5 G/DL (ref 32–36)
MCV RBC AUTO: 93.5 FL (ref 80–100)
MCV RBC AUTO: 93.5 FL (ref 80–100)
MONOCYTES # BLD AUTO: 353 CELLS/UL (ref 200–950)
MONOCYTES NFR BLD AUTO: 5.6 %
NEUTROPHILS # BLD AUTO: 4240 CELLS/UL (ref 1500–7800)
NEUTROPHILS NFR BLD AUTO: 67.3 %
NITRITE UR QL STRIP: NEGATIVE
PH UR STRIP: 6 [PH] (ref 5–8)
PLATELET # BLD AUTO: 303 THOUSAND/UL (ref 140–400)
PMV BLD REES-ECKER: 10.5 FL (ref 7.5–12.5)
PROT UR QL STRIP: NEGATIVE
RBC # BLD AUTO: 4.44 MILLION/UL (ref 3.8–5.1)
RBC # BLD AUTO: 4.44 MILLION/UL (ref 3.8–5.1)
RH BLD: NORMAL
RPR SER QL: NORMAL
RUBV IGG SERPL IA-ACNC: 2.49 INDEX
SP GR UR STRIP: 1.02 (ref 1–1.03)
WBC # BLD AUTO: 6.3 THOUSAND/UL (ref 3.8–10.8)

## 2020-04-16 ENCOUNTER — ROUTINE PRENATAL (OUTPATIENT)
Dept: OBGYN CLINIC | Facility: CLINIC | Age: 35
End: 2020-04-16

## 2020-04-16 VITALS
DIASTOLIC BLOOD PRESSURE: 88 MMHG | WEIGHT: 209.8 LBS | SYSTOLIC BLOOD PRESSURE: 140 MMHG | BODY MASS INDEX: 34.38 KG/M2 | TEMPERATURE: 98 F

## 2020-04-16 DIAGNOSIS — L29.3 PERINEAL ITCHING, FEMALE: ICD-10-CM

## 2020-04-16 DIAGNOSIS — Z34.81 ENCOUNTER FOR SUPERVISION OF OTHER NORMAL PREGNANCY IN FIRST TRIMESTER: Primary | ICD-10-CM

## 2020-04-16 DIAGNOSIS — Z11.3 SCREENING FOR STD (SEXUALLY TRANSMITTED DISEASE): ICD-10-CM

## 2020-04-16 PROBLEM — Z34.90 SUPERVISION OF NORMAL PREGNANCY: Status: ACTIVE | Noted: 2020-04-16

## 2020-04-16 PROCEDURE — PNV: Performed by: OBSTETRICS & GYNECOLOGY

## 2020-04-16 RX ORDER — FLUOCINONIDE 0.5 MG/G
OINTMENT TOPICAL DAILY
Qty: 30 G | Refills: 0 | Status: SHIPPED | OUTPATIENT
Start: 2020-04-16 | End: 2020-12-08 | Stop reason: ALTCHOICE

## 2020-04-17 LAB
C TRACH RRNA SPEC QL NAA+PROBE: NOT DETECTED
N GONORRHOEA RRNA SPEC QL NAA+PROBE: NOT DETECTED

## 2020-04-22 ENCOUNTER — TELEPHONE (OUTPATIENT)
Dept: PERINATAL CARE | Facility: CLINIC | Age: 35
End: 2020-04-22

## 2020-04-23 ENCOUNTER — ROUTINE PRENATAL (OUTPATIENT)
Dept: PERINATAL CARE | Facility: CLINIC | Age: 35
End: 2020-04-23
Payer: COMMERCIAL

## 2020-04-23 ENCOUNTER — CONSULT (OUTPATIENT)
Dept: PERINATAL CARE | Facility: CLINIC | Age: 35
End: 2020-04-23
Payer: COMMERCIAL

## 2020-04-23 VITALS
SYSTOLIC BLOOD PRESSURE: 128 MMHG | HEIGHT: 66 IN | WEIGHT: 206.6 LBS | BODY MASS INDEX: 33.2 KG/M2 | DIASTOLIC BLOOD PRESSURE: 74 MMHG | HEART RATE: 88 BPM | TEMPERATURE: 97.9 F

## 2020-04-23 DIAGNOSIS — Z31.5 ENCOUNTER FOR PROCREATIVE GENETIC COUNSELING: ICD-10-CM

## 2020-04-23 DIAGNOSIS — O09.521 MULTIGRAVIDA OF ADVANCED MATERNAL AGE IN FIRST TRIMESTER: ICD-10-CM

## 2020-04-23 DIAGNOSIS — Z36.82 ENCOUNTER FOR (NT) NUCHAL TRANSLUCENCY SCAN: Primary | ICD-10-CM

## 2020-04-23 DIAGNOSIS — Z3A.10 10 WEEKS GESTATION OF PREGNANCY: ICD-10-CM

## 2020-04-23 DIAGNOSIS — O09.529 ANTEPARTUM MULTIGRAVIDA OF ADVANCED MATERNAL AGE: Primary | ICD-10-CM

## 2020-04-23 PROBLEM — O99.210 OBESITY AFFECTING PREGNANCY: Status: ACTIVE | Noted: 2020-04-23

## 2020-04-23 PROBLEM — Z3A.12 12 WEEKS GESTATION OF PREGNANCY: Status: ACTIVE | Noted: 2020-04-23

## 2020-04-23 PROBLEM — N91.2 AMENORRHEA: Status: RESOLVED | Noted: 2020-03-26 | Resolved: 2020-04-23

## 2020-04-23 PROCEDURE — 76813 OB US NUCHAL MEAS 1 GEST: CPT | Performed by: OBSTETRICS & GYNECOLOGY

## 2020-04-23 PROCEDURE — NC001 PR NO CHARGE

## 2020-04-23 PROCEDURE — 99241 PR OFFICE CONSULTATION NEW/ESTAB PATIENT 15 MIN: CPT | Performed by: OBSTETRICS & GYNECOLOGY

## 2020-04-27 ENCOUNTER — OFFICE VISIT (OUTPATIENT)
Dept: OBGYN CLINIC | Facility: CLINIC | Age: 35
End: 2020-04-27
Payer: COMMERCIAL

## 2020-04-27 VITALS
DIASTOLIC BLOOD PRESSURE: 73 MMHG | HEIGHT: 66 IN | SYSTOLIC BLOOD PRESSURE: 108 MMHG | HEART RATE: 89 BPM | BODY MASS INDEX: 33.11 KG/M2 | WEIGHT: 206 LBS

## 2020-04-27 DIAGNOSIS — M65.4 DE QUERVAIN'S DISEASE (TENOSYNOVITIS): Primary | ICD-10-CM

## 2020-04-27 PROCEDURE — 99213 OFFICE O/P EST LOW 20 MIN: CPT | Performed by: SURGERY

## 2020-04-27 PROCEDURE — 1036F TOBACCO NON-USER: CPT | Performed by: SURGERY

## 2020-04-29 LAB
# FETUSES US: 1
CFDNA.FET/TOTAL PLAS.CFDNA: NORMAL
FET CHR 13 TS PLAS.CFDNA QL: NEGATIVE
FET CHR 18 TS PLAS.CFDNA QL: NEGATIVE
FET CHR 21 TS PLAS.CFDNA QL: NEGATIVE
FET CHR X + Y ANEUP PLAS.CFDNA QL: NORMAL
FET CHROM X + Y ANEUP CFDNA IMP: NORMAL
FET Y CHROM PLAS.CFDNA QL: DETECTED
FET Y CHROM PLAS.CFDNA: NORMAL
GA (DAYS): 6 D
GA (WEEKS): 12 WK
MICRODELETION SYND BLD/T FISH: NORMAL
REF LAB TEST METHOD: NORMAL
SERVICE CMNT-IMP: NORMAL
SERVICE CMNT-IMP: NORMAL
SL AMB ABNORMAL MSS?: NORMAL
SL AMB ABNORMAL US?: NORMAL
SL AMB ADVANCED MATERNAL AGE?: YES
SL AMB MICRODELETION INTERP: NORMAL
SL AMB MICRODELETION: NORMAL
SL AMB PERSONAL/FAM HISTORY?: NORMAL
SL AMB SPECIFICATIONS: NORMAL

## 2020-05-04 ENCOUNTER — TELEMEDICINE (OUTPATIENT)
Dept: OBGYN CLINIC | Facility: CLINIC | Age: 35
End: 2020-05-04

## 2020-05-04 VITALS — SYSTOLIC BLOOD PRESSURE: 124 MMHG | DIASTOLIC BLOOD PRESSURE: 76 MMHG | WEIGHT: 208 LBS | BODY MASS INDEX: 34.09 KG/M2

## 2020-05-04 DIAGNOSIS — Z3A.12 12 WEEKS GESTATION OF PREGNANCY: ICD-10-CM

## 2020-05-04 DIAGNOSIS — O09.521 MULTIGRAVIDA OF ADVANCED MATERNAL AGE IN FIRST TRIMESTER: ICD-10-CM

## 2020-05-04 DIAGNOSIS — O99.211 OBESITY AFFECTING PREGNANCY IN FIRST TRIMESTER: ICD-10-CM

## 2020-05-04 DIAGNOSIS — Z34.81 ENCOUNTER FOR SUPERVISION OF OTHER NORMAL PREGNANCY IN FIRST TRIMESTER: Primary | ICD-10-CM

## 2020-05-04 PROCEDURE — PNV: Performed by: OBSTETRICS & GYNECOLOGY

## 2020-06-11 LAB
# FETUSES US: 1
AFP ADJ MOM SERPL: 1.01
AFP INTERP SERPL-IMP: NORMAL
AFP SERPL-MCNC: 32.2 NG/ML
AGE: NORMAL
DONATED EGG PATIENT QL: NO
GA CLIN EST: 17.4 WEEKS
GA METHOD: NORMAL
HX OF NTD NARR: NO
HX OF TRISOMY 21 NARR: NO
IDDM PATIENT QL: NO
NEURAL TUBE DEFECT RISK FETUS: NORMAL %
SL AMB REPEAT SPECIMEN: NO

## 2020-06-16 ENCOUNTER — OFFICE VISIT (OUTPATIENT)
Dept: FAMILY MEDICINE CLINIC | Facility: CLINIC | Age: 35
End: 2020-06-16
Payer: COMMERCIAL

## 2020-06-16 VITALS
HEART RATE: 94 BPM | BODY MASS INDEX: 35.39 KG/M2 | TEMPERATURE: 97.4 F | SYSTOLIC BLOOD PRESSURE: 110 MMHG | OXYGEN SATURATION: 97 % | RESPIRATION RATE: 16 BRPM | DIASTOLIC BLOOD PRESSURE: 70 MMHG | HEIGHT: 66 IN | WEIGHT: 220.2 LBS

## 2020-06-16 DIAGNOSIS — M25.561 ACUTE PAIN OF RIGHT KNEE: Primary | ICD-10-CM

## 2020-06-16 PROCEDURE — 99213 OFFICE O/P EST LOW 20 MIN: CPT | Performed by: NURSE PRACTITIONER

## 2020-06-16 PROCEDURE — 1036F TOBACCO NON-USER: CPT | Performed by: NURSE PRACTITIONER

## 2020-06-17 ENCOUNTER — ROUTINE PRENATAL (OUTPATIENT)
Dept: OBGYN CLINIC | Facility: CLINIC | Age: 35
End: 2020-06-17

## 2020-06-17 VITALS — WEIGHT: 224.2 LBS | BODY MASS INDEX: 36.74 KG/M2 | DIASTOLIC BLOOD PRESSURE: 72 MMHG | SYSTOLIC BLOOD PRESSURE: 126 MMHG

## 2020-06-17 DIAGNOSIS — Z3A.18 18 WEEKS GESTATION OF PREGNANCY: ICD-10-CM

## 2020-06-17 DIAGNOSIS — Z34.82 ENCOUNTER FOR SUPERVISION OF OTHER NORMAL PREGNANCY IN SECOND TRIMESTER: Primary | ICD-10-CM

## 2020-06-17 DIAGNOSIS — O09.522 MULTIGRAVIDA OF ADVANCED MATERNAL AGE IN SECOND TRIMESTER: ICD-10-CM

## 2020-06-17 DIAGNOSIS — O99.212 OBESITY AFFECTING PREGNANCY IN SECOND TRIMESTER: ICD-10-CM

## 2020-06-17 PROCEDURE — PNV: Performed by: OBSTETRICS & GYNECOLOGY

## 2020-07-01 ENCOUNTER — TELEPHONE (OUTPATIENT)
Dept: PERINATAL CARE | Facility: CLINIC | Age: 35
End: 2020-07-01

## 2020-07-01 NOTE — TELEPHONE ENCOUNTER
-------------------------------------------------------------    Attempted to reach patient by phone and left voicemail to confirm appointment for MFM ultrasound  1 support person ( must be over the age of 15) may accompany you for your appointment  You must wear a mask (covering nose and mouth) during your visit  You and your support person will be screened upon arrival   IF not feeling well- cough, fever, shortness of breath or any flu like symptoms contact your primary care physician or [phone]  Please call our office prior to entering the building  Check in and rooming questions will be done via phone  Inside office # provided:  [facility] line: [phone]  [facility] line:  [phone]  [place] line:  [phone] line:  [phone]  [facility] line:  [phone]  [place] line:  [phone]    [facility] does not allow cell phone use, recording device or streaming during ultrasound     Any questions with these instructions please call Maternal Fetal Medicine nurse line today @ # [phone]

## 2020-07-02 ENCOUNTER — ROUTINE PRENATAL (OUTPATIENT)
Dept: PERINATAL CARE | Facility: CLINIC | Age: 35
End: 2020-07-02
Payer: COMMERCIAL

## 2020-07-02 VITALS
TEMPERATURE: 97 F | WEIGHT: 228.6 LBS | DIASTOLIC BLOOD PRESSURE: 65 MMHG | SYSTOLIC BLOOD PRESSURE: 130 MMHG | HEIGHT: 65 IN | HEART RATE: 86 BPM | BODY MASS INDEX: 38.09 KG/M2

## 2020-07-02 DIAGNOSIS — Z36.86 ENCOUNTER FOR ANTENATAL SCREENING FOR CERVICAL LENGTH: ICD-10-CM

## 2020-07-02 DIAGNOSIS — O99.212 OBESITY AFFECTING PREGNANCY IN SECOND TRIMESTER: ICD-10-CM

## 2020-07-02 DIAGNOSIS — Z36.89 ENCOUNTER FOR FETAL ANATOMIC SURVEY: ICD-10-CM

## 2020-07-02 DIAGNOSIS — Z3A.20 20 WEEKS GESTATION OF PREGNANCY: Primary | ICD-10-CM

## 2020-07-02 PROCEDURE — 76811 OB US DETAILED SNGL FETUS: CPT | Performed by: OBSTETRICS & GYNECOLOGY

## 2020-07-02 PROCEDURE — 99211 OFF/OP EST MAY X REQ PHY/QHP: CPT | Performed by: OBSTETRICS & GYNECOLOGY

## 2020-07-02 PROCEDURE — 76817 TRANSVAGINAL US OBSTETRIC: CPT | Performed by: OBSTETRICS & GYNECOLOGY

## 2020-07-02 PROCEDURE — 3008F BODY MASS INDEX DOCD: CPT | Performed by: NURSE PRACTITIONER

## 2020-07-02 NOTE — PROGRESS NOTES
[address]: Ms Danette Esquivel was seen today at 20w5d for anatomic survey and cervical length screening ultrasound  See ultrasound report under "OB Procedures" tab    Please don't hesitate to contact our office with any concerns or questions   -Uvaldo Nissen, MD

## 2020-07-02 NOTE — PROGRESS NOTES
A transvaginal ultrasound was performed  Sonographer note on use of High Level Disinfection Process (Trophon) for transvaginal probe# 1 used, serial [id number]    Karen Perdomo

## 2020-07-28 ENCOUNTER — ROUTINE PRENATAL (OUTPATIENT)
Dept: OBGYN CLINIC | Facility: CLINIC | Age: 35
End: 2020-07-28

## 2020-07-28 VITALS — BODY MASS INDEX: 38.61 KG/M2 | DIASTOLIC BLOOD PRESSURE: 74 MMHG | SYSTOLIC BLOOD PRESSURE: 124 MMHG | WEIGHT: 232 LBS

## 2020-07-28 DIAGNOSIS — Z34.82 ENCOUNTER FOR SUPERVISION OF OTHER NORMAL PREGNANCY IN SECOND TRIMESTER: Primary | ICD-10-CM

## 2020-07-28 DIAGNOSIS — Z3A.24 24 WEEKS GESTATION OF PREGNANCY: ICD-10-CM

## 2020-07-28 DIAGNOSIS — O09.522 MULTIGRAVIDA OF ADVANCED MATERNAL AGE IN SECOND TRIMESTER: ICD-10-CM

## 2020-07-28 PROCEDURE — PNV: Performed by: OBSTETRICS & GYNECOLOGY

## 2020-07-28 NOTE — PROGRESS NOTES
This is a 28 y o   at 24w3d who presents for return OB visit  No complaints  Denies contractions, leakage, bleeding  Endorses fetal movement   BP: 124/74 TWlb  - reviewed ideal weight gain  Normal level 2, some missed heart views, repeat US at 32 wks  Gave 28 wk lab slip  Does not desire future childbearing  Will discuss contraception at future visits    F/up 4 wks

## 2020-08-23 LAB
BASOPHILS # BLD AUTO: 17 CELLS/UL (ref 0–200)
BASOPHILS NFR BLD AUTO: 0.2 %
EOSINOPHIL # BLD AUTO: 101 CELLS/UL (ref 15–500)
EOSINOPHIL NFR BLD AUTO: 1.2 %
ERYTHROCYTE [DISTWIDTH] IN BLOOD BY AUTOMATED COUNT: 12.2 % (ref 11–15)
GLUCOSE 1H P 50 G GLC PO SERPL-MCNC: 67 MG/DL
HCT VFR BLD AUTO: 38 % (ref 35–45)
HGB BLD-MCNC: 12.6 G/DL (ref 11.7–15.5)
LYMPHOCYTES # BLD AUTO: 1411 CELLS/UL (ref 850–3900)
LYMPHOCYTES NFR BLD AUTO: 16.8 %
MCH RBC QN AUTO: 32.1 PG (ref 27–33)
MCHC RBC AUTO-ENTMCNC: 33.2 G/DL (ref 32–36)
MCV RBC AUTO: 96.7 FL (ref 80–100)
MONOCYTES # BLD AUTO: 496 CELLS/UL (ref 200–950)
MONOCYTES NFR BLD AUTO: 5.9 %
NEUTROPHILS # BLD AUTO: 6376 CELLS/UL (ref 1500–7800)
NEUTROPHILS NFR BLD AUTO: 75.9 %
PLATELET # BLD AUTO: 224 THOUSAND/UL (ref 140–400)
PMV BLD REES-ECKER: 10.7 FL (ref 7.5–12.5)
RBC # BLD AUTO: 3.93 MILLION/UL (ref 3.8–5.1)
RPR SER QL: NORMAL
WBC # BLD AUTO: 8.4 THOUSAND/UL (ref 3.8–10.8)

## 2020-08-26 ENCOUNTER — ROUTINE PRENATAL (OUTPATIENT)
Dept: OBGYN CLINIC | Facility: CLINIC | Age: 35
End: 2020-08-26
Payer: COMMERCIAL

## 2020-08-26 VITALS — SYSTOLIC BLOOD PRESSURE: 118 MMHG | DIASTOLIC BLOOD PRESSURE: 64 MMHG | BODY MASS INDEX: 40.27 KG/M2 | WEIGHT: 242 LBS

## 2020-08-26 DIAGNOSIS — Z3A.28 28 WEEKS GESTATION OF PREGNANCY: ICD-10-CM

## 2020-08-26 DIAGNOSIS — Z34.83 ENCOUNTER FOR SUPERVISION OF OTHER NORMAL PREGNANCY IN THIRD TRIMESTER: Primary | ICD-10-CM

## 2020-08-26 DIAGNOSIS — Z23 NEED FOR TDAP VACCINATION: ICD-10-CM

## 2020-08-26 DIAGNOSIS — O09.523 MULTIGRAVIDA OF ADVANCED MATERNAL AGE IN THIRD TRIMESTER: ICD-10-CM

## 2020-08-26 PROCEDURE — 90715 TDAP VACCINE 7 YRS/> IM: CPT | Performed by: OBSTETRICS & GYNECOLOGY

## 2020-08-26 PROCEDURE — 90471 IMMUNIZATION ADMIN: CPT | Performed by: OBSTETRICS & GYNECOLOGY

## 2020-08-26 PROCEDURE — PNV: Performed by: OBSTETRICS & GYNECOLOGY

## 2020-08-26 NOTE — PROGRESS NOTES
Pt feels good, no complaints  Red folder reviewed with Pt  Tdap given in right arm without difficulty  Temp is 98 2, neg urine dip

## 2020-08-26 NOTE — PROGRESS NOTES
28 y o    female at 35 4 wga EGA for PNV  BP : 118/64  TWlb    Patient presents for return OB visit  She is doing well and has minimal complaints  Patient denies contractions, bleeding or leakage of fluid  Good fetal movement  28 wk labs reviewed  - GTT 67, Hgb 12 6, plts 224  Consent signed  Ok to blood transfusion  Full Code  Patient plans to breastfeed  Skin to skin, rooming in, delayed cord clamp,  pain management in labor discussed  TDAP given  Rhogam not indicated  Fetal kick counts reviewed  Contraception: undecided  Has been discussing male vasectomy as option with her , however he is not amenable to that  Discussed her options for permanent sterilization and procedure details as well as recommendation for entire tube to be removed at time of sterilization procedure for risk reduction of ovarian cancer later in life  Discussed different LARC options as well if permanent sterilization is not desired  Continue to discuss  Follow up in 2 weeks

## 2020-08-29 PROBLEM — Z3A.28 28 WEEKS GESTATION OF PREGNANCY: Status: ACTIVE | Noted: 2020-04-23

## 2020-09-09 ENCOUNTER — TELEPHONE (OUTPATIENT)
Dept: PERINATAL CARE | Facility: CLINIC | Age: 35
End: 2020-09-09

## 2020-09-09 ENCOUNTER — OFFICE VISIT (OUTPATIENT)
Dept: FAMILY MEDICINE CLINIC | Facility: CLINIC | Age: 35
End: 2020-09-09
Payer: COMMERCIAL

## 2020-09-09 VITALS
RESPIRATION RATE: 16 BRPM | HEIGHT: 65 IN | BODY MASS INDEX: 40.98 KG/M2 | TEMPERATURE: 98.5 F | SYSTOLIC BLOOD PRESSURE: 120 MMHG | HEART RATE: 78 BPM | WEIGHT: 246 LBS | DIASTOLIC BLOOD PRESSURE: 68 MMHG | OXYGEN SATURATION: 97 %

## 2020-09-09 DIAGNOSIS — Z23 INFLUENZA VACCINE NEEDED: ICD-10-CM

## 2020-09-09 DIAGNOSIS — M25.561 ACUTE PAIN OF RIGHT KNEE: Primary | ICD-10-CM

## 2020-09-09 PROCEDURE — 90471 IMMUNIZATION ADMIN: CPT | Performed by: NURSE PRACTITIONER

## 2020-09-09 PROCEDURE — 99213 OFFICE O/P EST LOW 20 MIN: CPT | Performed by: NURSE PRACTITIONER

## 2020-09-09 PROCEDURE — 90686 IIV4 VACC NO PRSV 0.5 ML IM: CPT | Performed by: NURSE PRACTITIONER

## 2020-09-09 NOTE — PROGRESS NOTES
FAMILY PRACTICE OFFICE VISIT       NAME: Joya Sánchez  AGE: 28 y o  SEX: female       : 1985        MRN: [id number]    Assessment and Plan     Problem List Items Addressed This Visit     None      Visit Diagnoses     Acute pain of right knee    -  Primary    Relevant Orders    Ambulatory referral to Physical Therapy    Ambulatory referral to Orthopedic Surgery    Influenza vaccine needed        Relevant Orders    influenza vaccine, quadrivalent, 0 5 mL, preservative-free, for adult and pediatric patients 6 mos+ (AFLURIA, FLUARIX, FLULAVAL, FLUZONE) (Completed)          1  Acute pain of right knee  Ambulatory referral to Physical Therapy    Ambulatory referral to Orthopedic Surgery   2  Influenza vaccine needed  influenza vaccine, quadrivalent, 0 5 mL, preservative-free, for adult and pediatric patients 6 mos+ (AFLURIA, FLUARIX, FLULAVAL, FLUZONE)     This 54-year-old gravid female presents today for persistent right knee pain  Pain began in   She was seen in office in , however she did not follow-up with recommendations to see physical therapy in Orthopedic surgery  Her knee was feeling better until about 1 week ago when pain started to worsen again  Suspect patellofemoral syndrome, but cannot exclude other causes  I have recommended she follow-up with physical therapy in Orthopedic surgery  Contact information has been provided for both  Rest, elevate, ice  I have provided a note for her excusing her from work this weekend  Influenza vaccination administered today  Chief Complaint     Chief Complaint   Patient presents with    Knee Pain     Pt is here for rt knee pain and swelling       History of Present Illness     Jerel Kendrick is a 54-year-old female presenting today for persistent right knee pain  She was seen in office on  for the same pain  She was advised to follow-up with orthopedics and physical therapy    Her knee was starting to feel better, and she did not pursue orthopedic or physical therapy follow-up  She is 30 weeks pregnant with her 2nd baby boy  Has a 3year-old child at home  Admits she has gained more weight than she should have this pregnancy  Right knee is painful and swollen  Pain worsened again about 1 week ago  Has a tingling sensation from her knee down toward her ankle  Pain is worse at nighttime, getting in out of the car, going up and down stairs, and working  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    She does a lot of walking and lifting  Works 12 hour shifts  Pain is around the patella  Knee pops  Knee feels like it will give out at times  No locking  She has tried using an Ace wrap, but this made pain worse  She had no known injuries  She was a runner in the past   She did note that pain seemed to get worse again after kneeling on right knee  Review of Systems   Review of Systems   Constitutional: Negative  Musculoskeletal: Positive for arthralgias (right knee pain and swelling as noted in HPI)         Active Problem List     Patient Active Problem List   Diagnosis    Anxiety disorder    Supervision of normal pregnancy    Perineal itching, female    30 weeks gestation of pregnancy    Obesity affecting pregnancy    Advanced maternal age in multigravida    Excessive fetal growth affecting management of mother       Past Medical History:  Past Medical History:   Diagnosis Date    Anxiety     Varicella     childhood       Past Surgical History:  Past Surgical History:   Procedure Laterality Date    WISDOM TOOTH EXTRACTION         Family History:  Family History   Problem Relation Age of Onset    Depression Mother     Crohn's disease Mother     Cancer Mother         breast    Mental illness Mother         anx    Breast cancer Mother     No Known Problems Father     Asthma Sister     Stroke Maternal Grandmother     Heart disease Maternal Grandfather         failure    Heart failure Maternal Grandfather     Cancer Paternal Grandfather         prostate    Crohn's disease Sister     No Known Problems Son        Social History:  Social History     Socioeconomic History    Marital status: Unknown     Spouse name: Dillan Nguyen    Number of children: Not on file    Years of education: Associate    Highest education level: Not on file   Occupational History    Occupation: RN   Social Needs    Financial resource strain: Not on file    Food insecurity     Worry: Not on file     Inability: Not on file   [employer] needs     Medical: Not on file     Non-medical: Not on file   Tobacco Use    Smoking status: Former Smoker     Packs/day: 0 50     Years: 10 00     Pack years: 5 00     Types: Cigarettes     Last attempt to quit: 2011     Years since quittin 4    Smokeless tobacco: Never Used   Substance and Sexual Activity    Alcohol use: Not Currently     Frequency: 2-4 times a month     Drinks per session: 1 or 2     Comment: socially     Drug use: Never    Sexual activity: Yes     Partners: Male     Birth control/protection: None     Comment:  x 7 years, [place]   Lifestyle    Physical activity     Days per week: Not on file     Minutes per session: Not on file    Stress: Not on file   Relationships    Social connections     Talks on phone: Not on file     Gets together: Not on file     Attends [employer] service: Not on file     Active member of club or organization: Not on file     Attends meetings of clubs or organizations: Not on file     Relationship status: Not on file    Intimate partner violence     Fear of current or ex partner: Not on file     Emotionally abused: Not on file     Physically abused: Not on file     Forced sexual activity: Not on file   Other Topics Concern    Not on file   Social History Narrative    Not on file       I have reviewed the patient's medical history in detail; there are no changes to the history as noted in the electronic medical record  Objective     Vitals:    09/09/20 1400   BP: 120/68   Pulse: 78   Resp: 16   Temp: 98 5 °F (36 9 °C)   SpO2: 97%   Weight: 112 kg (246 lb)   Height: 5' 5" (1 651 m)     Wt Readings from Last 3 Encounters:   09/10/20 111 kg (244 lb 6 4 oz)   09/10/20 111 kg (244 lb 9 6 oz)   09/09/20 112 kg (246 lb)     Physical Exam  Vitals signs and nursing note reviewed  Constitutional:       General: She is not in acute distress  Appearance: Normal appearance  She is not ill-appearing, toxic-appearing or diaphoretic  Musculoskeletal:      Right knee: She exhibits decreased range of motion (reduced flexion) and swelling  She exhibits no ecchymosis, no deformity and no erythema  Tenderness found  Medial joint line and lateral joint line tenderness noted  Neurological:      Mental Status: She is alert and oriented to person, place, and time  Gait: Gait abnormal (limping)  Psychiatric:         Mood and Affect: Mood normal          Behavior: Behavior normal             ALLERGIES:  No Known Allergies    Current Medications     Current Outpatient Medications   Medication Sig Dispense Refill    fluocinonide (LIDEX) 0 05 % ointment Apply topically daily 30 g 0    PRENATAL-DSS-CA-FE CBN-FA-DHA PO Take 1 tablet by mouth daily      Multiple Vitamins-Minerals (MULTI-VITAMIN GUMMIES PO) Take by mouth       No current facility-administered medications for this visit            Health Maintenance     Health Maintenance   Topic Date Due    Annual Physical  10/08/2020    Depression Screening PHQ  03/26/2021    BMI: Adult  09/10/2021    Cervical Cancer Screening  11/06/2023    DTaP,Tdap,and Td Vaccines (4 - Td) 08/26/2030    HIV Screening  Completed    Hepatitis C Screening  Completed    Influenza Vaccine  Completed    Pneumococcal Vaccine: Pediatrics (0 to 5 Years) and At-Risk Patients (6 to 59 Years)  Aged Out    HIB Vaccine  Aged Out    Hepatitis B Vaccine  Aged Out    IPV Vaccine Aged Out    Hepatitis A Vaccine  Aged Out    Meningococcal ACWY Vaccine  Aged Out    HPV Vaccine  Aged Out     Immunization History   Administered Date(s) Administered     Influenza (IM) Preservative Free 09/15/2016    Hep B, adult 06/06/2016, 07/11/2016, 12/07/2016    INFLUENZA 09/15/2017, 10/12/2018    Influenza, injectable, quadrivalent, preservative free 0 5 mL 10/12/2018, 09/09/2020    Influenza, seasonal, injectable 10/07/2019    MMR 06/06/2016    Tdap 06/06/2016, 03/13/2019, 08/26/2020       REED Beyer

## 2020-09-09 NOTE — TELEPHONE ENCOUNTER
Spoke with patient and confirmed appointment with MFM  1 support person ( must be over age of 15) may accompany patient  Will you and your support person be able to wear a mask ,without a valve , during entire appointment? YES   To minimize your exposure in our waiting area,check in and rooming questions will be done via phone  When you arrive in the parking lot please call the following inside line # prior to entering office:    [place] [place] [phone] line: [phone] line:  [address] line: [phone]  [place]y [place] line:  [phone]  [place] line: [phone]    Have you or your support person traveled outside the state in the last 2 weeks? NO   If yes, what state did you travel to? NO     Do you or your support person have:  Fever or flu- like symptoms? NO  Symptoms of upper respiratory infection like runny nose, sore throat or cough? NO  Do you have new headache that you have not had in the past?NO  Have you experienced any new shortness of breath recently? NO  Do you have any new loss of taste or smell? NO  Do you have any new diarrhea, nausea or vomiting? NO  Have you recently been in contact with anyone who has been sick or diagnosed with COVID-19 infection? NO  Have you been recommended to quarantine because of an exposure to a confirmed positive COVID19 person? NO  You and your support person will have temperature screening upon arrival   Patient verbalized understanding of all instructions

## 2020-09-09 NOTE — LETTER
September 9, 2020     Patient: Kelin Bullock   YOB: 1985   Date of Visit: 9/9/2020       To Whom it May Concern:    Kelin Bullock is under my professional care  She was seen in my office on 9/9/2020  She may return to work on 9/14/2020  If you have any questions or concerns, please don't hesitate to call           Sincerely,          REED Ford        CC: No Recipients

## 2020-09-10 ENCOUNTER — ULTRASOUND (OUTPATIENT)
Dept: PERINATAL CARE | Facility: CLINIC | Age: 35
End: 2020-09-10
Payer: COMMERCIAL

## 2020-09-10 ENCOUNTER — ROUTINE PRENATAL (OUTPATIENT)
Dept: OBGYN CLINIC | Facility: CLINIC | Age: 35
End: 2020-09-10

## 2020-09-10 VITALS
BODY MASS INDEX: 40.75 KG/M2 | SYSTOLIC BLOOD PRESSURE: 133 MMHG | DIASTOLIC BLOOD PRESSURE: 75 MMHG | HEIGHT: 65 IN | TEMPERATURE: 97.8 F | HEART RATE: 99 BPM | WEIGHT: 244.6 LBS

## 2020-09-10 VITALS — DIASTOLIC BLOOD PRESSURE: 72 MMHG | BODY MASS INDEX: 40.67 KG/M2 | WEIGHT: 244.4 LBS | SYSTOLIC BLOOD PRESSURE: 124 MMHG

## 2020-09-10 DIAGNOSIS — Z3A.30 30 WEEKS GESTATION OF PREGNANCY: ICD-10-CM

## 2020-09-10 DIAGNOSIS — Z3A.30 30 WEEKS GESTATION OF PREGNANCY: Primary | ICD-10-CM

## 2020-09-10 DIAGNOSIS — O36.63X0 EXCESSIVE FETAL GROWTH AFFECTING MANAGEMENT OF PREGNANCY IN THIRD TRIMESTER, SINGLE OR UNSPECIFIED FETUS: ICD-10-CM

## 2020-09-10 DIAGNOSIS — Z36.89 ENCOUNTER FOR ULTRASOUND TO ASSESS FETAL GROWTH: Primary | ICD-10-CM

## 2020-09-10 DIAGNOSIS — O09.523 MULTIGRAVIDA OF ADVANCED MATERNAL AGE IN THIRD TRIMESTER: ICD-10-CM

## 2020-09-10 DIAGNOSIS — IMO0002 EVALUATE ANATOMY NOT SEEN ON PRIOR SONOGRAM: ICD-10-CM

## 2020-09-10 PROBLEM — O36.60X0 EXCESSIVE FETAL GROWTH AFFECTING MANAGEMENT OF MOTHER: Status: ACTIVE | Noted: 2020-09-10

## 2020-09-10 PROCEDURE — 99242 OFF/OP CONSLTJ NEW/EST SF 20: CPT | Performed by: OBSTETRICS & GYNECOLOGY

## 2020-09-10 PROCEDURE — PNV: Performed by: OBSTETRICS & GYNECOLOGY

## 2020-09-10 PROCEDURE — 76816 OB US FOLLOW-UP PER FETUS: CPT | Performed by: OBSTETRICS & GYNECOLOGY

## 2020-09-10 NOTE — LETTER
September 10, 2020     DO [address]  [address]  [facility]    Patient: Zhao Lyles   YOB: 1985   Date of Visit: 9/10/2020       Dear Dr Sulaiman Mendoza:    Thank you for referring Zhao Lyles to me for evaluation  Below are my notes for this consultation  If you have questions, please do not hesitate to call me  I look forward to following your patient along with you  Sincerely,        Uvaldo Nissen, MD        CC: No Recipients  Uvaldo Nissen, MD  9/10/2020 12:03 PM  Sign when Signing Visit  [address]: Ms Danette Esquivel was seen today at 30w5d for fetal growth and followup missed anatomy ultrasound  See ultrasound report under "OB Procedures" tab       AC >95%ile, EFW 90%ile, will re-evaluate at 36 weeks and start antepartum fetal surveillance at the same time      Please don't hesitate to contact our office with any concerns or questions   -Uvaldo Nissen, MD

## 2020-09-10 NOTE — PROGRESS NOTES
[address]: Ms Nithya ePnn was seen today at 30w5d for fetal growth and followup missed anatomy ultrasound  See ultrasound report under "OB Procedures" tab       AC >95%ile, EFW 90%ile, will re-evaluate at 36 weeks and start antepartum fetal surveillance at the same time      Please don't hesitate to contact our office with any concerns or questions   -Christine Infante MD

## 2020-09-10 NOTE — PROGRESS NOTES
This is a 28 y o   at 30w5d who presents for return OB visit  No complaints  Denies contractions, leakage, bleeding  Endorses fetal movement   BP: 124/72 TWlb     Growth US today: EFW 90%, AC > 95%ile  Has follow up growth US scheduled  Excess weight gain: Discussed nutrition, exercise, weight management during pregnancy  Pt unable to exercise due to knee pain, is seeing orthopedics this weekend  Encouraged PT  Briefly reviewed birth trauma/obstetric injury with LGA babies  BMI 40:  Will need weekly NSTs starting at 36 wks  F/up 2 wks

## 2020-09-12 ENCOUNTER — OFFICE VISIT (OUTPATIENT)
Dept: OBGYN CLINIC | Facility: HOSPITAL | Age: 35
End: 2020-09-12
Payer: COMMERCIAL

## 2020-09-12 ENCOUNTER — HOSPITAL ENCOUNTER (OUTPATIENT)
Dept: RADIOLOGY | Facility: HOSPITAL | Age: 35
Discharge: HOME | End: 2020-09-12

## 2020-09-12 VITALS
HEIGHT: 66 IN | DIASTOLIC BLOOD PRESSURE: 81 MMHG | BODY MASS INDEX: 40.05 KG/M2 | HEART RATE: 93 BPM | SYSTOLIC BLOOD PRESSURE: 123 MMHG

## 2020-09-12 DIAGNOSIS — M22.2X1 PATELLOFEMORAL PAIN SYNDROME OF RIGHT KNEE: ICD-10-CM

## 2020-09-12 DIAGNOSIS — M25.561 ACUTE PAIN OF RIGHT KNEE: Primary | ICD-10-CM

## 2020-09-12 DIAGNOSIS — M25.561 ACUTE PAIN OF RIGHT KNEE: ICD-10-CM

## 2020-09-12 PROCEDURE — 99213 OFFICE O/P EST LOW 20 MIN: CPT | Performed by: PHYSICIAN ASSISTANT

## 2020-09-12 NOTE — PROGRESS NOTES
Assessment/Plan   Diagnoses and all orders for this visit:    Acute pain of right knee    Patellofemoral pain syndrome of right knee  - Meniscal injury also a consideration, but thought to be less likely  At this point, the treatment would still be PT   - Start PT   - Ice as needed  - Avoid kneeling, squatting as able  - Follow up with  sports medicine           Subjective   Patient ID: Valentina Hemphill is a 28 y o  female  Vitals:    09/12/20 0907   BP: 123/81   Pulse: 80     34yo female comes in for an evaluation of her right knee  She has been having anterior knee pain on and off since June  There was no specific injury  The pain tends to increase with use in general   The pain is dull in character, mild in severity, pain does not radiate and is not associated with numbness  She describes crepitus when she tries to knee or squat down  She is not able to take NSAIDs or other medications right now due to pregnancy  The pain is dull in character, mild in severity, pain does not radiate and is not associated with numbness            The following portions of the patient's history were reviewed and updated as appropriate: allergies, current medications, past family history, past medical history, past social history, past surgical history and problem list     Review of Systems  Ortho Exam  Past Medical History:   Diagnosis Date    Anxiety     Varicella     childhood     Past Surgical History:   Procedure Laterality Date    WISDOM TOOTH EXTRACTION       Family History   Problem Relation Age of Onset    Depression Mother     Crohn's disease Mother     Cancer Mother         breast    Mental illness Mother         anx    Breast cancer Mother     No Known Problems Father     Asthma Sister     Stroke Maternal Grandmother     Heart disease Maternal Grandfather         failure    Heart failure Maternal Grandfather     Cancer Paternal Grandfather         prostate    Crohn's disease Sister     No Known Problems Son      Social History     Occupational History    Occupation: RN   Tobacco Use    Smoking status: Former Smoker     Packs/day: 0 50     Years: 10 00     Pack years: 5 00     Types: Cigarettes     Last attempt to quit: 2011     Years since quittin 4    Smokeless tobacco: Never Used   Substance and Sexual Activity    Alcohol use: Not Currently     Frequency: 2-4 times a month     Drinks per session: 1 or 2     Comment: socially     Drug use: Never    Sexual activity: Yes     Partners: Male     Birth control/protection: None     Comment:  x 7 years, [place] Block       Review of Systems   Constitutional: Negative  HENT: Negative  Eyes: Negative  Respiratory: Negative  Cardiovascular: Negative  Gastrointestinal: Negative  Endocrine: Negative  Genitourinary: Negative  Musculoskeletal: As below      Allergic/Immunologic: Negative  Neurological: Negative  Hematological: Negative  Psychiatric/Behavioral: Negative          Objective   Physical Exam    · Constitutional: Awake, Alert, Oriented  · Eyes: EOMI  · Psych: Mood and affect appropriate  · Heart: regular rate and rhythm  · Lungs: No audible wheezing  · Abdomen: soft  · Lymph: no lymphedema   right Knee:  - Appearance   No swelling, discoloration, deformity, or ecchymosis  - Effusion   mild  - Palpation   The pain surrounds the patella, but no specific point tenderness   No tenderness of the patella, patellar tendon, pes anserine, medial / lateral joint line, MCL, LCL, medial / lateral plateau and No tenderness about the medial / lateral joint line, patella, patellar tendon, MCL, LCL, hamstrings, or medial / lateral tibial plateau   - ROM   Extension: 0 and Flexion: 130  - Special Tests   Max's Test negative, Lachman's Test negative, Anterior Drawer Test negative, Posterior Drawer Test negative, Valgus Stress Test negative, Varus Stress Test negative and Patellar apprehension negative  - Motor   normal 5/5 in all planes  - NVI distally    I held off on xray today due to current pregnancy

## 2020-09-14 ENCOUNTER — TELEPHONE (OUTPATIENT)
Dept: OBGYN CLINIC | Facility: HOSPITAL | Age: 35
End: 2020-09-14

## 2020-09-14 NOTE — TELEPHONE ENCOUNTER
With no kneeling and squatting she cannot perform her job  She is wondering how long it will be before she can kneel and squat 1-2 weeks to rest it? She does not want to go out on maternity leave yet

## 2020-09-14 NOTE — TELEPHONE ENCOUNTER
What we discussed was avoiding kneeling and squatting  If she wants to go on maternity leave early - that's between her and her ob  I am willing to write a note with kneeling and squatting restriction if needed

## 2020-09-14 NOTE — TELEPHONE ENCOUNTER
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

## 2020-09-15 ENCOUNTER — EVALUATION (OUTPATIENT)
Dept: PHYSICAL THERAPY | Facility: CLINIC | Age: 35
End: 2020-09-15
Payer: COMMERCIAL

## 2020-09-15 DIAGNOSIS — M25.561 ACUTE PAIN OF RIGHT KNEE: ICD-10-CM

## 2020-09-15 PROCEDURE — 97162 PT EVAL MOD COMPLEX 30 MIN: CPT | Performed by: PHYSICAL THERAPIST

## 2020-09-15 PROCEDURE — 97110 THERAPEUTIC EXERCISES: CPT | Performed by: PHYSICAL THERAPIST

## 2020-09-15 NOTE — PROGRESS NOTES
PT Evaluation     Today's date: 9/15/2020  Patient name: Kenton Browning  : 1985  MRN: [id number]  Referring provider: REED Ceja  Dx:   Encounter Diagnosis     ICD-10-CM    1  Acute pain of right knee  M25 [id number] Ambulatory referral to Physical Therapy                  Assessment  Assessment details: Kenton Browning is a 28 y o  female present with:   Acute pain of right knee  Suspect possible meniscus or vastus lateralis injury  Romina Skinner has the above listed impairments and will benefit from skilled PT to improve deficits to return to prior level of function  Impairments: abnormal muscle firing, abnormal or restricted ROM, activity intolerance, impaired physical strength, lacks appropriate home exercise program and pain with function  Understanding of Dx/Px/POC: good   Prognosis: good    Goals  Impairment Goals  - Decrease pain   - Improve ROM to [facility]  - Increase strength     Functional Goals  - Return to Prior Level of Function  - Increase Functional Status Measure  - Patient will be independent with HEP    Plan  Patient would benefit from: skilled PT  Planned therapy interventions: joint mobilization, manual therapy, patient education, postural training, activity modification, abdominal trunk stabilization, body mechanics training, flexibility, functional ROM exercises, graded exercise, home exercise program, neuromuscular re-education, strengthening, stretching, therapeutic activities and therapeutic exercise  Frequency: 2x week  Duration in weeks: 8  Treatment plan discussed with: patient        Subjective Evaluation    History of Present Illness  Mechanism of injury: Pt presents to PT with acute R knee pain  She noticed pain after one shift after a lot of kneeling and bending early last summer  This  resolved after a few weeks but then returned 2-3 wks ago    Pt is 7 mo pregnant (2nd pregnancy)  Feels lateral knee pain that radiates into her toes (as well as tingling and numbness), also c/o frequent popping and cracking  Knee feels as though it will give out    No previous knee injuries  Pain is relatively constant, including when lying  Pain  Current pain ratin  At best pain ratin  At worst pain ratin  Aggravating factors: stair climbing and walking    Treatments  No previous or current treatments  Patient Goals  Patient goals for therapy: decreased pain and independence with ADLs/IADLs          Objective  Mild edema lateral aspect of R knee  TTP distal vastus lateralis, tightness palpated  Mild TTP med and lateral patellar facet, lateral joint line  Patellar mobility wnl  ROM wnl, pain at end range flexion   + Max test  No ligamentous laxity noted    MMT  Quads: 4-/5 with pain  Hams 4/5 with pain           Precautions: pregnancy      Manuals 9/15            GT             laser                                       Neuro Re-Ed                                                                                                        Ther Ex             HEP IP                                                                                                       Ther Activity                                       Gait Training                                       Modalities

## 2020-09-16 ENCOUNTER — TELEPHONE (OUTPATIENT)
Dept: PERINATAL CARE | Facility: CLINIC | Age: 35
End: 2020-09-16

## 2020-09-16 ENCOUNTER — TELEPHONE (OUTPATIENT)
Dept: PERINATAL CARE | Facility: OTHER | Age: 35
End: 2020-09-16

## 2020-09-16 NOTE — TELEPHONE ENCOUNTER
Patient is calling to request a work note stating the kneeling and squatting restrictions  Please notify patient when that note is available

## 2020-09-16 NOTE — TELEPHONE ENCOUNTER
Attempted to reach patient by phone and left voicemail to confirm appointment for MFM ultrasound  1 support person (must be over the age of 15) may accompany you for your appointment  If you or your support person have traveled outside the state in the past 2 weeks, please call and notify our office today #[phone]  You and your support person must wear a mask ,covering nose and mouth,during your entire visit  You and your support person will have temperature screened upon arrival     To minimize your exposure in our waiting room, please call our office prior to entering the building  Check in and rooming questions will be done via phone  We will give you directions when to enter for your appointment  Inside office # provided:    Greg line:  [phone]    IF you are not feeling well- cough, fever, shortness of breath or any flu like symptoms, contact your primary care physician or [phone] Randy Giraldo    Any questions with these instructions please call Maternal Fetal Medicine nurse line today @ # [phone]

## 2020-09-16 NOTE — TELEPHONE ENCOUNTER
Called pt to let her know that MFM doesn't need to start weekly NST/REJI until after 10/22 appt  Left voicemail for pt that appts until 10/22 have been cancelled  If pt wants to call back and schedule weekly appts after that time, she can call or we will schedule at 10/22 appt  Request pt to call back if any questions

## 2020-09-17 ENCOUNTER — APPOINTMENT (OUTPATIENT)
Dept: RADIOLOGY | Facility: AMBULARY SURGERY CENTER | Age: 35
End: 2020-09-17
Attending: ORTHOPAEDIC SURGERY
Payer: COMMERCIAL

## 2020-09-17 ENCOUNTER — OFFICE VISIT (OUTPATIENT)
Dept: OBGYN CLINIC | Facility: CLINIC | Age: 35
End: 2020-09-17
Payer: COMMERCIAL

## 2020-09-17 VITALS
HEIGHT: 66 IN | DIASTOLIC BLOOD PRESSURE: 79 MMHG | HEART RATE: 101 BPM | BODY MASS INDEX: 39.37 KG/M2 | SYSTOLIC BLOOD PRESSURE: 123 MMHG | WEIGHT: 245 LBS

## 2020-09-17 DIAGNOSIS — M25.561 ACUTE PAIN OF RIGHT KNEE: Primary | ICD-10-CM

## 2020-09-17 DIAGNOSIS — M25.561 ACUTE PAIN OF RIGHT KNEE: ICD-10-CM

## 2020-09-17 PROCEDURE — 99214 OFFICE O/P EST MOD 30 MIN: CPT | Performed by: ORTHOPAEDIC SURGERY

## 2020-09-17 PROCEDURE — 73562 X-RAY EXAM OF KNEE 3: CPT

## 2020-09-17 NOTE — TELEPHONE ENCOUNTER
Renee Simpson   It looks like she saw Dr Ryley Villegas today and he took her out of work completely

## 2020-09-17 NOTE — LETTER
September 17, 2020     Patient: Jerel Kendrick   YOB: 1985   Date of Visit: 9/17/2020       To Whom it May Concern:    Jerel Kendrick is under my professional care  She was seen in my office on 9/17/2020  She will remain out of work until her follow up visit in 4 weeks  If you have any questions or concerns, please don't hesitate to call           Sincerely,          Lucie Sees, DO        CC: No Recipients

## 2020-09-17 NOTE — PATIENT INSTRUCTIONS
PATELLOFEMORAL SYNDROME-Nieves TAPING TECHNIQUE    Search Leukotape P tape and Cover roll stretch tape on  [employer]  Leukotape P is typically 1 5in x 15 yards, Cover roll stretch tape is typically 2in x 10 yards        How to apply:  1  Place cover roll tape over knee cap  This protects the skin  2  Apply Leukotape over the cover roll tape  Use the Leukotape to pull the knee cap to the middle of the body (medial side of knee) to prevent lateral (outside) tracking of the knee cap  3  Wear the tape for 3 days (72 hrs) straight, then take off one day (24 hrs) off, then repeat  4  Visit youtube  com and search Nieves tape for the knee to watch a video on how to apply tape  a  Video titled Nieves Taping of the knee created by [employer] recommended  What does Nieves taping technique do?  ? Patellofemoral syndrome is when the inside quadriceps muscle, called the VMO muscle, becomes weak due to a number of factors  This causes the Patellofemoral ligament, which is the only ligament holding the patella (knee cap) in place, to become weak as well  When the ligament becomes weak, the knee cap drifts or tracks too far to the lateral side of the knee (outside knee) which causes tension on this ligament  The knee cap hits the lateral area of the femur, resulting in pain or discomfort around the front of the knee  ? Physical Therapy is sometimes used to strengthen the weak muscles, such as the VMO, to correct this problem  When the tape is applied correctly, it helps to realign the knee cap to the center of the knee  This helps correct for the lateral tracking of the knee cap and relieve discomfort  ? You can search online for exercises that can help strengthen the VMO quadriceps muscle or attend physical therapy

## 2020-09-17 NOTE — PROGRESS NOTES
Patient Name:  Mary Stiles  MRN:  [id number]    [address] [place] [address]     Right knee pain  Right patellofemoral syndrome    Jin Padilla is experiencing significant pain secondary to patellar malalignment and syndrome  Conservative treatment recommendations are as follows:    - Continue PT as already begun  - Jesus taping   - Avoid activities that reproduce pain such as bending, squatting, prolonged standing  She will be given a note to remain out of work until follow up in 4 weeks  - Possible future viscosupplementation was discussed if all else fails once her pregnancy is over  Chief Complaint     Right knee pain    History of the Present Illness     Mary Stiles is a 28 y o  female whp is 32 weeks pregnant and 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[facility] [employer]  She presents today for evaluation of significant right knee discomfort that began back in June without injury  Her original discomfort eventually subsided with time but recently flared up significantly without exacerbating event  She notes right knee pain and instability increasing with standing, walking, stair and when trying to get into a squatting position  She also notes clicking  She is having difficulty with daily activities in housework, caring for her 21 month old and at work  Joya also notes increased pain going from an extended to flexed position surrounding the patella  Due to her pregnancy she is unable to take NSAIDs or obtain an Xray  She started PT ordered by her PCP recently and plans to continue this  Physical Exam     /79   Pulse 101   Ht 5' 5 5" (1 664 m)   Wt 111 kg (245 lb)   LMP 01/25/2020 (Exact Date)   BMI 40 15 kg/m²     Knee Exam    Appearance:  Erythema  negative  Swelling  negative  Joint Deformity  negative  Palpation/Tenderness:  Tenderness along the lateral patellar facet    No joint line tenderness present   Reproducible infrapatellar pain with  Passive extension to flexion  Active Range of Motion:  Flexion: No limitation and Extension: No limitation  Special Tests: Max's Test:  negative  Valgus Stress Test:  negative  Varus Stress Test:  negative    Eyes:  Anicteric sclerae  Neck:  Supple  Lungs:  Normal respiratory effort  Cardiovascular:  Capillary refill is less than 2 seconds  Skin:  Intact without erythema  Neurologic:  Sensation grossly intact to light touch  Psychiatric:  Mood and affect are appropriate  Data Review     I have personally reviewed pertinent films in PACS, and my interpretation follows:    Xrays right knee confirm well preserved joint spaces without degenerative changes  Mild patella gurpreet and mild patellar tilt noted  Past Medical History:   Diagnosis Date    Anxiety     Varicella     childhood       Past Surgical History:   Procedure Laterality Date    WISDOM TOOTH EXTRACTION         No Known Allergies    Current Outpatient Medications on File Prior to Visit   Medication Sig Dispense Refill    fluocinonide (LIDEX) 0 05 % ointment Apply topically daily 30 g 0    Multiple Vitamins-Minerals (MULTI-VITAMIN GUMMIES PO) Take by mouth      PRENATAL-DSS-CA-FE CBN-FA-DHA PO Take 1 tablet by mouth daily       No current facility-administered medications on file prior to visit          Social History     Tobacco Use    Smoking status: Former Smoker     Packs/day: 0 50     Years: 10 00     Pack years: 5 00     Types: Cigarettes     Last attempt to quit: 2011     Years since quittin 4    Smokeless tobacco: Never Used   Substance Use Topics    Alcohol use: Not Currently     Frequency: 2-4 times a month     Drinks per session: 1 or 2     Comment: socially     Drug use: Never       Family History   Problem Relation Age of Onset    Depression Mother     Crohn's disease Mother     Cancer Mother         breast    Mental illness Mother         anx    Breast cancer Mother     No Known Problems Father     Asthma Sister     Stroke Maternal Grandmother     Heart disease Maternal Grandfather         failure    Heart failure Maternal Grandfather     Cancer Paternal Grandfather         prostate    Crohn's disease Sister     No Known Problems Son        Review of Systems     As stated in the HPI  All other systems were reviewed and are negative        Scribe Attestation    I,:    am acting as a scribe while in the presence of the attending physician :        I,:    personally performed the services described in this documentation    as scribed in my presence :

## 2020-09-21 ENCOUNTER — ROUTINE PRENATAL (OUTPATIENT)
Dept: OBGYN CLINIC | Facility: CLINIC | Age: 35
End: 2020-09-21

## 2020-09-21 VITALS — DIASTOLIC BLOOD PRESSURE: 78 MMHG | BODY MASS INDEX: 40.71 KG/M2 | SYSTOLIC BLOOD PRESSURE: 134 MMHG | WEIGHT: 248.4 LBS

## 2020-09-21 DIAGNOSIS — Z3A.32 32 WEEKS GESTATION OF PREGNANCY: Primary | ICD-10-CM

## 2020-09-21 DIAGNOSIS — O09.523 MULTIGRAVIDA OF ADVANCED MATERNAL AGE IN THIRD TRIMESTER: ICD-10-CM

## 2020-09-21 DIAGNOSIS — O36.63X0 EXCESSIVE FETAL GROWTH AFFECTING MANAGEMENT OF PREGNANCY IN THIRD TRIMESTER, SINGLE OR UNSPECIFIED FETUS: ICD-10-CM

## 2020-09-21 DIAGNOSIS — O99.213 OBESITY AFFECTING PREGNANCY IN THIRD TRIMESTER: ICD-10-CM

## 2020-09-21 PROCEDURE — PNV: Performed by: OBSTETRICS & GYNECOLOGY

## 2020-09-21 NOTE — PROGRESS NOTES
This is a 28 y o   at [place] [address] who presents for return OB visit  No complaints  Denies contractions, leakage, bleeding  Endorses fetal movement   BP: 134/78 TWlb    Patient plans to breastfeed  Plans for contraception: undecided   Would like tubal ligation if she needs a c/section  Perineal massage info reviewed and provided in AVS  F/up 2 wks

## 2020-09-21 NOTE — PATIENT INSTRUCTIONS
Perineal/Vaginal Massage    Your perineum includes the area at the back of your vagina and vulva and goes to your anus and rectum, and includes the back portion of the birth canal  It is the tissues of your perineum that create a strong pelvic floor, and allow you to walk upright and prevent you from urinating every time you cough  Needless to say, it is important that these tissues are intact and strong, but they also need to be flexible enough to stretch during childbirth to allow the baby to move through the birth canal     Perineal massage during the last several weeks of pregnancy can relax and stretch the tissues of your perineum  This gentle massage keeps the perineal tissues flexible and supple and prepares them to relax and expand naturally during delivery  What can I do now to decrease my chances of tearing during delivery? Massaging around the vaginal opening by you (or your partner), either before birth or during the 2nd stage of labor (massage done by your physician), can reduce the likelihood of perineal tearing during childbirth  Likewise, the use of warm packs held on the perineum during the pushing stage of labor can reduce the severity of your tear  This will happen during the pushing stage of labor  At home, you could also help reduce the chances of injury that may occur during the birth of your child through perineal massage  When should I do this? Starting around or shortly after 34 weeks of pregnancy, your or your partner should provide 5 to 10 minutes of vaginal massage 1 to 4 times per week  What do I use?   Types of lubricant to try:  natural oils, like organic sunflower, grapeseed, coconut, almond, or olive  personal lubricants, like K-Y Jelly, are also a good choice because theyre water soluble  your bodys own vaginal lubricant, if this makes you more comfortable  Whatever you choose, stay away from using synthetic oils or lubricants, like baby oil, mineral oil, or petroleum jelly  How? 1  Find a comfortable position that allows you to reach your perineum, either by reaching your hands in front of you or behind you  For example, a) Sitting propped up in bed with your knees bent, b) squatting against a wall for support with or without the aid of a stack of books or stool, and c) raising one leg such as in the shower or on the toilet  Feel free to use di?erent positions on di?erent days or even change positions during your massage if you become uncomfortable or tired  2  Use either almond, coconut or olive oil and water mixture on 1 or 2 fingers or thumbs, depending on comfort  3  Perineal massage can be done with either or both thumbs, your index or middle ?ngers, or two ?ngers on each hand  4  Insert your thumbs or ?ngers about an inch inside the vagina (up to the first knuckle or just past that), resting your palms against your inside leg  5  Apply sweeping downward inside word pressure from 3 to 9 o'clock for 5 to 10 minutes  Repeat 1 to 4 times a week  6  The goal is to stretch and massage the back portion of the birth canal, down towards the anus and then apart side to side, using more and more pressure over time  Press gently down with your thumbs or ?ngers toward the anus, and then pull them apart from each other and out to the sides  Several times during your massage, hold this stretched position and consciously relax your muscles in this region  Keep massaging down and out to stretch and relax these tissues  Pressure should not be painful, but during the first couple of weeks, it is normal to feel a slight burning or stretching sensation

## 2020-09-22 ENCOUNTER — OFFICE VISIT (OUTPATIENT)
Dept: PHYSICAL THERAPY | Facility: CLINIC | Age: 35
End: 2020-09-22
Payer: COMMERCIAL

## 2020-09-22 DIAGNOSIS — M25.561 ACUTE PAIN OF RIGHT KNEE: Primary | ICD-10-CM

## 2020-09-22 PROCEDURE — 97110 THERAPEUTIC EXERCISES: CPT | Performed by: PHYSICAL THERAPIST

## 2020-09-22 PROCEDURE — 97140 MANUAL THERAPY 1/> REGIONS: CPT | Performed by: PHYSICAL THERAPIST

## 2020-09-22 NOTE — PROGRESS NOTES
Daily Note     Today's date: 2020  Patient name: Claude Curl  : 1985  MRN: [id number]  Referring provider: REED Rangel  Dx:   Encounter Diagnosis     ICD-10-CM    1  Acute pain of right knee  M25 561                   Subjective: has been taping which helps  Ordered to take off work for 4 wks      Objective: See treatment diary below  Did not perform GT due to pt wearing leggings    Assessment: Tolerated treatment well  Patient would benefit from continued PT      Plan: Continue per plan of care        Precautions: pregnancy      Manuals 9/15 9/22           GT             laser  5'                                     Neuro Re-Ed                                                                                                        Ther Ex             HEP IP rev           LAQ w/med glide  30           Leg press  2 pl 3x10           Step up lat  4"2x10           Ham stretch  Man 60"           Quad set w/ball squeeze  30           SLR  3x10                        Ther Activity                                       Gait Training                                       Modalities

## 2020-09-24 ENCOUNTER — OFFICE VISIT (OUTPATIENT)
Dept: PHYSICAL THERAPY | Facility: CLINIC | Age: 35
End: 2020-09-24
Payer: COMMERCIAL

## 2020-09-24 DIAGNOSIS — M25.561 ACUTE PAIN OF RIGHT KNEE: Primary | ICD-10-CM

## 2020-09-24 PROCEDURE — 97140 MANUAL THERAPY 1/> REGIONS: CPT

## 2020-09-24 PROCEDURE — 97110 THERAPEUTIC EXERCISES: CPT

## 2020-09-24 NOTE — PROGRESS NOTES
Daily Note     Today's date: 2020  Patient name: Meggan Delgado  : 1985  MRN: [id number]  Referring provider: REED Cuello  Dx: No diagnosis found  Subjective: "Today I'm having a really good day, I'm actually surprised "      Objective: See treatment diary below      Assessment: Mild discomfort during side step  Ups and LAQ exercises  Rec laser to R knee, med/lat aspects  Added GT to R lat knee, restrictions/discomfort noted during same  Tolerated treatment well  Patient would benefit from continued PT      Plan: Continue per plan of care        Precautions: pregnancy      Manuals 9/15 9/22 9/24          GT   5'          laser  5' 5'                                    Neuro Re-Ed                                                                                                        Ther Ex             HEP IP rev           LAQ w/med glide  30 20          Leg press  2 pl 3x10 2 pl  3x10          Step up lat  4"2x10 4"  2x10          Ham stretch  Man 60" 3x30"          Quad set w/ball squeeze  30 3"x30          SLR  3x10 3x10                       Ther Activity                                       Gait Training                                       Modalities

## 2020-09-29 ENCOUNTER — OFFICE VISIT (OUTPATIENT)
Dept: PHYSICAL THERAPY | Facility: CLINIC | Age: 35
End: 2020-09-29
Payer: COMMERCIAL

## 2020-09-29 DIAGNOSIS — M25.561 ACUTE PAIN OF RIGHT KNEE: Primary | ICD-10-CM

## 2020-09-29 PROCEDURE — 97140 MANUAL THERAPY 1/> REGIONS: CPT | Performed by: PHYSICAL THERAPIST

## 2020-09-29 PROCEDURE — 97110 THERAPEUTIC EXERCISES: CPT | Performed by: PHYSICAL THERAPIST

## 2020-09-29 NOTE — PROGRESS NOTES
Daily Note     Today's date: 2020  Patient name: Tip Ruggiero  : 1985  MRN: [id number]  Referring provider: REED Toney  Dx:   Encounter Diagnosis     ICD-10-CM    1  Acute pain of right knee  M25 561                   Subjective: knee has been feeling much better      Objective: See treatment diary below      Assessment: Tolerated treatment well  Patient demonstrated fatigue post treatment      Plan: Continue per plan of care        Precautions: pregnancy      Manuals 9/15 9/22 9/24 9/29         GT   5' mb         laser  5' 5' 5'         S/l quad stretch    30"x2         Med glide tape    mb         Neuro Re-Ed                                                                                                        Ther Ex             HEP IP rev           LAQ w/med glide  30 20 20         Leg press  2 pl 3x10 2 pl  3x10 2 pl 3x10         Step up lat  4"2x10 4"  2x10 4"         Ham stretch  Man 60" 3x30" 30"         Quad set w/ball squeeze  30 3"x30 3"x30         SLR  3x10 3x10 3x10         squats    nv         Ther Activity                                       Gait Training                                       Modalities

## 2020-10-01 ENCOUNTER — OFFICE VISIT (OUTPATIENT)
Dept: PHYSICAL THERAPY | Facility: CLINIC | Age: 35
End: 2020-10-01
Payer: COMMERCIAL

## 2020-10-01 DIAGNOSIS — M25.561 ACUTE PAIN OF RIGHT KNEE: Primary | ICD-10-CM

## 2020-10-01 PROCEDURE — 97140 MANUAL THERAPY 1/> REGIONS: CPT

## 2020-10-01 PROCEDURE — 97110 THERAPEUTIC EXERCISES: CPT

## 2020-10-05 ENCOUNTER — ROUTINE PRENATAL (OUTPATIENT)
Dept: OBGYN CLINIC | Facility: CLINIC | Age: 35
End: 2020-10-05

## 2020-10-05 ENCOUNTER — EVALUATION (OUTPATIENT)
Dept: PHYSICAL THERAPY | Facility: CLINIC | Age: 35
End: 2020-10-05
Payer: COMMERCIAL

## 2020-10-05 VITALS — BODY MASS INDEX: 41.62 KG/M2 | SYSTOLIC BLOOD PRESSURE: 126 MMHG | DIASTOLIC BLOOD PRESSURE: 74 MMHG | WEIGHT: 254 LBS

## 2020-10-05 DIAGNOSIS — O99.891 STRESS INCONTINENCE IN PREGNANCY: Primary | ICD-10-CM

## 2020-10-05 DIAGNOSIS — O36.63X0 EXCESSIVE FETAL GROWTH AFFECTING MANAGEMENT OF PREGNANCY IN THIRD TRIMESTER, SINGLE OR UNSPECIFIED FETUS: ICD-10-CM

## 2020-10-05 DIAGNOSIS — O99.213 OBESITY AFFECTING PREGNANCY IN THIRD TRIMESTER: ICD-10-CM

## 2020-10-05 DIAGNOSIS — Z34.83 PRENATAL CARE, SUBSEQUENT PREGNANCY IN THIRD TRIMESTER: Primary | ICD-10-CM

## 2020-10-05 DIAGNOSIS — O09.523 MULTIGRAVIDA OF ADVANCED MATERNAL AGE IN THIRD TRIMESTER: ICD-10-CM

## 2020-10-05 DIAGNOSIS — N39.3 STRESS INCONTINENCE IN PREGNANCY: Primary | ICD-10-CM

## 2020-10-05 DIAGNOSIS — Z3A.34 34 WEEKS GESTATION OF PREGNANCY: ICD-10-CM

## 2020-10-05 PROCEDURE — 97112 NEUROMUSCULAR REEDUCATION: CPT | Performed by: PHYSICAL THERAPIST

## 2020-10-05 PROCEDURE — 97162 PT EVAL MOD COMPLEX 30 MIN: CPT | Performed by: PHYSICAL THERAPIST

## 2020-10-05 PROCEDURE — PNV: Performed by: OBSTETRICS & GYNECOLOGY

## 2020-10-06 ENCOUNTER — OFFICE VISIT (OUTPATIENT)
Dept: PHYSICAL THERAPY | Facility: CLINIC | Age: 35
End: 2020-10-06
Payer: COMMERCIAL

## 2020-10-06 DIAGNOSIS — M25.561 ACUTE PAIN OF RIGHT KNEE: Primary | ICD-10-CM

## 2020-10-06 PROCEDURE — 97110 THERAPEUTIC EXERCISES: CPT | Performed by: PHYSICAL THERAPIST

## 2020-10-07 ENCOUNTER — TELEPHONE (OUTPATIENT)
Dept: OBGYN CLINIC | Facility: CLINIC | Age: 35
End: 2020-10-07

## 2020-10-08 ENCOUNTER — OFFICE VISIT (OUTPATIENT)
Dept: PHYSICAL THERAPY | Facility: CLINIC | Age: 35
End: 2020-10-08
Payer: COMMERCIAL

## 2020-10-08 ENCOUNTER — TELEPHONE (OUTPATIENT)
Dept: PERINATAL CARE | Facility: CLINIC | Age: 35
End: 2020-10-08

## 2020-10-08 DIAGNOSIS — M25.561 ACUTE PAIN OF RIGHT KNEE: Primary | ICD-10-CM

## 2020-10-08 PROCEDURE — 97140 MANUAL THERAPY 1/> REGIONS: CPT

## 2020-10-08 PROCEDURE — 97110 THERAPEUTIC EXERCISES: CPT

## 2020-10-13 ENCOUNTER — OFFICE VISIT (OUTPATIENT)
Dept: PHYSICAL THERAPY | Facility: CLINIC | Age: 35
End: 2020-10-13
Payer: COMMERCIAL

## 2020-10-13 DIAGNOSIS — M25.561 ACUTE PAIN OF RIGHT KNEE: Primary | ICD-10-CM

## 2020-10-13 PROCEDURE — 97110 THERAPEUTIC EXERCISES: CPT | Performed by: PHYSICAL THERAPIST

## 2020-10-14 ENCOUNTER — OFFICE VISIT (OUTPATIENT)
Dept: PHYSICAL THERAPY | Facility: CLINIC | Age: 35
End: 2020-10-14
Payer: COMMERCIAL

## 2020-10-14 DIAGNOSIS — N39.3 STRESS INCONTINENCE IN PREGNANCY: Primary | ICD-10-CM

## 2020-10-14 DIAGNOSIS — O99.891 STRESS INCONTINENCE IN PREGNANCY: Primary | ICD-10-CM

## 2020-10-14 PROCEDURE — 97530 THERAPEUTIC ACTIVITIES: CPT | Performed by: PHYSICAL THERAPIST

## 2020-10-14 PROCEDURE — 97140 MANUAL THERAPY 1/> REGIONS: CPT | Performed by: PHYSICAL THERAPIST

## 2020-10-14 PROCEDURE — 97110 THERAPEUTIC EXERCISES: CPT | Performed by: PHYSICAL THERAPIST

## 2020-10-15 ENCOUNTER — OFFICE VISIT (OUTPATIENT)
Dept: PHYSICAL THERAPY | Facility: CLINIC | Age: 35
End: 2020-10-15
Payer: COMMERCIAL

## 2020-10-15 ENCOUNTER — OFFICE VISIT (OUTPATIENT)
Dept: OBGYN CLINIC | Facility: CLINIC | Age: 35
End: 2020-10-15
Payer: COMMERCIAL

## 2020-10-15 VITALS
WEIGHT: 254 LBS | HEIGHT: 66 IN | BODY MASS INDEX: 40.82 KG/M2 | HEART RATE: 108 BPM | SYSTOLIC BLOOD PRESSURE: 117 MMHG | DIASTOLIC BLOOD PRESSURE: 77 MMHG

## 2020-10-15 DIAGNOSIS — M25.561 ACUTE PAIN OF RIGHT KNEE: Primary | ICD-10-CM

## 2020-10-15 DIAGNOSIS — M22.2X1 PATELLOFEMORAL DISORDER OF RIGHT KNEE: Primary | ICD-10-CM

## 2020-10-15 PROCEDURE — 97110 THERAPEUTIC EXERCISES: CPT | Performed by: PHYSICAL THERAPIST

## 2020-10-15 PROCEDURE — 99212 OFFICE O/P EST SF 10 MIN: CPT | Performed by: ORTHOPAEDIC SURGERY

## 2020-10-15 PROCEDURE — 1036F TOBACCO NON-USER: CPT | Performed by: ORTHOPAEDIC SURGERY

## 2020-10-19 ENCOUNTER — OFFICE VISIT (OUTPATIENT)
Dept: PHYSICAL THERAPY | Facility: CLINIC | Age: 35
End: 2020-10-19
Payer: COMMERCIAL

## 2020-10-19 DIAGNOSIS — N39.3 STRESS INCONTINENCE IN PREGNANCY: Primary | ICD-10-CM

## 2020-10-19 DIAGNOSIS — O99.891 STRESS INCONTINENCE IN PREGNANCY: Primary | ICD-10-CM

## 2020-10-19 PROCEDURE — 97140 MANUAL THERAPY 1/> REGIONS: CPT | Performed by: PHYSICAL THERAPIST

## 2020-10-19 PROCEDURE — 97530 THERAPEUTIC ACTIVITIES: CPT | Performed by: PHYSICAL THERAPIST

## 2020-10-19 PROCEDURE — 97110 THERAPEUTIC EXERCISES: CPT | Performed by: PHYSICAL THERAPIST

## 2020-10-20 ENCOUNTER — ROUTINE PRENATAL (OUTPATIENT)
Dept: OBGYN CLINIC | Facility: CLINIC | Age: 35
End: 2020-10-20
Payer: COMMERCIAL

## 2020-10-20 VITALS — DIASTOLIC BLOOD PRESSURE: 78 MMHG | WEIGHT: 260 LBS | SYSTOLIC BLOOD PRESSURE: 128 MMHG | BODY MASS INDEX: 42.61 KG/M2

## 2020-10-20 DIAGNOSIS — Z3A.36 36 WEEKS GESTATION OF PREGNANCY: ICD-10-CM

## 2020-10-20 DIAGNOSIS — Z34.83 ENCOUNTER FOR SUPERVISION OF OTHER NORMAL PREGNANCY IN THIRD TRIMESTER: Primary | ICD-10-CM

## 2020-10-20 PROCEDURE — 59025 FETAL NON-STRESS TEST: CPT | Performed by: OBSTETRICS & GYNECOLOGY

## 2020-10-20 PROCEDURE — PNV: Performed by: OBSTETRICS & GYNECOLOGY

## 2020-10-20 PROCEDURE — 87653 STREP B DNA AMP PROBE: CPT | Performed by: OBSTETRICS & GYNECOLOGY

## 2020-10-21 ENCOUNTER — TELEPHONE (OUTPATIENT)
Dept: PERINATAL CARE | Facility: CLINIC | Age: 35
End: 2020-10-21

## 2020-10-22 ENCOUNTER — ULTRASOUND (OUTPATIENT)
Dept: PERINATAL CARE | Facility: CLINIC | Age: 35
End: 2020-10-22
Payer: COMMERCIAL

## 2020-10-22 ENCOUNTER — ROUTINE PRENATAL (OUTPATIENT)
Dept: PERINATAL CARE | Facility: CLINIC | Age: 35
End: 2020-10-22
Payer: COMMERCIAL

## 2020-10-22 VITALS
SYSTOLIC BLOOD PRESSURE: 118 MMHG | TEMPERATURE: 97.3 F | BODY MASS INDEX: 43.18 KG/M2 | WEIGHT: 259.2 LBS | HEIGHT: 65 IN | HEART RATE: 83 BPM | DIASTOLIC BLOOD PRESSURE: 71 MMHG

## 2020-10-22 DIAGNOSIS — Z36.89 ENCOUNTER FOR ULTRASOUND TO ASSESS FETAL GROWTH: ICD-10-CM

## 2020-10-22 DIAGNOSIS — Z3A.36 36 WEEKS GESTATION OF PREGNANCY: ICD-10-CM

## 2020-10-22 DIAGNOSIS — O99.213 OBESITY AFFECTING PREGNANCY IN THIRD TRIMESTER: ICD-10-CM

## 2020-10-22 DIAGNOSIS — Z3A.36 36 WEEKS GESTATION OF PREGNANCY: Primary | ICD-10-CM

## 2020-10-22 DIAGNOSIS — O36.63X0 EXCESSIVE FETAL GROWTH AFFECTING MANAGEMENT OF PREGNANCY IN THIRD TRIMESTER, SINGLE OR UNSPECIFIED FETUS: Primary | ICD-10-CM

## 2020-10-22 PROCEDURE — NC001 PR NO CHARGE

## 2020-10-22 PROCEDURE — 59025 FETAL NON-STRESS TEST: CPT | Performed by: OBSTETRICS & GYNECOLOGY

## 2020-10-22 PROCEDURE — 76816 OB US FOLLOW-UP PER FETUS: CPT | Performed by: OBSTETRICS & GYNECOLOGY

## 2020-10-22 PROCEDURE — 99212 OFFICE O/P EST SF 10 MIN: CPT | Performed by: OBSTETRICS & GYNECOLOGY

## 2020-10-23 LAB — GP B STREP DNA SPEC QL NAA+PROBE: NORMAL

## 2020-10-26 ENCOUNTER — OFFICE VISIT (OUTPATIENT)
Dept: PHYSICAL THERAPY | Facility: CLINIC | Age: 35
End: 2020-10-26
Payer: COMMERCIAL

## 2020-10-26 DIAGNOSIS — O99.891 STRESS INCONTINENCE IN PREGNANCY: Primary | ICD-10-CM

## 2020-10-26 DIAGNOSIS — N39.3 STRESS INCONTINENCE IN PREGNANCY: Primary | ICD-10-CM

## 2020-10-26 PROCEDURE — 97140 MANUAL THERAPY 1/> REGIONS: CPT | Performed by: PHYSICAL THERAPIST

## 2020-10-26 PROCEDURE — 97530 THERAPEUTIC ACTIVITIES: CPT | Performed by: PHYSICAL THERAPIST

## 2020-10-26 PROCEDURE — 97110 THERAPEUTIC EXERCISES: CPT | Performed by: PHYSICAL THERAPIST

## 2020-10-27 ENCOUNTER — ROUTINE PRENATAL (OUTPATIENT)
Dept: OBGYN CLINIC | Facility: CLINIC | Age: 35
End: 2020-10-27
Payer: COMMERCIAL

## 2020-10-27 VITALS — DIASTOLIC BLOOD PRESSURE: 70 MMHG | SYSTOLIC BLOOD PRESSURE: 120 MMHG | BODY MASS INDEX: 43.27 KG/M2 | WEIGHT: 260 LBS

## 2020-10-27 DIAGNOSIS — O36.63X0 EXCESSIVE FETAL GROWTH AFFECTING MANAGEMENT OF PREGNANCY IN THIRD TRIMESTER, SINGLE OR UNSPECIFIED FETUS: ICD-10-CM

## 2020-10-27 DIAGNOSIS — Z3A.37 37 WEEKS GESTATION OF PREGNANCY: ICD-10-CM

## 2020-10-27 DIAGNOSIS — Z34.83 PRENATAL CARE, SUBSEQUENT PREGNANCY IN THIRD TRIMESTER: Primary | ICD-10-CM

## 2020-10-27 PROCEDURE — 59025 FETAL NON-STRESS TEST: CPT | Performed by: OBSTETRICS & GYNECOLOGY

## 2020-10-27 PROCEDURE — PNV: Performed by: OBSTETRICS & GYNECOLOGY

## 2020-11-03 ENCOUNTER — OFFICE VISIT (OUTPATIENT)
Dept: PHYSICAL THERAPY | Facility: CLINIC | Age: 35
End: 2020-11-03
Payer: COMMERCIAL

## 2020-11-03 DIAGNOSIS — O99.891 STRESS INCONTINENCE IN PREGNANCY: Primary | ICD-10-CM

## 2020-11-03 DIAGNOSIS — N39.3 STRESS INCONTINENCE IN PREGNANCY: Primary | ICD-10-CM

## 2020-11-03 PROCEDURE — 97110 THERAPEUTIC EXERCISES: CPT | Performed by: PHYSICAL THERAPIST

## 2020-11-03 PROCEDURE — 97530 THERAPEUTIC ACTIVITIES: CPT | Performed by: PHYSICAL THERAPIST

## 2020-11-03 PROCEDURE — 97140 MANUAL THERAPY 1/> REGIONS: CPT | Performed by: PHYSICAL THERAPIST

## 2020-11-04 ENCOUNTER — ROUTINE PRENATAL (OUTPATIENT)
Dept: OBGYN CLINIC | Facility: CLINIC | Age: 35
End: 2020-11-04
Payer: COMMERCIAL

## 2020-11-04 VITALS — DIASTOLIC BLOOD PRESSURE: 74 MMHG | WEIGHT: 267 LBS | SYSTOLIC BLOOD PRESSURE: 122 MMHG | BODY MASS INDEX: 44.43 KG/M2

## 2020-11-04 DIAGNOSIS — Z3A.36 36 WEEKS GESTATION OF PREGNANCY: ICD-10-CM

## 2020-11-04 PROCEDURE — 59025 FETAL NON-STRESS TEST: CPT | Performed by: OBSTETRICS & GYNECOLOGY

## 2020-11-04 PROCEDURE — PNV: Performed by: OBSTETRICS & GYNECOLOGY

## 2020-11-10 ENCOUNTER — ROUTINE PRENATAL (OUTPATIENT)
Dept: OBGYN CLINIC | Facility: CLINIC | Age: 35
End: 2020-11-10
Payer: COMMERCIAL

## 2020-11-10 VITALS — DIASTOLIC BLOOD PRESSURE: 74 MMHG | BODY MASS INDEX: 44.6 KG/M2 | SYSTOLIC BLOOD PRESSURE: 112 MMHG | WEIGHT: 268 LBS

## 2020-11-10 DIAGNOSIS — Z34.03 ENCOUNTER FOR SUPERVISION OF NORMAL FIRST PREGNANCY IN THIRD TRIMESTER: Primary | ICD-10-CM

## 2020-11-10 DIAGNOSIS — Z3A.39 39 WEEKS GESTATION OF PREGNANCY: ICD-10-CM

## 2020-11-10 PROCEDURE — 59025 FETAL NON-STRESS TEST: CPT | Performed by: OBSTETRICS & GYNECOLOGY

## 2020-11-10 PROCEDURE — PNV: Performed by: OBSTETRICS & GYNECOLOGY

## 2020-11-12 ENCOUNTER — ANESTHESIA EVENT (INPATIENT)
Dept: ANESTHESIOLOGY | Facility: HOSPITAL | Age: 35
End: 2020-11-12
Payer: COMMERCIAL

## 2020-11-12 ENCOUNTER — ANESTHESIA (INPATIENT)
Dept: ANESTHESIOLOGY | Facility: HOSPITAL | Age: 35
End: 2020-11-12
Payer: COMMERCIAL

## 2020-11-12 ENCOUNTER — HOSPITAL ENCOUNTER (INPATIENT)
Facility: HOSPITAL | Age: 35
LOS: 2 days | Discharge: HOME | End: 2020-11-14
Attending: OBSTETRICS & GYNECOLOGY | Admitting: OBSTETRICS & GYNECOLOGY
Payer: COMMERCIAL

## 2020-11-12 ENCOUNTER — HOSPITAL ENCOUNTER (OUTPATIENT)
Dept: LABOR AND DELIVERY | Facility: HOSPITAL | Age: 35
Discharge: HOME | End: 2020-11-12
Payer: COMMERCIAL

## 2020-11-12 DIAGNOSIS — Z3A.39 39 WEEKS GESTATION OF PREGNANCY: ICD-10-CM

## 2020-11-12 LAB
ABO GROUP BLD: NORMAL
BASE EXCESS BLDCOA CALC-SCNC: -5.4 MMOL/L (ref 3–11)
BASE EXCESS BLDCOV CALC-SCNC: -3.7 MMOL/L (ref 1–9)
BLD GP AB SCN SERPL QL: POSITIVE
BLOOD GROUP ANTIBODIES SERPL: NORMAL
ERYTHROCYTE [DISTWIDTH] IN BLOOD BY AUTOMATED COUNT: 13.3 % (ref 11.6–15.1)
HCO3 BLDCOA-SCNC: 21.2 MMOL/L (ref 17.3–27.3)
HCO3 BLDCOV-SCNC: 21.6 MMOL/L (ref 12.2–28.6)
HCT VFR BLD AUTO: 37.3 % (ref 34.8–46.1)
HGB BLD-MCNC: 12.2 G/DL (ref 11.5–15.4)
MCH RBC QN AUTO: 31 PG (ref 26.8–34.3)
MCHC RBC AUTO-ENTMCNC: 32.7 G/DL (ref 31.4–37.4)
MCV RBC AUTO: 95 FL (ref 82–98)
O2 CT VFR BLDCOA CALC: 13.5 ML/DL
OXYHGB MFR BLDCOA: 60.9 %
OXYHGB MFR BLDCOV: 71.1 %
PCO2 BLDCOA: 45.2 MM[HG] (ref 30–60)
PCO2 BLDCOV: 40.2 MM HG (ref 27–43)
PH BLDCOA: 7.29 [PH] (ref 7.23–7.43)
PH BLDCOV: 7.35 [PH] (ref 7.19–7.49)
PLATELET # BLD AUTO: 225 THOUSANDS/UL (ref 149–390)
PMV BLD AUTO: 11.1 FL (ref 8.9–12.7)
PO2 BLDCOA: 26.8 MM HG (ref 5–25)
PO2 BLDCOV: 30.4 MM HG (ref 15–45)
RBC # BLD AUTO: 3.94 MILLION/UL (ref 3.81–5.12)
RH BLD: POSITIVE
SAO2 % BLDCOV: 16 ML/DL
SPECIMEN EXPIRATION DATE: NORMAL
WBC # BLD AUTO: 8.37 THOUSAND/UL (ref 4.31–10.16)

## 2020-11-12 PROCEDURE — 10907ZC DRAINAGE OF AMNIOTIC FLUID, THERAPEUTIC FROM PRODUCTS OF CONCEPTION, VIA NATURAL OR ARTIFICIAL OPENING: ICD-10-PCS | Performed by: OBSTETRICS & GYNECOLOGY

## 2020-11-12 PROCEDURE — 0KQM0ZZ REPAIR PERINEUM MUSCLE, OPEN APPROACH: ICD-10-PCS | Performed by: OBSTETRICS & GYNECOLOGY

## 2020-11-12 PROCEDURE — 10H07YZ INSERTION OF OTHER DEVICE INTO PRODUCTS OF CONCEPTION, VIA NATURAL OR ARTIFICIAL OPENING: ICD-10-PCS | Performed by: OBSTETRICS & GYNECOLOGY

## 2020-11-12 PROCEDURE — 3E033VJ INTRODUCTION OF OTHER HORMONE INTO PERIPHERAL VEIN, PERCUTANEOUS APPROACH: ICD-10-PCS | Performed by: OBSTETRICS & GYNECOLOGY

## 2020-11-12 PROCEDURE — 85027 COMPLETE CBC AUTOMATED: CPT | Performed by: OBSTETRICS & GYNECOLOGY

## 2020-11-12 PROCEDURE — 86870 RBC ANTIBODY IDENTIFICATION: CPT | Performed by: OBSTETRICS & GYNECOLOGY

## 2020-11-12 PROCEDURE — 86905 BLOOD TYPING RBC ANTIGENS: CPT

## 2020-11-12 PROCEDURE — NC001 PR NO CHARGE: Performed by: OBSTETRICS & GYNECOLOGY

## 2020-11-12 PROCEDURE — 4A1HXCZ MONITORING OF PRODUCTS OF CONCEPTION, CARDIAC RATE, EXTERNAL APPROACH: ICD-10-PCS | Performed by: OBSTETRICS & GYNECOLOGY

## 2020-11-12 PROCEDURE — 86592 SYPHILIS TEST NON-TREP QUAL: CPT | Performed by: OBSTETRICS & GYNECOLOGY

## 2020-11-12 PROCEDURE — 59400 OBSTETRICAL CARE: CPT | Performed by: OBSTETRICS & GYNECOLOGY

## 2020-11-12 PROCEDURE — 82805 BLOOD GASES W/O2 SATURATION: CPT | Performed by: OBSTETRICS & GYNECOLOGY

## 2020-11-12 PROCEDURE — 86850 RBC ANTIBODY SCREEN: CPT | Performed by: OBSTETRICS & GYNECOLOGY

## 2020-11-12 PROCEDURE — 86901 BLOOD TYPING SEROLOGIC RH(D): CPT | Performed by: OBSTETRICS & GYNECOLOGY

## 2020-11-12 PROCEDURE — 86900 BLOOD TYPING SEROLOGIC ABO: CPT | Performed by: OBSTETRICS & GYNECOLOGY

## 2020-11-12 RX ORDER — ACETAMINOPHEN 325 MG/1
650 TABLET ORAL EVERY 6 HOURS PRN
Status: DISCONTINUED | OUTPATIENT
Start: 2020-11-12 | End: 2020-11-14 | Stop reason: HOSPADM

## 2020-11-12 RX ORDER — OXYCODONE HYDROCHLORIDE AND ACETAMINOPHEN 5; 325 MG/1; MG/1
1 TABLET ORAL EVERY 4 HOURS PRN
Status: DISCONTINUED | OUTPATIENT
Start: 2020-11-12 | End: 2020-11-14 | Stop reason: HOSPADM

## 2020-11-12 RX ORDER — LIDOCAINE HYDROCHLORIDE AND EPINEPHRINE 15; 5 MG/ML; UG/ML
INJECTION, SOLUTION EPIDURAL AS NEEDED
Status: DISCONTINUED | OUTPATIENT
Start: 2020-11-12 | End: 2020-11-12

## 2020-11-12 RX ORDER — ONDANSETRON 2 MG/ML
4 INJECTION INTRAMUSCULAR; INTRAVENOUS EVERY 6 HOURS PRN
Status: DISCONTINUED | OUTPATIENT
Start: 2020-11-12 | End: 2020-11-12

## 2020-11-12 RX ORDER — OXYTOCIN/RINGER'S LACTATE 30/500 ML
250 PLASTIC BAG, INJECTION (ML) INTRAVENOUS CONTINUOUS
Status: ACTIVE | OUTPATIENT
Start: 2020-11-12 | End: 2020-11-13

## 2020-11-12 RX ORDER — SODIUM CHLORIDE, SODIUM LACTATE, POTASSIUM CHLORIDE, CALCIUM CHLORIDE 600; 310; 30; 20 MG/100ML; MG/100ML; MG/100ML; MG/100ML
125 INJECTION, SOLUTION INTRAVENOUS CONTINUOUS
Status: DISCONTINUED | OUTPATIENT
Start: 2020-11-12 | End: 2020-11-12

## 2020-11-12 RX ORDER — DOCUSATE SODIUM 100 MG/1
100 CAPSULE, LIQUID FILLED ORAL 2 TIMES DAILY PRN
Status: DISCONTINUED | OUTPATIENT
Start: 2020-11-12 | End: 2020-11-14 | Stop reason: HOSPADM

## 2020-11-12 RX ORDER — CALCIUM CARBONATE 200(500)MG
1000 TABLET,CHEWABLE ORAL DAILY PRN
Status: DISCONTINUED | OUTPATIENT
Start: 2020-11-12 | End: 2020-11-14 | Stop reason: HOSPADM

## 2020-11-12 RX ORDER — DIAPER,BRIEF,INFANT-TODD,DISP
1 EACH MISCELLANEOUS 4 TIMES DAILY PRN
Status: DISCONTINUED | OUTPATIENT
Start: 2020-11-12 | End: 2020-11-14 | Stop reason: HOSPADM

## 2020-11-12 RX ORDER — LIDOCAINE HYDROCHLORIDE AND EPINEPHRINE 15; 5 MG/ML; UG/ML
INJECTION, SOLUTION EPIDURAL
Status: COMPLETED | OUTPATIENT
Start: 2020-11-12 | End: 2020-11-12

## 2020-11-12 RX ORDER — OXYTOCIN/RINGER'S LACTATE 30/500 ML
1-30 PLASTIC BAG, INJECTION (ML) INTRAVENOUS
Status: DISCONTINUED | OUTPATIENT
Start: 2020-11-12 | End: 2020-11-12

## 2020-11-12 RX ORDER — CALCIUM CARBONATE 200(500)MG
500 TABLET,CHEWABLE ORAL DAILY PRN
Status: DISCONTINUED | OUTPATIENT
Start: 2020-11-12 | End: 2020-11-12

## 2020-11-12 RX ORDER — IBUPROFEN 600 MG/1
600 TABLET ORAL EVERY 6 HOURS PRN
Status: DISCONTINUED | OUTPATIENT
Start: 2020-11-12 | End: 2020-11-14 | Stop reason: HOSPADM

## 2020-11-12 RX ORDER — OXYCODONE HYDROCHLORIDE AND ACETAMINOPHEN 5; 325 MG/1; MG/1
2 TABLET ORAL EVERY 4 HOURS PRN
Status: DISCONTINUED | OUTPATIENT
Start: 2020-11-12 | End: 2020-11-14 | Stop reason: HOSPADM

## 2020-11-12 RX ADMIN — CALCIUM CARBONATE (ANTACID) CHEW TAB 500 MG 500 MG: 500 CHEW TAB at 19:05

## 2020-11-12 RX ADMIN — WITCH HAZEL 1 PAD: 500 SOLUTION RECTAL; TOPICAL at 23:45

## 2020-11-12 RX ADMIN — SODIUM CHLORIDE, SODIUM LACTATE, POTASSIUM CHLORIDE, AND CALCIUM CHLORIDE 125 ML/HR: .6; .31; .03; .02 INJECTION, SOLUTION INTRAVENOUS at 10:40

## 2020-11-12 RX ADMIN — ROPIVACAINE HYDROCHLORIDE: 2 INJECTION, SOLUTION EPIDURAL; INFILTRATION at 14:00

## 2020-11-12 RX ADMIN — BENZOCAINE AND LEVOMENTHOL: 200; 5 SPRAY TOPICAL at 23:45

## 2020-11-12 RX ADMIN — ROPIVACAINE HYDROCHLORIDE: 2 INJECTION, SOLUTION EPIDURAL; INFILTRATION at 20:57

## 2020-11-12 RX ADMIN — SODIUM CHLORIDE, SODIUM LACTATE, POTASSIUM CHLORIDE, AND CALCIUM CHLORIDE 125 ML/HR: .6; .31; .03; .02 INJECTION, SOLUTION INTRAVENOUS at 09:20

## 2020-11-12 RX ADMIN — SODIUM CHLORIDE, SODIUM LACTATE, POTASSIUM CHLORIDE, AND CALCIUM CHLORIDE 125 ML/HR: .6; .31; .03; .02 INJECTION, SOLUTION INTRAVENOUS at 18:31

## 2020-11-12 RX ADMIN — Medication 2 MILLI-UNITS/MIN: at 09:41

## 2020-11-12 RX ADMIN — LIDOCAINE HYDROCHLORIDE AND EPINEPHRINE 5 ML: 15; 5 INJECTION, SOLUTION EPIDURAL at 13:50

## 2020-11-12 RX ADMIN — HYDROCORTISONE 1 APPLICATION: 1 CREAM TOPICAL at 23:45

## 2020-11-12 RX ADMIN — ONDANSETRON 4 MG: 2 INJECTION INTRAMUSCULAR; INTRAVENOUS at 16:28

## 2020-11-12 RX ADMIN — SODIUM CHLORIDE, SODIUM LACTATE, POTASSIUM CHLORIDE, AND CALCIUM CHLORIDE 125 ML/HR: .6; .31; .03; .02 INJECTION, SOLUTION INTRAVENOUS at 16:05

## 2020-11-13 LAB
M AG RBC QL: NEGATIVE
RPR SER QL: NORMAL

## 2020-11-13 PROCEDURE — 99024 POSTOP FOLLOW-UP VISIT: CPT | Performed by: OBSTETRICS & GYNECOLOGY

## 2020-11-13 RX ADMIN — IBUPROFEN 600 MG: 600 TABLET ORAL at 07:58

## 2020-11-13 RX ADMIN — IBUPROFEN 600 MG: 600 TABLET ORAL at 21:01

## 2020-11-13 RX ADMIN — IBUPROFEN 600 MG: 600 TABLET ORAL at 01:45

## 2020-11-13 RX ADMIN — IBUPROFEN 600 MG: 600 TABLET ORAL at 14:20

## 2020-11-13 RX ADMIN — DOCUSATE SODIUM 100 MG: 100 CAPSULE, LIQUID FILLED ORAL at 07:58

## 2020-11-14 VITALS
BODY MASS INDEX: 44.65 KG/M2 | DIASTOLIC BLOOD PRESSURE: 73 MMHG | OXYGEN SATURATION: 97 % | SYSTOLIC BLOOD PRESSURE: 129 MMHG | WEIGHT: 268 LBS | HEART RATE: 78 BPM | RESPIRATION RATE: 18 BRPM | TEMPERATURE: 99 F | HEIGHT: 65 IN

## 2020-11-14 PROCEDURE — 99024 POSTOP FOLLOW-UP VISIT: CPT | Performed by: OBSTETRICS & GYNECOLOGY

## 2020-11-14 RX ORDER — ACETAMINOPHEN 325 MG/1
650 TABLET ORAL EVERY 6 HOURS PRN
Start: 2020-11-14

## 2020-11-14 RX ORDER — IBUPROFEN 200 MG
600 TABLET ORAL EVERY 6 HOURS PRN
Start: 2020-11-14

## 2020-11-14 RX ADMIN — WITCH HAZEL 1 PAD: 500 SOLUTION RECTAL; TOPICAL at 07:52

## 2020-11-14 RX ADMIN — HYDROCORTISONE 1 APPLICATION: 1 CREAM TOPICAL at 07:52

## 2020-11-14 RX ADMIN — IBUPROFEN 600 MG: 600 TABLET ORAL at 06:27

## 2020-11-16 ENCOUNTER — TRANSITIONAL CARE MANAGEMENT (OUTPATIENT)
Dept: FAMILY MEDICINE CLINIC | Facility: CLINIC | Age: 35
End: 2020-11-16

## 2020-11-19 LAB — PLACENTA IN STORAGE: NORMAL

## 2020-12-08 ENCOUNTER — POSTPARTUM VISIT (OUTPATIENT)
Dept: OBGYN CLINIC | Facility: CLINIC | Age: 35
End: 2020-12-08

## 2020-12-08 VITALS — DIASTOLIC BLOOD PRESSURE: 76 MMHG | WEIGHT: 248 LBS | SYSTOLIC BLOOD PRESSURE: 130 MMHG | BODY MASS INDEX: 41.27 KG/M2

## 2020-12-08 PROBLEM — O99.210 OBESITY AFFECTING PREGNANCY: Status: RESOLVED | Noted: 2020-04-23 | Resolved: 2020-12-08

## 2020-12-08 PROBLEM — Z3A.39 39 WEEKS GESTATION OF PREGNANCY: Status: RESOLVED | Noted: 2020-04-23 | Resolved: 2020-12-08

## 2020-12-08 PROBLEM — Z34.90 SUPERVISION OF NORMAL PREGNANCY: Status: RESOLVED | Noted: 2020-04-16 | Resolved: 2020-12-08

## 2020-12-08 PROBLEM — O36.60X0 EXCESSIVE FETAL GROWTH AFFECTING MANAGEMENT OF MOTHER: Status: RESOLVED | Noted: 2020-09-10 | Resolved: 2020-12-08

## 2020-12-08 PROBLEM — O09.529 ADVANCED MATERNAL AGE IN MULTIGRAVIDA: Status: RESOLVED | Noted: 2020-04-23 | Resolved: 2020-12-08

## 2020-12-08 PROCEDURE — 99024 POSTOP FOLLOW-UP VISIT: CPT | Performed by: OBSTETRICS & GYNECOLOGY

## 2021-03-10 ENCOUNTER — ANNUAL EXAM (OUTPATIENT)
Dept: OBGYN CLINIC | Facility: CLINIC | Age: 36
End: 2021-03-10
Payer: COMMERCIAL

## 2021-03-10 VITALS
SYSTOLIC BLOOD PRESSURE: 120 MMHG | HEIGHT: 65 IN | BODY MASS INDEX: 39.32 KG/M2 | WEIGHT: 236 LBS | DIASTOLIC BLOOD PRESSURE: 72 MMHG

## 2021-03-10 DIAGNOSIS — Z12.4 SCREENING FOR MALIGNANT NEOPLASM OF CERVIX: ICD-10-CM

## 2021-03-10 DIAGNOSIS — Z01.419 WOMEN'S ANNUAL ROUTINE GYNECOLOGICAL EXAMINATION: Primary | ICD-10-CM

## 2021-03-10 DIAGNOSIS — Z11.51 SCREENING FOR HPV (HUMAN PAPILLOMAVIRUS): ICD-10-CM

## 2021-03-10 PROCEDURE — G0145 SCR C/V CYTO,THINLAYER,RESCR: HCPCS | Performed by: OBSTETRICS & GYNECOLOGY

## 2021-03-10 PROCEDURE — 87624 HPV HI-RISK TYP POOLED RSLT: CPT | Performed by: OBSTETRICS & GYNECOLOGY

## 2021-03-10 PROCEDURE — S0612 ANNUAL GYNECOLOGICAL EXAMINA: HCPCS | Performed by: OBSTETRICS & GYNECOLOGY

## 2021-03-11 NOTE — PROGRESS NOTES
ASSESSMENT & PLAN: Erika Torres is a 39 y o  [id number] with normal gynecologic exam     1   Routine well woman exam done today  2    Pap and HPV:Pap with HPV was done today  Current ASCCP Guidelines reviewed  Last Pap  2018 :  no abnormalities  3   The patient declined STD testing  Safe sex practices have been discussed  4  The patient is sexually active  She uses condoms for contraception and options have been discussed  5  The following were reviewed in today's visit: breast self exam, STD testing, family planning choices, exercise and healthy diet  6  Patient to return to office in 12 months for annual      All questions have been answered to her satisfaction  CC:  Annual Gynecologic Examination    HPI: Erika Torres is a 39 y o  [id number] who presents for annual gynecologic examination  She has the following concerns:  none    Health Maintenance:    She exercises 1 days per week  She wears her seatbelt routinely  She does perform irregular monthly self breast exams  She feels safe at home  Patients does follow a reg diet  Past Medical History:   Diagnosis Date    Anxiety     Varicella     childhood       Past Surgical History:   Procedure Laterality Date    WISDOM TOOTH EXTRACTION         Past OB/Gyn History:   No LMP recorded  Menstrual History:  OB History        2    Para   2    Term   2            AB   0    Living   2       SAB        TAB        Ectopic        Multiple   0    Live Births   2           Obstetric Comments   Menarche 8               History of sexually transmitted infection No  Patient is currently sexually active  heterosexual Birth control: condoms  Last Pap  2018 :  no abnormalities      Family History   Problem Relation Age of Onset    Depression Mother     Crohn's disease Mother     Cancer Mother         breast    Mental illness Mother         anx    Breast cancer Mother     No Known Problems Father     Asthma Sister     Stroke Maternal Grandmother     Heart disease Maternal Grandfather         failure    Heart failure Maternal Grandfather     Cancer Paternal Grandfather         prostate    Crohn's disease Sister     No Known Problems Son     No Known Problems Son        Social History:  Social History     Socioeconomic History    Marital status: Unknown     Spouse name: Noy Alarcon    Number of children: Not on file    Years of education: Associate    Highest education level: Not on file   Occupational History    Occupation: RN   Social Needs    Financial resource strain: Not on file    Food insecurity     Worry: Not on file     Inability: Not on file   [employer] needs     Medical: Not on file     Non-medical: Not on file   Tobacco Use    Smoking status: Former Smoker     Packs/day: 0 50     Years: 10 00     Pack years: 5 00     Types: Cigarettes     Quit date: 2011     Years since quittin 9    Smokeless tobacco: Never Used   Substance and Sexual Activity    Alcohol use: Not Currently    Drug use: Never    Sexual activity: Not Currently     Partners: Male     Birth control/protection: None   Lifestyle    Physical activity     Days per week: Not on file     Minutes per session: Not on file    Stress: Not on file   Relationships    Social connections     Talks on phone: Not on file     Gets together: Not on file     Attends Nondenominational service: Not on file     Active member of club or organization: Not on file     Attends meetings of clubs or organizations: Not on file     Relationship status: Not on file    Intimate partner violence     Fear of current or ex partner: Not on file     Emotionally abused: Not on file     Physically abused: Not on file     Forced sexual activity: Not on file   Other Topics Concern    Not on file   Social History Narrative    Not on file     Presently lives with her family  Patient is     Patient is currently employed - [employer] nurse  No Known Allergies    Current Outpatient Medications:     acetaminophen (TYLENOL) 325 mg tablet, Take 2 tablets (650 mg total) by mouth every 6 (six) hours as needed for headaches, Disp:  , Rfl:     ibuprofen (MOTRIN) 200 mg tablet, Take 3 tablets (600 mg total) by mouth every 6 (six) hours as needed for mild pain, Disp: , Rfl:     Multiple Vitamins-Minerals (MULTI-VITAMIN GUMMIES PO), Take by mouth, Disp: , Rfl:     Review of Systems:  Review of Systems   Constitutional: Negative  HENT: Negative  Respiratory: Negative  Cardiovascular: Negative  Gastrointestinal: Negative  Psychiatric/Behavioral: Negative  Physical Exam:  /72   Ht 5' 5" (1 651 m)   Wt 107 kg (236 lb)   BMI 39 27 kg/m²    Physical Exam  Constitutional:       Appearance: Normal appearance  She is normal weight  Genitourinary:      Vulva, urethra, bladder, vagina, cervix, uterus, right adnexa and left adnexa normal       No vulval tenderness or Bartholin's cyst noted  No signs of labial injury  Vaginal rugosity present  No vaginal discharge, tenderness or bleeding  Cervix is parous  Cervical pinkness present  No cervical polyp  Uterus is mobile  Uterus is not enlarged or tender  HENT:      Head: Normocephalic and atraumatic  Eyes:      Extraocular Movements: Extraocular movements intact  Conjunctiva/sclera: Conjunctivae normal       Pupils: Pupils are equal, round, and reactive to light  Cardiovascular:      Rate and Rhythm: Normal rate and regular rhythm  Heart sounds: Normal heart sounds  No murmur  Pulmonary:      Effort: Pulmonary effort is normal  No respiratory distress  Breath sounds: Normal breath sounds  No wheezing or rales  Chest:      Breasts:         Right: Normal          Left: Normal    Abdominal:      General: Abdomen is flat  There is no distension  Palpations: Abdomen is soft  Tenderness: There is no abdominal tenderness  There is no guarding     Neurological: General: No focal deficit present  Mental Status: She is alert and oriented to person, place, and time  Skin:     General: Skin is warm and dry  Vitals signs and nursing note reviewed

## 2021-03-15 NOTE — RESULT ENCOUNTER NOTE
Abbe Hinson,     Your HPV testing is negative  Your pap is still pending, and will be updated soon  Please contact the office at [phone] with any questions       Madelyn

## 2021-03-16 LAB
LAB AP GYN PRIMARY INTERPRETATION: NORMAL
Lab: NORMAL

## 2021-04-29 ENCOUNTER — IMMUNIZATIONS (OUTPATIENT)
Dept: FAMILY MEDICINE CLINIC | Facility: HOSPITAL | Age: 36
End: 2021-04-29

## 2021-04-29 DIAGNOSIS — Z23 ENCOUNTER FOR IMMUNIZATION: Primary | ICD-10-CM

## 2021-04-29 PROCEDURE — 91300 SARS-COV-2 / COVID-19 MRNA VACCINE (PFIZER-BIONTECH) 30 MCG: CPT

## 2021-04-29 PROCEDURE — 0001A SARS-COV-2 / COVID-19 MRNA VACCINE (PFIZER-BIONTECH) 30 MCG: CPT

## 2021-05-22 ENCOUNTER — IMMUNIZATIONS (OUTPATIENT)
Dept: FAMILY MEDICINE CLINIC | Facility: HOSPITAL | Age: 36
End: 2021-05-22

## 2021-05-22 DIAGNOSIS — Z23 ENCOUNTER FOR IMMUNIZATION: Primary | ICD-10-CM

## 2021-05-22 PROCEDURE — 91300 SARS-COV-2 / COVID-19 MRNA VACCINE (PFIZER-BIONTECH) 30 MCG: CPT

## 2021-05-22 PROCEDURE — 0002A SARS-COV-2 / COVID-19 MRNA VACCINE (PFIZER-BIONTECH) 30 MCG: CPT

## 2022-05-04 ENCOUNTER — OFFICE VISIT (OUTPATIENT)
Dept: FAMILY MEDICINE CLINIC | Facility: CLINIC | Age: 37
End: 2022-05-04
Payer: COMMERCIAL

## 2022-05-04 VITALS
RESPIRATION RATE: 16 BRPM | HEIGHT: 64 IN | OXYGEN SATURATION: 98 % | HEART RATE: 88 BPM | BODY MASS INDEX: 39.27 KG/M2 | SYSTOLIC BLOOD PRESSURE: 120 MMHG | WEIGHT: 230 LBS | DIASTOLIC BLOOD PRESSURE: 80 MMHG | TEMPERATURE: 97.8 F

## 2022-05-04 DIAGNOSIS — Z00.00 PHYSICAL EXAM: Primary | ICD-10-CM

## 2022-05-04 DIAGNOSIS — Z13.220 LIPID SCREENING: ICD-10-CM

## 2022-05-04 DIAGNOSIS — R53.83 FATIGUE, UNSPECIFIED TYPE: ICD-10-CM

## 2022-05-04 PROBLEM — E66.9 CLASS 2 OBESITY WITHOUT SERIOUS COMORBIDITY WITH BODY MASS INDEX (BMI) OF 39.0 TO 39.9 IN ADULT: Status: ACTIVE | Noted: 2022-05-04

## 2022-05-04 PROBLEM — E66.812 CLASS 2 OBESITY WITHOUT SERIOUS COMORBIDITY WITH BODY MASS INDEX (BMI) OF 39.0 TO 39.9 IN ADULT: Status: ACTIVE | Noted: 2022-05-04

## 2022-05-04 PROCEDURE — 3008F BODY MASS INDEX DOCD: CPT | Performed by: NURSE PRACTITIONER

## 2022-05-04 PROCEDURE — 1036F TOBACCO NON-USER: CPT | Performed by: NURSE PRACTITIONER

## 2022-05-04 PROCEDURE — 3725F SCREEN DEPRESSION PERFORMED: CPT | Performed by: NURSE PRACTITIONER

## 2022-05-04 PROCEDURE — 99395 PREV VISIT EST AGE 18-39: CPT | Performed by: NURSE PRACTITIONER

## 2022-05-04 NOTE — PROGRESS NOTES
FAMILY PRACTICE HEALTH MAINTENANCE OFFICE VISIT  [facility]    NAME: Don Chew  AGE: 40 y o  SEX: female  : 1985     DATE: 2022    Assessment and Plan     1  Physical exam    2  Fatigue, unspecified type  -     CBC; Future  -     Comprehensive metabolic panel; Future  -     TSH, 3rd generation; Future  -     CBC  -     Comprehensive metabolic panel  -     TSH, 3rd generation    3  Lipid screening  -     Lipid panel; Future  -     Lipid panel        · Patient Counseling:   · Nutrition: Stressed importance of a well balanced diet, moderation of sodium/saturated fat, caloric balance and sufficient intake of fiber  · Exercise: Stressed the importance of regular exercise with a goal of 150 minutes per week  · Dental Health: Discussed daily flossing and brushing and regular dental visits     · Immunizations reviewed: Up To Date COVID-19 primary vaccine series, declines booster  · Discussed benefits of:  Cervical Cancer screening and Screening labs   BMI Counseling: Body mass index is 39 12 kg/m²  Discussed with patient's BMI with her  The BMI is above normal  Nutrition recommendations include 3-5 servings of fruits/vegetables daily, moderation in carbohydrate intake and increasing intake of lean protein  Exercise recommendations include moderate aerobic physical activity for 150 minutes/week and strength training exercises  Walks, jogs or lifts weights every day  No cookies, cake, candy in the house  Sometimes eats when stressed  Walking 12,000 steps per day  Due for blood work  Discussed noom, as a possibility to combine mindfulness regarding eating with diet and exercise coaching that she could do from home   can be challenging  Also discussed apps like lose it, my fitness pal     Follow up in 1 year or sooner if needed         Chief Complaint     Chief Complaint   Patient presents with    Well Check       History of Present Illness Pennie Roe is a 40year old female presenting today for annual exam      Sleeping well  Tired, but mom of 2 toddlers  Working on weight loss, but struggling to lose weight  Walks, runs or lifts weights daily  Well Adult Physical   Patient here for a comprehensive physical exam       Diet and Physical Activity  Diet: well balanced diet  Exercise: frequently      Depression Screen  PHQ-2/9 Depression Screening    Little interest or pleasure in doing things: 0 - not at all  Feeling down, depressed, or hopeless: 0 - not at all  PHQ-2 Score: 0  PHQ-2 Interpretation: Negative depression screen          General Health  Hearing: Normal:  bilateral  Vision: no vision problems  Dental: regular dental visits Due for dental exam, March visit was canceled by her dental office, next scheduled appointment not til October  Reproductive Health  No issues    Has gyn, Dr Jackie Ludwig  Due for annual exam  Advised to schedule  The following portions of the patient's history were reviewed and updated as appropriate: allergies, current medications, past family history, past medical history, past social history, past surgical history and problem list     Review of Systems     Review of Systems   Constitutional: Positive for fatigue  HENT: Negative  Respiratory: Negative  Cardiovascular: Negative  Gastrointestinal: Negative  Genitourinary: Negative  Musculoskeletal: Negative  Skin: Negative  Neurological: Negative  Hematological: Negative  Psychiatric/Behavioral: Negative          Past Medical History     Past Medical History:   Diagnosis Date    Varicella     childhood       Past Surgical History     Past Surgical History:   Procedure Laterality Date    WISDOM TOOTH EXTRACTION         Social History     Social History     Socioeconomic History    Marital status: Unknown     Spouse name: Yuridia Brown    Number of children: None    Years of education: Associate    Highest education level: None   Occupational History    Occupation: RN   Tobacco Use    Smoking status: Former Smoker     Packs/day: 0 50     Years: 10 00     Pack years: 5 00     Types: Cigarettes     Quit date: 2011     Years since quittin 0    Smokeless tobacco: Never Used   Vaping Use    Vaping Use: Former   Substance and Sexual Activity    Alcohol use: Not Currently    Drug use: Never    Sexual activity: Not Currently     Partners: Male     Birth control/protection: None   Other Topics Concern    None   Social History Narrative    None     Social Determinants of Health     Financial Resource Strain: Not on file   Food Insecurity: Not on file   Transportation Needs: Not on file   Physical Activity: Not on file   Stress: Not on file   Social Connections: Not on file   Intimate Partner Violence: Not on file   Housing Stability: Not on file       Family History     Family History   Problem Relation Age of Onset    Depression Mother     Crohn's disease Mother     Cancer Mother         breast    Mental illness Mother         anx    Breast cancer Mother     No Known Problems Father     Asthma Sister     Stroke Maternal Grandmother     Heart disease Maternal Grandfather         failure    Heart failure Maternal Grandfather     Cancer Paternal Grandfather         prostate    Crohn's disease Sister     No Known Problems Son     No Known Problems Son        Current Medications       Current Outpatient Medications:     acetaminophen (TYLENOL) 325 mg tablet, Take 2 tablets (650 mg total) by mouth every 6 (six) hours as needed for headaches, Disp:  , Rfl:     ibuprofen (MOTRIN) 200 mg tablet, Take 3 tablets (600 mg total) by mouth every 6 (six) hours as needed for mild pain, Disp: , Rfl:     Multiple Vitamins-Minerals (MULTI-VITAMIN GUMMIES PO), Take by mouth, Disp: , Rfl:      Allergies     No Known Allergies    Objective     /80   Pulse 88   Temp 97 8 °F (36 6 °C) (Temporal) Resp 16    5' 4 29" (1 633 m)   Wt 104 kg (230 lb)   LMP 04/24/2022 (Exact Date)   SpO2 98%   BMI 39 12 kg/m²      Physical Exam  Vitals and nursing note reviewed  Constitutional:       General: She is not in acute distress  Appearance: Normal appearance  She is well-developed  She is not ill-appearing  HENT:      Head: Normocephalic and atraumatic  Right Ear: Tympanic membrane and ear canal normal       Left Ear: Tympanic membrane and ear canal normal       Mouth/Throat:      Mouth: Mucous membranes are moist       Pharynx: Oropharynx is clear  Eyes:      Conjunctiva/sclera: Conjunctivae normal       Pupils: Pupils are equal, round, and reactive to light  Neck:      Thyroid: No thyromegaly  Cardiovascular:      Rate and Rhythm: Normal rate and regular rhythm  Heart sounds: No murmur heard  Pulmonary:      Effort: Pulmonary effort is normal       Breath sounds: Normal breath sounds  Abdominal:      General: Bowel sounds are normal       Palpations: Abdomen is soft  Tenderness: There is no abdominal tenderness  Musculoskeletal:         General: No deformity  Cervical back: Normal range of motion and neck supple  Right lower leg: No edema  Left lower leg: No edema  Lymphadenopathy:      Cervical: No cervical adenopathy  Skin:     Findings: No rash  Neurological:      Mental Status: She is alert and oriented to person, place, and time        Gait: Gait normal    Psychiatric:         Mood and Affect: Mood normal            REED Santos  [address] [facility]

## 2022-05-08 LAB
ALBUMIN SERPL-MCNC: 4.4 G/DL (ref 3.6–5.1)
ALBUMIN/GLOB SERPL: 1.7 (CALC) (ref 1–2.5)
ALP SERPL-CCNC: 53 U/L (ref 31–125)
ALT SERPL-CCNC: 13 U/L (ref 6–29)
AST SERPL-CCNC: 15 U/L (ref 10–30)
BILIRUB SERPL-MCNC: 0.4 MG/DL (ref 0.2–1.2)
BUN SERPL-MCNC: 15 MG/DL (ref 7–25)
BUN/CREAT SERPL: NORMAL (CALC) (ref 6–22)
CALCIUM SERPL-MCNC: 9.3 MG/DL (ref 8.6–10.2)
CHLORIDE SERPL-SCNC: 102 MMOL/L (ref 98–110)
CHOLEST SERPL-MCNC: 154 MG/DL
CHOLEST/HDLC SERPL: 2.8 (CALC)
CO2 SERPL-SCNC: 28 MMOL/L (ref 20–32)
CREAT SERPL-MCNC: 0.75 MG/DL (ref 0.5–1.1)
ERYTHROCYTE [DISTWIDTH] IN BLOOD BY AUTOMATED COUNT: 11.8 % (ref 11–15)
GLOBULIN SER CALC-MCNC: 2.6 G/DL (CALC) (ref 1.9–3.7)
GLUCOSE SERPL-MCNC: 93 MG/DL (ref 65–99)
HCT VFR BLD AUTO: 41.3 % (ref 35–45)
HDLC SERPL-MCNC: 55 MG/DL
HGB BLD-MCNC: 14 G/DL (ref 11.7–15.5)
LDLC SERPL CALC-MCNC: 86 MG/DL (CALC)
MCH RBC QN AUTO: 31.5 PG (ref 27–33)
MCHC RBC AUTO-ENTMCNC: 33.9 G/DL (ref 32–36)
MCV RBC AUTO: 92.8 FL (ref 80–100)
NONHDLC SERPL-MCNC: 99 MG/DL (CALC)
PLATELET # BLD AUTO: 268 THOUSAND/UL (ref 140–400)
PMV BLD REES-ECKER: 11 FL (ref 7.5–12.5)
POTASSIUM SERPL-SCNC: 4 MMOL/L (ref 3.5–5.3)
PROT SERPL-MCNC: 7 G/DL (ref 6.1–8.1)
RBC # BLD AUTO: 4.45 MILLION/UL (ref 3.8–5.1)
SL AMB EGFR AFRICAN AMERICAN: 118 ML/MIN/1.73M2
SL AMB EGFR NON AFRICAN AMERICAN: 102 ML/MIN/1.73M2
SODIUM SERPL-SCNC: 136 MMOL/L (ref 135–146)
TRIGL SERPL-MCNC: 58 MG/DL
TSH SERPL-ACNC: 2.08 MIU/L
WBC # BLD AUTO: 4.9 THOUSAND/UL (ref 3.8–10.8)

## 2022-05-09 ENCOUNTER — TELEPHONE (OUTPATIENT)
Dept: FAMILY MEDICINE CLINIC | Facility: CLINIC | Age: 37
End: 2022-05-09

## 2022-05-09 NOTE — TELEPHONE ENCOUNTER
----- Message from [id number] sent at 5/9/2022  1:07 PM EDT -----  All blood work is good

## 2023-01-18 ENCOUNTER — OFFICE VISIT (OUTPATIENT)
Dept: OBGYN CLINIC | Facility: CLINIC | Age: 38
End: 2023-01-18

## 2023-01-18 VITALS
HEIGHT: 64 IN | WEIGHT: 209.6 LBS | DIASTOLIC BLOOD PRESSURE: 76 MMHG | SYSTOLIC BLOOD PRESSURE: 118 MMHG | BODY MASS INDEX: 35.78 KG/M2

## 2023-01-18 DIAGNOSIS — Z01.419 ENCOUNTER FOR GYNECOLOGICAL EXAMINATION (GENERAL) (ROUTINE) WITHOUT ABNORMAL FINDINGS: Primary | ICD-10-CM

## 2023-01-18 DIAGNOSIS — Z30.09 STERILIZATION CONSULT: ICD-10-CM

## 2023-01-18 NOTE — PROGRESS NOTES
ASSESSMENT & PLAN: Rachana Chaparro is a 40 y o  [id number] with normal gynecologic exam     1   Routine well woman exam done today  2    Pap and HPV:Pap with HPV was not done today  Current ASCCP Guidelines reviewed  Last Pap  [unfilled] :  no abnormalities  3   The patient declined STD testing  4  The patient is sexually active  She declined contraception and options have been discussed  5  The following were reviewed in today's visit: breast self exam and family planning choices  6  Patient to return to office in 12 months for WWE  7   Interested in sterilization  Reviewed laparoscopic salpingectomy as well as LARCs (mirena IUD)  Discussed risks/ benefits of each and provided with written material   Encouraged to call with questions/ concerns or decision  All questions have been answered to her satisfaction  CC:  Annual Gynecologic Examination    HPI: Rachana Chaparro is a 40 y o  [id number] who presents for annual gynecologic examination  She has the following concerns:  Questions about sterilization  Health Maintenance:    She wears her seatbelt routinely  She does perform irregular monthly self breast exams  She feels safe at home  Past Medical History:   Diagnosis Date   • Varicella     childhood       Past Surgical History:   Procedure Laterality Date   • WISDOM TOOTH EXTRACTION         Past OB/Gyn History:  Period Cycle (Days): 25  Period Duration (Days): 5  Period Pattern: Regular  Menstrual Flow: Heavy  Dysmenorrhea: NonePatient's last menstrual period was 2023  Menstrual History:  OB History        2    Para   2    Term   2            AB   0    Living   2       SAB        IAB        Ectopic        Multiple   0    Live Births   2           Obstetric Comments   Menarche 8             Patient's last menstrual period was 2023    Period Cycle (Days): 25  Period Duration (Days): 5  Period Pattern: Regular  Menstrual Flow: Heavy  Dysmenorrhea: None    History of sexually transmitted infection No  Patient is currently sexually active  heterosexual Birth control: none  Last Pap  [unfilled] :  no abnormalities  Family History   Problem Relation Age of Onset   • Depression Mother    • Crohn's disease Mother    • Cancer Mother         breast   • Mental illness Mother         anx   • Breast cancer Mother    • No Known Problems Father    • Asthma Sister    • Stroke Maternal Grandmother    • Heart disease Maternal Grandfather         failure   • Heart failure Maternal Grandfather    • Cancer Paternal Grandfather         prostate   • Crohn's disease Sister    • No Known Problems Son    • No Known Problems Son        Social History:  Social History     Socioeconomic History   • Marital status: Unknown     Spouse name: Karen Tay   • Number of children: Not on file   • Years of education: Associate   • Highest education level: Not on file   Occupational History   • Occupation: RN   Tobacco Use   • Smoking status: Former     Packs/day: 0 50     Years: 10 00     Pack years: 5 00     Types: Cigarettes     Quit date: 2011     Years since quittin 8   • Smokeless tobacco: Never   Vaping Use   • Vaping Use: Former   Substance and Sexual Activity   • Alcohol use: Not Currently   • Drug use: Never   • Sexual activity: Yes     Partners: Male     Birth control/protection: None   Other Topics Concern   • Not on file   Social History Narrative   • Not on file     Social Determinants of Health     Financial Resource Strain: Not on file   Food Insecurity: Not on file   Transportation Needs: Not on file   Physical Activity: Not on file   Stress: Not on file   Social Connections: Not on file   Intimate Partner Violence: Not on file   Housing Stability: Not on file     Presently lives with spouse and children age 3 and 1  Patient is     Patient is currently unemployed RN  No Known Allergies    Current Outpatient Medications:   •  acetaminophen (TYLENOL) 325 mg tablet, Take 2 tablets (650 mg total) by mouth every 6 (six) hours as needed for headaches, Disp:  , Rfl:   •  ibuprofen (MOTRIN) 200 mg tablet, Take 3 tablets (600 mg total) by mouth every 6 (six) hours as needed for mild pain, Disp: , Rfl:   •  Multiple Vitamins-Minerals (MULTI-VITAMIN GUMMIES PO), Take by mouth, Disp: , Rfl:     Review of Systems:  Review of Systems   Constitutional: Negative for activity change, chills, fever and unexpected weight change  HENT: Negative for congestion, ear pain, hearing loss and sore throat  Respiratory: Negative for cough, chest tightness and shortness of breath  Cardiovascular: Negative for chest pain and leg swelling  Gastrointestinal: Negative for abdominal pain, constipation, diarrhea, nausea and vomiting  Genitourinary: Negative for difficulty urinating, dysuria, frequency, menstrual problem, pelvic pain, vaginal discharge and vaginal pain  Skin: Negative for color change and rash  Neurological: Negative for dizziness, numbness and headaches  Psychiatric/Behavioral: Negative for agitation and confusion  Physical Exam:  /76 (BP Location: Right arm, Patient Position: Sitting, Cuff Size: Standard)   Ht 5' 4 29" (1 633 m)   Wt 95 1 kg (209 lb 9 6 oz)   LMP 01/01/2023   BMI 35 65 kg/m²    Physical Exam  Constitutional:       General: She is not in acute distress  Appearance: Normal appearance  She is obese  Genitourinary:      Vulva, bladder, rectum and urethral meatus normal       No lesions in the vagina  Right Labia: No rash, tenderness or lesions  Left Labia: No tenderness, lesions or rash  No labial fusion noted  No vaginal discharge or tenderness  No vaginal prolapse present  No vaginal atrophy present  Right Adnexa: not tender, not full and no mass present  Left Adnexa: not tender, not full and no mass present  No cervical motion tenderness or friability  Uterus is not enlarged or prolapsed  Breasts:     Breasts are soft  Right: No inverted nipple, mass, nipple discharge or skin change  Left: No inverted nipple, mass, nipple discharge or skin change  HENT:      Head: Normocephalic and atraumatic  Nose: Nose normal    Eyes:      Conjunctiva/sclera: Conjunctivae normal       Pupils: Pupils are equal, round, and reactive to light  Cardiovascular:      Rate and Rhythm: Normal rate and regular rhythm  Pulses: Normal pulses  Heart sounds: Normal heart sounds  Pulmonary:      Effort: Pulmonary effort is normal  No respiratory distress  Breath sounds: Normal breath sounds  No wheezing  Abdominal:      General: Abdomen is flat  There is no distension  Palpations: Abdomen is soft  Tenderness: There is no abdominal tenderness  There is no guarding  Musculoskeletal:         General: Normal range of motion  Cervical back: Normal range of motion and neck supple  Neurological:      General: No focal deficit present  Mental Status: She is alert and oriented to person, place, and time  Mental status is at baseline  Skin:     General: Skin is warm and dry  Psychiatric:         Mood and Affect: Mood normal          Behavior: Behavior normal          Thought Content: Thought content normal          Judgment: Judgment normal    Vitals and nursing note reviewed

## 2023-11-13 ENCOUNTER — OFFICE VISIT (OUTPATIENT)
Dept: URGENT CARE | Facility: CLINIC | Age: 38
End: 2023-11-13
Payer: COMMERCIAL

## 2023-11-13 VITALS
BODY MASS INDEX: 35.72 KG/M2 | TEMPERATURE: 97.5 F | DIASTOLIC BLOOD PRESSURE: 83 MMHG | SYSTOLIC BLOOD PRESSURE: 138 MMHG | OXYGEN SATURATION: 98 % | WEIGHT: 210 LBS | HEART RATE: 118 BPM | RESPIRATION RATE: 16 BRPM

## 2023-11-13 DIAGNOSIS — H66.91 RIGHT OTITIS MEDIA, UNSPECIFIED OTITIS MEDIA TYPE: Primary | ICD-10-CM

## 2023-11-13 PROCEDURE — 99213 OFFICE O/P EST LOW 20 MIN: CPT | Performed by: PHYSICIAN ASSISTANT

## 2023-11-13 RX ORDER — AMOXICILLIN 875 MG/1
875 TABLET, COATED ORAL 2 TIMES DAILY
Qty: 20 TABLET | Refills: 0 | Status: SHIPPED | OUTPATIENT
Start: 2023-11-13 | End: 2023-11-20 | Stop reason: ALTCHOICE

## 2023-11-13 NOTE — PROGRESS NOTES
[facility]        NAME: Aviva Estrada is a 45 y.o. female  : 1985    MRN: [id number]  DATE: 2023  TIME: 6:39 PM    Assessment and Plan   Right otitis media, unspecified otitis media type [H66.91]  1. Right otitis media, unspecified otitis media type  amoxicillin (AMOXIL) 875 mg tablet            Patient Instructions     Patient was educated on right ear infection. Patient was educated on antibiotics. Patient was told to take OTC Tylenol for any fever or pain    Chief Complaint     Chief Complaint   Patient presents with    Cold Like Symptoms     Pt states she was sick with fever and cough last week. Covid negative. Now has a right earache. History of Present Illness       Patient is here today complaining of right ear pain for two days. Denies any allergies to medications. Denies any current history of asthma or diabetes. Earache   There is pain in the right ear. This is a new problem. The current episode started in the past 7 days. The problem occurs every few hours. The problem has been waxing and waning. The maximum temperature recorded prior to her arrival was 100.4 - 100.9 F. The fever has been present for Less than 1 day. The pain is at a severity of 6/10. Associated symptoms include coughing. Pertinent negatives include no abdominal pain, diarrhea, hearing loss, neck pain, rash, rhinorrhea, sore throat or vomiting. Review of Systems   Review of Systems   Constitutional: Negative. HENT:  Positive for ear pain. Negative for hearing loss, rhinorrhea and sore throat. Respiratory:  Positive for cough. Gastrointestinal:  Negative for abdominal pain, diarrhea and vomiting. Musculoskeletal:  Negative for neck pain. Skin:  Negative for rash. Psychiatric/Behavioral: Negative.            Current Medications       Current Outpatient Medications:     amoxicillin (AMOXIL) 875 mg tablet, Take 1 tablet (875 mg total) by mouth 2 (two) times a day for 10 days, Disp: 20 tablet, Rfl: 0    acetaminophen (TYLENOL) 325 mg tablet, Take 2 tablets (650 mg total) by mouth every 6 (six) hours as needed for headaches (Patient not taking: Reported on 11/13/2023), Disp:  , Rfl:     ibuprofen (MOTRIN) 200 mg tablet, Take 3 tablets (600 mg total) by mouth every 6 (six) hours as needed for mild pain (Patient not taking: Reported on 11/13/2023), Disp: , Rfl:     Multiple Vitamins-Minerals (MULTI-VITAMIN GUMMIES PO), Take by mouth (Patient not taking: Reported on 11/13/2023), Disp: , Rfl:     Current Allergies     Allergies as of 11/13/2023    (No Known Allergies)            The following portions of the patient's history were reviewed and updated as appropriate: allergies, current medications, past family history, past medical history, past social history, past surgical history and problem list.     Past Medical History:   Diagnosis Date    Varicella     childhood       Past Surgical History:   Procedure Laterality Date    WISDOM TOOTH EXTRACTION         Family History   Problem Relation Age of Onset    Depression Mother     Crohn's disease Mother     Cancer Mother         breast    Mental illness Mother         anx    Breast cancer Mother     No Known Problems Father     Asthma Sister     Stroke Maternal Grandmother     Heart disease Maternal Grandfather         failure    Heart failure Maternal Grandfather     Cancer Paternal Grandfather         prostate    Crohn's disease Sister     No Known Problems Son     No Known Problems Son          Medications have been verified. Objective   /83   Pulse (!) 118   Temp 97.5 °F (36.4 °C)   Resp 16   Wt 95.3 kg (210 lb)   SpO2 98%   BMI 35.72 kg/m²   No LMP recorded. Physical Exam     Physical Exam  Vitals and nursing note reviewed. Constitutional:       Appearance: Normal appearance. HENT:      Head: Normocephalic.       Ears:      Comments: Right TM is red and bulging  Eyes:      Extraocular Movements: Extraocular movements intact. Pupils: Pupils are equal, round, and reactive to light. Cardiovascular:      Rate and Rhythm: Normal rate and regular rhythm. Heart sounds: Normal heart sounds. Pulmonary:      Breath sounds: Normal breath sounds. Neurological:      Mental Status: She is oriented to person, place, and time.    Psychiatric:         Mood and Affect: Mood normal.         Behavior: Behavior normal.

## 2023-11-13 NOTE — PATIENT INSTRUCTIONS
Patient was educated on right ear infection. Patient was educated on antibiotics. Patient was told to take OTC Tylenol for any fever or pain    Ear Infection   WHAT YOU NEED TO KNOW:   An ear infection is also called otitis media. Blocked or swollen eustachian tubes can cause an infection. Eustachian tubes connect the middle ear to the back of the nose and throat. They drain fluid from the middle ear. You may have a buildup of fluid in your ear. Germs build up in the fluid and infection develops. DISCHARGE INSTRUCTIONS:   Return to the emergency department if:   You have clear fluid coming from your ear. You have a stiff neck, headache, and a fever. Call your doctor if:   You see blood or pus draining from your ear. Your ear pain gets worse or does not go away, even after treatment. The outside of your ear is red or swollen. You are vomiting or have diarrhea. You have questions or concerns about your condition or care. Medicines: You may  need any of the following:  Acetaminophen  decreases pain and fever. It is available without a doctor's order. Ask how much to take and how often to take it. Follow directions. Read the labels of all other medicines you are using to see if they also contain acetaminophen, or ask your doctor or pharmacist. Acetaminophen can cause liver damage if not taken correctly. NSAIDs , such as ibuprofen, help decrease swelling, pain, and fever. This medicine is available with or without a doctor's order. NSAIDs can cause stomach bleeding or kidney problems in certain people. If you take blood thinner medicine, always ask your healthcare provider if NSAIDs are safe for you. Always read the medicine label and follow directions. Ear drops  may contain medicine to decrease pain and inflammation. Antibiotics  help treat a bacterial infection. Take your medicine as directed.   Contact your healthcare provider if you think your medicine is not helping or if you have side effects. Tell your provider if you are allergic to any medicine. Keep a list of the medicines, vitamins, and herbs you take. Include the amounts, and when and why you take them. Bring the list or the pill bottles to follow-up visits. Carry your medicine list with you in case of an emergency. Self-care:   Apply heat  on your ear for 15 to 20 minutes, 3 to 4 times a day or as directed. You can apply heat with an electric heating pad, hot water bottle, or warm compress. Always put a cloth between your skin and the heat pack to prevent burns. Heat helps decrease pain. Apply ice  on your ear for 15 to 20 minutes, 3 to 4 times a day for 2 days or as directed. Use an ice pack, or put crushed ice in a plastic bag. Cover it with a towel before you apply it to your ear. Ice decreases swelling and pain. Prevent an ear infection:   Wash your hands often  to help prevent the spread of germs. Ask everyone in your house to wash their hands with soap and water. Ask them to wash after they use the bathroom or change a diaper. Remind them to wash before they prepare or eat food. Stay away from people who are ill. Some germs spread easily and quickly through contact. Follow up with your doctor as directed:  Write down your questions so you remember to ask them during your visits. © Copyright [employer] 2023 Information is for End User's use only and may not be sold, redistributed or otherwise used for commercial purposes. The above information is an  only. It is not intended as medical advice for individual conditions or treatments. Talk to your doctor, nurse or pharmacist before following any medical regimen to see if it is safe and effective for you.

## 2023-11-20 ENCOUNTER — OFFICE VISIT (OUTPATIENT)
Dept: FAMILY MEDICINE CLINIC | Facility: CLINIC | Age: 38
End: 2023-11-20
Payer: COMMERCIAL

## 2023-11-20 VITALS
SYSTOLIC BLOOD PRESSURE: 120 MMHG | DIASTOLIC BLOOD PRESSURE: 80 MMHG | TEMPERATURE: 98 F | HEIGHT: 64 IN | RESPIRATION RATE: 16 BRPM | BODY MASS INDEX: 37.39 KG/M2 | HEART RATE: 77 BPM | OXYGEN SATURATION: 98 % | WEIGHT: 219 LBS

## 2023-11-20 DIAGNOSIS — H66.91 RIGHT OTITIS MEDIA, UNSPECIFIED OTITIS MEDIA TYPE: Primary | ICD-10-CM

## 2023-11-20 DIAGNOSIS — R19.7 DIARRHEA, UNSPECIFIED TYPE: ICD-10-CM

## 2023-11-20 PROCEDURE — 99213 OFFICE O/P EST LOW 20 MIN: CPT | Performed by: NURSE PRACTITIONER

## 2023-11-20 RX ORDER — CEFDINIR 300 MG/1
300 CAPSULE ORAL EVERY 12 HOURS SCHEDULED
Qty: 20 CAPSULE | Refills: 0 | Status: SHIPPED | OUTPATIENT
Start: 2023-11-20 | End: 2023-11-30

## 2023-11-20 RX ORDER — SACCHAROMYCES BOULARDII 250 MG
250 CAPSULE ORAL 2 TIMES DAILY
Qty: 60 CAPSULE | Refills: 0 | Status: SHIPPED | OUTPATIENT
Start: 2023-11-20

## 2023-11-20 NOTE — PROGRESS NOTES
FAMILY PRACTICE OFFICE VISIT       NAME: Joya Sánchez  AGE: 45 y.o. SEX: female       : 1985        MRN: [id number]    Assessment and Plan   1. Right otitis media, unspecified otitis media type  -     cefdinir (OMNICEF) 300 mg capsule; Take 1 capsule (300 mg total) by mouth every 12 (twelve) hours for 10 days    2. Diarrhea, unspecified type  -     saccharomyces boulardii (FLORASTOR) 250 mg capsule; Take 1 capsule (250 mg total) by mouth 2 (two) times a day         7 days on amoxicillin, minimal improvement. Stop amoxicillin. Start cefdinir. Call if symptoms are not improving by the end of the week. Florastor due to diarrhea on antibiotics. Call if diarrhea persists when antibiotics are completed. Chief Complaint     Chief Complaint   Patient presents with    Earache     Patient is here for rt ear pain, clogged 1 + wk       History of Present Illness     Nori Corley is a 45year old female presenting today for ear pain. 2 weeks ago was sick with URI symptoms. Now has right ear pain, clogged feeling, reduced hearing. On amoxicllin for right otitis media, prescribed at urgent care on . No popping or crackling. No more stabbing pain, but still clogged, and hearing decreased. Earache   There is pain in the right ear. This is a new problem. The current episode started in the past 7 days. The problem occurs every few hours. The problem has been waxing and waning. There has been no fever. The pain is at a severity of 4/10. Associated symptoms include hearing loss. Pertinent negatives include no abdominal pain, coughing, diarrhea, ear discharge, headaches, neck pain, rash, rhinorrhea, sore throat or vomiting. Review of Systems   Review of Systems   HENT:  Positive for ear pain and hearing loss. Negative for ear discharge, rhinorrhea and sore throat. Respiratory:  Negative for cough. Gastrointestinal:  Negative for abdominal pain, diarrhea and vomiting. Musculoskeletal:  Negative for neck pain. Skin:  Negative for rash. Neurological:  Negative for headaches. I have reviewed the patient's medical history in detail; there are no changes to the history as noted in the electronic medical record. Objective     Vitals:    11/20/23 1133   BP: 120/80   Pulse: 77   Resp: 16   Temp: 98 °F (36.7 °C)   TempSrc: Temporal   SpO2: 98%   Weight: 99.3 kg (219 lb)   Height: 5' 4.29" (1.633 m)     Wt Readings from Last 3 Encounters:   11/20/23 99.3 kg (219 lb)   11/13/23 95.3 kg (210 lb)   01/18/23 95.1 kg (209 lb 9.6 oz)     Physical Exam  Vitals and nursing note reviewed. Constitutional:       General: She is not in acute distress. Appearance: Normal appearance. She is not ill-appearing. HENT:      Head: Atraumatic. Right Ear: Tympanic membrane is erythematous and bulging. Left Ear: Tympanic membrane normal.      Nose: Nose normal.      Mouth/Throat:      Mouth: Mucous membranes are moist.      Pharynx: Oropharynx is clear. Cardiovascular:      Rate and Rhythm: Normal rate and regular rhythm. Heart sounds: No murmur heard. Pulmonary:      Effort: Pulmonary effort is normal.      Breath sounds: Normal breath sounds. Neurological:      Mental Status: She is alert. Psychiatric:         Mood and Affect: Mood normal.         Depression Screening and Follow-up Plan: Patient was screened for depression during today's encounter. They screened negative with a PHQ-2 score of 0. ALLERGIES:  No Known Allergies    Current Medications     Current Outpatient Medications   Medication Sig Dispense Refill    cefdinir (OMNICEF) 300 mg capsule Take 1 capsule (300 mg total) by mouth every 12 (twelve) hours for 10 days 20 capsule 0    saccharomyces boulardii (FLORASTOR) 250 mg capsule Take 1 capsule (250 mg total) by mouth 2 (two) times a day 60 capsule 0     No current facility-administered medications for this visit.          Health Maintenance Health Maintenance   Topic Date Due    COVID-19 Vaccine (3 - Pfizer series) 07/17/2021    BMI: Followup Plan  05/04/2023    Influenza Vaccine (1) 09/01/2023    Annual Physical  01/18/2024    Depression Screening  11/20/2024    BMI: Adult  11/20/2024    Cervical Cancer Screening  03/10/2026    DTaP,Tdap,and Td Vaccines (4 - Td or Tdap) 08/26/2030    HIV Screening  Completed    Hepatitis C Screening  Completed    Pneumococcal Vaccine: Pediatrics (0 to 5 Years) and At-Risk Patients (6 to 59 Years)  Aged Out    HIB Vaccine  Aged Out    IPV Vaccine  Aged Out    Hepatitis A Vaccine  Aged Out    Meningococcal ACWY Vaccine  Aged Out    HPV Vaccine  Aged [employer] History   Administered Date(s) Administered    COVID-19 PFIZER VACCINE 0.3 ML IM 04/29/2021, 05/22/2021    Hep B, adult 06/06/2016, 07/11/2016, 12/07/2016    INFLUENZA 09/15/2017, 10/12/2018    Influenza, injectable, quadrivalent, preservative free 0.5 mL 10/12/2018, 09/09/2020    Influenza, seasonal, injectable 10/07/2019    Influenza, seasonal, injectable, preservative free 09/15/2016    MMR 06/06/2016    Tdap 06/06/2016, 03/13/2019, 08/26/2020       REED Vick

## 2024-01-02 ENCOUNTER — OFFICE VISIT (OUTPATIENT)
Dept: FAMILY MEDICINE CLINIC | Facility: CLINIC | Age: 39
End: 2024-01-02
Payer: COMMERCIAL

## 2024-01-02 VITALS
TEMPERATURE: 98 F | WEIGHT: 226 LBS | OXYGEN SATURATION: 98 % | DIASTOLIC BLOOD PRESSURE: 80 MMHG | HEART RATE: 82 BPM | RESPIRATION RATE: 16 BRPM | BODY MASS INDEX: 38.58 KG/M2 | SYSTOLIC BLOOD PRESSURE: 110 MMHG | HEIGHT: 64 IN

## 2024-01-02 DIAGNOSIS — Z13.220 LIPID SCREENING: ICD-10-CM

## 2024-01-02 DIAGNOSIS — E66.09 CLASS 2 OBESITY DUE TO EXCESS CALORIES WITHOUT SERIOUS COMORBIDITY WITH BODY MASS INDEX (BMI) OF 38.0 TO 38.9 IN ADULT: Primary | ICD-10-CM

## 2024-01-02 PROCEDURE — 99213 OFFICE O/P EST LOW 20 MIN: CPT | Performed by: NURSE PRACTITIONER

## 2024-01-02 RX ORDER — MULTIVITAMIN
1 CAPSULE ORAL DAILY
COMMUNITY

## 2024-01-02 NOTE — PROGRESS NOTES
FAMILY PRACTICE OFFICE VISIT       NAME: Joya Sánchez  AGE: 38 y.o. SEX: female       : 1985        MRN: [id number]    Assessment and Plan   1. Class 2 obesity due to excess calories without serious comorbidity with body mass index (BMI) of 38.0 to 38.9 in adult  -     CBC; Future  -     Comprehensive metabolic panel; Future  -     TSH, 3rd generation; Future  -     CBC  -     Comprehensive metabolic panel  -     TSH, 3rd generation  -     Ambulatory Referral to Weight Management; Future    2. Lipid screening  -     Lipid panel; Future  -     Lipid panel         Spoke at length regarding obesity as chronic disease and different treatments options.   She does not want to do bariatric surgery.   We discussed medications such as phentermine, metformin, GLP-1, naltrexone, Wellbutrin, topiramate.   We discussed medical weight management program.   At this time we agree, given her motivation, willingness to work on weight loss, and follow a program, medical weight management is the best place to start.   Contact information provided.   Also we noted there is an informational virtual session on [employer] program scheduled for  that she could register and attend.       Chief Complaint     Chief Complaint   Patient presents with    wieght loss       History of Present Illness     Joya Sánchez is a 38 year old female presenting today.     Trying to lose weight  Went to the gym. 2 kids, hard to get there, but managing twice per week.   Counted calories for 1 year, used [employer] pal.   Intermittent fasting, 19 hours fasting per day for 12 weeks, lost minimal weight.  Did a low carb diet--caused anxiety, palpitations.  Lost 20 pounds, but can't keep weight off.   Has been trying for last 1.5 years to lose weight.    Mom has high LDL, sisters have HTN, fatty liver.   She is concerned about her health now and in the future.                                         Review of Systems   Review of Systems  "  Constitutional: Negative.    HENT: Negative.     Respiratory: Negative.     Cardiovascular: Negative.    Gastrointestinal: Negative.    Genitourinary: Negative.    Skin: Negative.    Neurological: Negative.    Psychiatric/Behavioral: Negative.         I have reviewed the patient's medical history in detail; there are no changes to the history as noted in the electronic medical record.    Objective     Vitals:    01/02/24 0748   BP: 110/80   Pulse: 82   Resp: 16   Temp: 98 °F (36.7 °C)   TempSrc: Temporal   SpO2: 98%   Weight: 103 kg (226 lb)   Height: 5' 4.29\" (1.633 m)     Wt Readings from Last 3 Encounters:   01/02/24 103 kg (226 lb)   11/20/23 99.3 kg (219 lb)   11/13/23 95.3 kg (210 lb)     Physical Exam  Vitals and nursing note reviewed.   Constitutional:       General: She is not in acute distress.     Appearance: Normal appearance. She is not ill-appearing.   Cardiovascular:      Rate and Rhythm: Normal rate and regular rhythm.      Heart sounds: No murmur heard.  Pulmonary:      Effort: Pulmonary effort is normal.      Breath sounds: Normal breath sounds.   Neurological:      Mental Status: She is alert.   Psychiatric:         Mood and Affect: Mood normal.            ALLERGIES:  No Known Allergies    Current Medications     Current Outpatient Medications   Medication Sig Dispense Refill    Multiple Vitamin (multivitamin) capsule Take 1 capsule by mouth daily      saccharomyces boulardii (FLORASTOR) 250 mg capsule Take 1 capsule (250 mg total) by mouth 2 (two) times a day (Patient not taking: Reported on 1/2/2024) 60 capsule 0     No current facility-administered medications for this visit.         Health Maintenance     Health Maintenance   Topic Date Due    Influenza Vaccine (1) 09/01/2023    COVID-19 Vaccine (3 - 2023-24 season) 09/01/2023    Annual Physical  01/18/2024    Depression Screening  11/20/2024    Cervical Cancer Screening  03/10/2026    DTaP,Tdap,and Td Vaccines (4 - Td or Tdap) 08/26/2030    " HIV Screening  Completed    Hepatitis C Screening  Completed    Pneumococcal Vaccine: Pediatrics (0 to 5 Years) and At-Risk Patients (6 to 64 Years)  Aged Out    HIB Vaccine  Aged Out    IPV Vaccine  Aged Out    Hepatitis A Vaccine  Aged Out    Meningococcal ACWY Vaccine  Aged Out    HPV Vaccine  Aged Out     Immunization History   Administered Date(s) Administered    COVID-19 PFIZER VACCINE 0.3 ML IM 04/29/2021, 05/22/2021    Hep B, adult 06/06/2016, 07/11/2016, 12/07/2016    INFLUENZA 09/15/2017, 10/12/2018    Influenza, injectable, quadrivalent, preservative free 0.5 mL 10/12/2018, 09/09/2020    Influenza, seasonal, injectable 10/07/2019    Influenza, seasonal, injectable, preservative free 09/15/2016    MMR 06/06/2016    Tdap 06/06/2016, 03/13/2019, 08/26/2020       REED Cardenas

## 2024-01-03 LAB
ALBUMIN SERPL-MCNC: 4.4 G/DL (ref 3.6–5.1)
ALBUMIN/GLOB SERPL: 1.6 (CALC) (ref 1–2.5)
ALP SERPL-CCNC: 43 U/L (ref 31–125)
ALT SERPL-CCNC: 20 U/L (ref 6–29)
AST SERPL-CCNC: 15 U/L (ref 10–30)
BILIRUB SERPL-MCNC: 0.4 MG/DL (ref 0.2–1.2)
BUN SERPL-MCNC: 21 MG/DL (ref 7–25)
BUN/CREAT SERPL: NORMAL (CALC) (ref 6–22)
CALCIUM SERPL-MCNC: 9.4 MG/DL (ref 8.6–10.2)
CHLORIDE SERPL-SCNC: 103 MMOL/L (ref 98–110)
CHOLEST SERPL-MCNC: 195 MG/DL
CHOLEST/HDLC SERPL: 3.4 (CALC)
CO2 SERPL-SCNC: 29 MMOL/L (ref 20–32)
CREAT SERPL-MCNC: 0.71 MG/DL (ref 0.5–0.97)
ERYTHROCYTE [DISTWIDTH] IN BLOOD BY AUTOMATED COUNT: 12 % (ref 11–15)
GFR/BSA.PRED SERPLBLD CYS-BASED-ARV: 112 ML/MIN/1.73M2
GLOBULIN SER CALC-MCNC: 2.8 G/DL (CALC) (ref 1.9–3.7)
GLUCOSE SERPL-MCNC: 89 MG/DL (ref 65–99)
HCT VFR BLD AUTO: 42.5 % (ref 35–45)
HDLC SERPL-MCNC: 58 MG/DL
HGB BLD-MCNC: 14.3 G/DL (ref 11.7–15.5)
LDLC SERPL CALC-MCNC: 118 MG/DL (CALC)
MCH RBC QN AUTO: 32.1 PG (ref 27–33)
MCHC RBC AUTO-ENTMCNC: 33.6 G/DL (ref 32–36)
MCV RBC AUTO: 95.3 FL (ref 80–100)
NONHDLC SERPL-MCNC: 137 MG/DL (CALC)
PLATELET # BLD AUTO: 254 THOUSAND/UL (ref 140–400)
PMV BLD REES-ECKER: 10.5 FL (ref 7.5–12.5)
POTASSIUM SERPL-SCNC: 4 MMOL/L (ref 3.5–5.3)
PROT SERPL-MCNC: 7.2 G/DL (ref 6.1–8.1)
RBC # BLD AUTO: 4.46 MILLION/UL (ref 3.8–5.1)
SODIUM SERPL-SCNC: 137 MMOL/L (ref 135–146)
TRIGL SERPL-MCNC: 91 MG/DL
TSH SERPL-ACNC: 2.53 MIU/L
WBC # BLD AUTO: 4.4 THOUSAND/UL (ref 3.8–10.8)

## 2024-03-05 ENCOUNTER — TELEPHONE (OUTPATIENT)
Age: 39
End: 2024-03-05

## 2024-03-05 ENCOUNTER — OFFICE VISIT (OUTPATIENT)
Dept: BARIATRICS | Facility: CLINIC | Age: 39
End: 2024-03-05
Payer: COMMERCIAL

## 2024-03-05 VITALS
BODY MASS INDEX: 35.59 KG/M2 | TEMPERATURE: 97.8 F | HEIGHT: 65 IN | DIASTOLIC BLOOD PRESSURE: 80 MMHG | WEIGHT: 213.6 LBS | SYSTOLIC BLOOD PRESSURE: 116 MMHG | HEART RATE: 95 BPM | RESPIRATION RATE: 16 BRPM

## 2024-03-05 DIAGNOSIS — E66.9 OBESITY, CLASS II, BMI 35-39.9: Primary | ICD-10-CM

## 2024-03-05 PROCEDURE — 99203 OFFICE O/P NEW LOW 30 MIN: CPT | Performed by: NURSE PRACTITIONER

## 2024-03-05 RX ORDER — TIRZEPATIDE 2.5 MG/.5ML
2.5 INJECTION, SOLUTION SUBCUTANEOUS WEEKLY
Qty: 2 ML | Refills: 0 | Status: SHIPPED | OUTPATIENT
Start: 2024-03-05 | End: 2024-04-02

## 2024-03-05 RX ORDER — AMOXICILLIN AND CLAVULANATE POTASSIUM 875; 125 MG/1; MG/1
TABLET, FILM COATED ORAL
COMMUNITY
Start: 2024-02-27

## 2024-03-05 RX ORDER — MULTIVIT-MINERALS/FOLIC ACID 200 MCG
TABLET,CHEWABLE ORAL
COMMUNITY

## 2024-03-05 NOTE — PROGRESS NOTES
Assessment/Plan:    Obesity, Class II, BMI 35-39.9  - Discussed options of [facility]-Intensive Lifestyle Intervention Program, Very Low Calorie Diet-VLCD, and Conservative Program and the role of weight loss medications.  - Not interested in weight loss surgery.  - Explained the importance of making lifestyle changes with anti-obesity medications.  - Patient is interested in pursuing Conservative Program with body stats package.  - Initial weight loss goal of 5-10% weight loss for improved health  - Weight loss can improve patient's co-morbid conditions and/or prevent weight-related complications.  - Stop Bang 3/8  - Patient denies personal history of pancreatitis. Patient also denies personal and family history of thyroid cancer and multiple endocrine neoplasia type 2 (MEN 2 tumor).   - Currently taking compounded tirzepatide through a telehealth program, but would like to start Zepbound through our program, as she is concerned about the safety of a compounded product.   - Start Zepbound 2.5 mg subcutaneously weekly. After you have taken the second pen, please give me an update, as we will likely increase the dose the next month if you are tolerating it well.  - Side effects of Zepbound include nausea, vomiting, diarrhea, or constipation. Keep an eye on your heart rate while on Zepbound. If you resting heart rate is greater than 100 beats per minutes, please notify me. If you develop severe abdominal pain, stop Zepbound and go to the emergency room, as that could be a sign of pancreatitis.   - Please notify me if you have surgery, upper endoscopy, or colonoscopy scheduled, as we typically hold Zepbound for one week prior to the procedure.   - Zepbound can reduce the effectiveness of oral hormonal birth control (birth control pills). Recommend a barrier backup method such as condoms to prevent pregnancy.    - Advised to use contraception such as condoms, as pregnancy not recommended while taking weight loss  medications.   - Medication contract signed 3/5/2024.  - Labs reviewed: TSH, lipid, CMP, and CBC 1/3/2024. LDL elevated, which will likely improve with weight loss. Remainder of the blood work within acceptable range.  - Check A1C and TSH.      Goals:  Do not skip meals.  Food log (ie.) www.myfitnesspal.com,sparkpeople.com,loseit.com,calorieking.com,etc. baritastic (use skinnytaste.com, [employer] or smartphone antonette Yillio for recipes)  No sugary beverages. At least 64oz of water daily.  Increase physical activity by 10 minutes daily. Gradually increase physical activity to a goal of 5 days per week for 30 minutes of MODERATE intensity PLUS 2 days per week of FULL BODY resistance training (use smartphone apps [employer], Home Workout, etc.)   Start food logging, weighing, and measuring food.  1200 calories per day. Sample menu given.   Get at least 64 oz of water daily.   Gradually increase walking to goal of 5 days per week for 30 minutes and 2-3 days per week resistance training.   Start Zepbound one week after taking last dose of compounded tirzepatide.          Joya was seen today for consult.    Diagnoses and all orders for this visit:    Obesity, Class II, BMI 35-39.9  -     tirzepatide (Zepbound) 2.5 mg/0.5 mL auto-injector; Inject 0.5 mL (2.5 mg total) under the skin once a week for 28 days  -     Hemoglobin A1C; Future  -     Insulin, fasting; Future        Total time spent: 35 min, with >50% face-to-face time spent counseling patient on nonsurgical interventions for the treatment of excess weight. Discussed in detail nonsurgical options including intensive lifestyle intervention program, very low-calorie diet program and conservative program.  Discussed the role of weight loss medications.  Counseled patient on diet behavior and exercise modification for weight loss.            Follow up in approximately  2 month nurse visit and 4 months  with Non-Surgical Physician/Advanced  Practitioner.    Subjective:   Chief Complaint   Patient presents with    Consult     MWM- Consult, GW 150lb       Patient ID: Joya Sánchez  is a 39 y.o. female with excess weight/obesity here to pursue weight management.  Previous notes and records have been reviewed.    Past Medical History:   Diagnosis Date    Varicella     childhood     Past Surgical History:   Procedure Laterality Date    WISDOM TOOTH EXTRACTION         HPI:  Wt Readings from Last 20 Encounters:   03/05/24 96.9 kg (213 lb 9.6 oz)   01/02/24 103 kg (226 lb)   11/20/23 99.3 kg (219 lb)   11/13/23 95.3 kg (210 lb)   01/18/23 95.1 kg (209 lb 9.6 oz)   05/04/22 104 kg (230 lb)   03/10/21 107 kg (236 lb)   12/08/20 112 kg (248 lb)   11/12/20 122 kg (268 lb)   11/10/20 122 kg (268 lb)   11/04/20 121 kg (267 lb)   10/27/20 118 kg (260 lb)   10/22/20 118 kg (259 lb 3.2 oz)   10/20/20 118 kg (260 lb)   10/15/20 115 kg (254 lb)   10/05/20 115 kg (254 lb)   09/21/20 113 kg (248 lb 6.4 oz)   09/17/20 111 kg (245 lb)   09/10/20 111 kg (244 lb 6.4 oz)   09/10/20 111 kg (244 lb 9.6 oz)     Obesity/Excess Weight:  Severity: Moderate  Onset:  since age 20    Modifiers: Diet and Exercise, Physician Supervised Weight Loss Program, and Prescription Weight Loss Medications  Contributing factors: Poor Food Choices, Stress/Emotional Eating, and anxiety  Associated symptoms: depression    Currently taking compounded tirzepatide through a telehealth group. Has been on this for about one month. Feels flushed intermittently, but does not find this bothersome. Has been helping with appetite. She is concerned about the safety of taking a compounded substance and would like to start Zepbound through an established weight management program.     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    Family History   Problem Relation Age of Onset    Depression Mother     Crohn's disease Mother     Cancer Mother         breast    Mental illness Mother         anx    Breast cancer Mother     No Known Problems Father     Asthma Sister     Stroke Maternal Grandmother     Heart disease Maternal Grandfather         failure    Heart failure Maternal Grandfather     Cancer Paternal Grandfather         prostate    Crohn's disease Sister     No Known Problems Son     No Known Problems Son         Review of Systems   HENT:  Negative for sore throat.    Respiratory:  Negative for cough and shortness of breath.    Cardiovascular:  Negative for chest pain and palpitations.   Gastrointestinal:  Positive for diarrhea (antibiotic for sinus infection). Negative for abdominal pain, constipation, nausea and vomiting.        Denies GERD   Endocrine: Positive for cold intolerance (intermittent) and heat intolerance (intermittent).   Genitourinary:  Negative for dysuria.   Musculoskeletal:  Negative for arthralgias and back pain.   Skin:  Negative for rash.   Neurological:  Negative for headaches.   Psychiatric/Behavioral:  Negative for suicidal ideas (or HI).         Denies depression   + anxiety       Objective:  /80   Pulse 95   Temp 97.8 °F (36.6 °C)   Resp 16   Ht 5' 4.5\" (1.638 m)   Wt 96.9 kg (213 lb 9.6 oz)   BMI 36.10 kg/m²     Physical Exam  Vitals and nursing note reviewed.        Constitutional   General appearance: Abnormal.  well developed and obese.   Eyes No conjunctival injection.   Ears, Nose, " Mouth, and Throat Oral mucosa moist.   Pulmonary   Respiratory effort: No increased work of breathing or signs of respiratory distress.     Cardiovascular     Examination of extremities for edema and/or varicosities: Normal.  no edema.   Abdomen   Abdomen: Abnormal.  The abdomen was obese.    Musculoskeletal   Normal range of motion  Neurological   Gait and station: Normal.    Psychiatric   Orientation to person, place and time: Normal.    Affect: appropriate

## 2024-03-05 NOTE — PATIENT INSTRUCTIONS
I have sent Zebound to your pharmacy. The prior authorization process will been done through our prior authorization team and can take up to 3-4 weeks to process through the insurance.     - Start Zepbound 2.5 mg subcutaneously weekly. After you have taken the second pen, please give me an update, as we will likely increase the dose the next month if you are tolerating it well.    - Side effects of Zepbound include nausea, vomiting, diarrhea, or constipation. Keep an eye on your heart rate while on Zepbound. If you resting heart rate is greater than 100 beats per minutes, please notify me. If you develop severe abdominal pain, stop Zepbound and go to the emergency room, as that could be a sign of pancreatitis.     - Please notify me if you have surgery, upper endoscopy, or colonoscopy scheduled, as we typically hold Zepbound for one week prior to the procedure.     - Zepbound can reduce the effectiveness of oral hormonal birth control (birth control pills). Recommend a barrier backup method such as condoms to prevent pregnancy.

## 2024-03-06 NOTE — TELEPHONE ENCOUNTER
PA for zepbound    Submitted via    [x]CMM-KEY [id number]  []Surescripts-Case ID #   []Faxed to plan   []Other website   []Phone call Case ID #     Office notes sent, clinical questions answered. Awaiting determination    Turnaround time for your insurance to make a decision on your Prior Authorization can take 7-21 business days.            
weight-bearing as tolerated/R lE

## 2024-03-06 NOTE — ASSESSMENT & PLAN NOTE
- Discussed options of [facility]-Intensive Lifestyle Intervention Program, Very Low Calorie Diet-VLCD, and Conservative Program and the role of weight loss medications.  - Not interested in weight loss surgery.  - Explained the importance of making lifestyle changes with anti-obesity medications.  - Patient is interested in pursuing Conservative Program with body stats package.  - Initial weight loss goal of 5-10% weight loss for improved health  - Weight loss can improve patient's co-morbid conditions and/or prevent weight-related complications.  - Stop Bang 3/8  - Patient denies personal history of pancreatitis. Patient also denies personal and family history of thyroid cancer and multiple endocrine neoplasia type 2 (MEN 2 tumor).   - Currently taking compounded tirzepatide through a telehealth program, but would like to start Zepbound through our program, as she is concerned about the safety of a compounded product.   - Start Zepbound 2.5 mg subcutaneously weekly. After you have taken the second pen, please give me an update, as we will likely increase the dose the next month if you are tolerating it well.  - Side effects of Zepbound include nausea, vomiting, diarrhea, or constipation. Keep an eye on your heart rate while on Zepbound. If you resting heart rate is greater than 100 beats per minutes, please notify me. If you develop severe abdominal pain, stop Zepbound and go to the emergency room, as that could be a sign of pancreatitis.   - Please notify me if you have surgery, upper endoscopy, or colonoscopy scheduled, as we typically hold Zepbound for one week prior to the procedure.   - Zepbound can reduce the effectiveness of oral hormonal birth control (birth control pills). Recommend a barrier backup method such as condoms to prevent pregnancy.    - Advised to use contraception such as condoms, as pregnancy not recommended while taking weight loss medications.   - Medication contract signed 3/5/2024.  -  Labs reviewed: TSH, lipid, CMP, and CBC 1/3/2024. LDL elevated, which will likely improve with weight loss. Remainder of the blood work within acceptable range.  - Check A1C and TSH.      Goals:  Do not skip meals.  Food log (ie.) www.[employer].com,sparkpeople.com,loseit.com,calorieking.com,etc. baritastic (use skinnytaste.com, [employer] or smartphone antonette CITTIO for recipes)  No sugary beverages. At least 64oz of water daily.  Increase physical activity by 10 minutes daily. Gradually increase physical activity to a goal of 5 days per week for 30 minutes of MODERATE intensity PLUS 2 days per week of FULL BODY resistance training (use smartphone apps [employer], Home Workout, etc.)   Start food logging, weighing, and measuring food.  1200 calories per day. Sample menu given.   Get at least 64 oz of water daily.   Gradually increase walking to goal of 5 days per week for 30 minutes and 2-3 days per week resistance training.   Start Zepbound one week after taking last dose of compounded tirzepatide.

## 2024-03-09 LAB
HBA1C MFR BLD: 5.2 % OF TOTAL HGB
INSULIN SERPL-ACNC: 3.9 UIU/ML

## 2024-03-11 ENCOUNTER — TELEPHONE (OUTPATIENT)
Dept: BARIATRICS | Facility: CLINIC | Age: 39
End: 2024-03-11

## 2024-03-11 NOTE — TELEPHONE ENCOUNTER
----- Message from REED Ospina sent at 3/10/2024  1:33 PM EDT -----  Please let the patient know I reviewed her A1C and fasting insulin and they were both within normal range.

## 2024-03-15 NOTE — PROGRESS NOTES
Weight Management Medical Nutrition Assessment    Joya presented for body stats package. Today's weight is 208.67, down 4.93# since MWM provider visit. Currently taking compounded tirzepatide through a telehealth program, but would like to start Zepbound through our program. She is tracking 6351-4558 calories, is generally active throughout the day. We discussed healthy snack options with protein as right now she is choosing her kids snacks like goldfish and chips. Hydration adequate. We developed and reviewed a low calorie meal plan. Completed a body composition using SECA scale and reviewed results with patient. Reevue indirect calorimter revealed REE is fast compared to the predictive normal for someone her same age, height, and gender. Reevue Indirect Calorimeter REE: 2829    Weight loss without exercise: 4130-2431          Weight loss with exercise:    8699-0144                  Maintenance:    2016-2619     Will follow up in 1 mo for menu planning.    Patient seen by Medical Provider in past 6 months:  yes REED Ospina 3/5/24  Requested to schedule appointment with Medical Provider: No      Anthropometric Measurements  Start Weight (#): 208.67 (3/22/24)  Current Weight (#): 208.67  TBW % Change from start weight:n/a  Ideal Body Weight (#):122.5  Goal Weight (#):150  Highest: 230  Lowest:    Weight Loss History  Previous weight loss attempts: Diet and Exercise, Physician Supervised Weight Loss Program, and Prescription Weight Loss Medications     Food and Nutrition Related History  Wake up: 6:30-7am   Bed Time: 10-10:30pm    Food Recall  Breakfast:7:30-8am greek yogurt, 2 eggs, 2 sourdough toast     Snack: skip  Lunch:11:30-12pm sandwich turkey or ham on kenneth killer thin bread or homemade bread w/ avocado mayonnaise and golfish/potato chips OR chicken salad   Snack: 3pm pretzels/crackers OR hummus    Dinner: 5-5:30pm 4-5 oz chicken/turkey/fish, veggie (carrots, broccoli, green beans), 1 cup starch  (potato, brown rice, beans)   Snack: skip      Beverages: 64+ oz water occ with true lime stevia packet, 1 cup coffee with nut pods   Alcohol: maybe 4-5x/year     Weekends: Same   Cravings: not since started medication   Trouble area of day:none reported    Frequency of Eating out: not assessed  Food restrictions:red meat  Cooking: self   Food Shopping: self    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     Physical Activity Intake  Activity:30 minute walk everyday on treadmill or outside, youtube videos strength training, does gardening   Physical limitations/barriers to exercise: n/a    Estimated Needs  Energy  SECA: BMR:1615      X 1.3 -1000 =  1099  [address] [employer] Energy Needs: BMR : 1614   1-2# loss weekly sedentary:  937-1437             1-2# loss weekly lightly active:2794-0360  Maintenance calories for sedentary activity level: 1937  Protein:67-84      (1.2-1.5g/kg IBW)  Fluid: 65     (35mL/kg IBW)    Nutrition Diagnosis  Yes;    Overweight/obesity  related to Excess energy intake as evidenced by  BMI more than normative standard for age and sex (obesity-grade II 35-39.9)       Nutrition Intervention    Nutrition Prescription  Calories:9678-0120, flex up exercise days  Protein:70-85  Fluid:65oz    Meal Plan (Matthew/Pro/Carb)  Breakfast:300-400/20-30  Snack: 100-150/5-10  Lunch: 350/20  Snack: 100-150/5-10  Dinner: 350-400/30-35  Snack:    Nutrition Education:    Calorie controlled menu  Lean protein food choices  Healthy snack options  Food journaling tips      Nutrition Counseling:  Strategies: meal planning, portion sizes, healthy snack choices, hydration, fiber intake, protein intake, exercise, food journal      Monitoring and Evaluation:  Evaluation criteria:  Energy Intake  Meet protein needs  Maintain adequate hydration  Monitor weekly weight  Meal planning/preparation  Food journal   Decreased portions at mealtimes and snacks  Physical activity     Barriers to learning:none  Readiness to change:  Action:  (Changing behavior)  Comprehension: good  Expected Compliance: good

## 2024-03-19 ENCOUNTER — TELEPHONE (OUTPATIENT)
Age: 39
End: 2024-03-19

## 2024-03-20 NOTE — TELEPHONE ENCOUNTER
PA for Zepbound    Submitted via    []CMM-KEY    []Surescripts-Case ID #    []Faxed to plan   [x]Other website Prior Auth (EOC) ID:  [id number]    []Phone call Case ID #      Office notes sent, clinical questions answered. Awaiting determination    Turnaround time for your insurance to make a decision on your Prior Authorization can take 7-21 business days.

## 2024-03-22 ENCOUNTER — CLINICAL SUPPORT (OUTPATIENT)
Dept: BARIATRICS | Facility: CLINIC | Age: 39
End: 2024-03-22

## 2024-03-22 VITALS — BODY MASS INDEX: 34.77 KG/M2 | HEIGHT: 65 IN | WEIGHT: 208.67 LBS

## 2024-03-22 DIAGNOSIS — R63.5 ABNORMAL WEIGHT GAIN: Primary | ICD-10-CM

## 2024-03-22 PROCEDURE — RECHECK

## 2024-03-22 PROCEDURE — BODSTAT PR BODY STAT

## 2024-04-15 ENCOUNTER — PATIENT MESSAGE (OUTPATIENT)
Dept: BARIATRICS | Facility: CLINIC | Age: 39
End: 2024-04-15

## 2024-04-15 DIAGNOSIS — E66.9 OBESITY, CLASS I, BMI 30-34.9: Primary | ICD-10-CM

## 2024-04-15 DIAGNOSIS — E66.9 OBESITY, CLASS II, BMI 35-39.9: Primary | ICD-10-CM

## 2024-04-15 RX ORDER — TIRZEPATIDE 5 MG/.5ML
5 INJECTION, SOLUTION SUBCUTANEOUS WEEKLY
Qty: 2 ML | Refills: 0 | Status: SHIPPED | OUTPATIENT
Start: 2024-04-15 | End: 2024-05-13

## 2024-05-10 ENCOUNTER — CLINICAL SUPPORT (OUTPATIENT)
Dept: BARIATRICS | Facility: CLINIC | Age: 39
End: 2024-05-10

## 2024-05-10 ENCOUNTER — TELEPHONE (OUTPATIENT)
Dept: BARIATRICS | Facility: CLINIC | Age: 39
End: 2024-05-10

## 2024-05-10 VITALS
DIASTOLIC BLOOD PRESSURE: 75 MMHG | HEART RATE: 86 BPM | BODY MASS INDEX: 33.55 KG/M2 | RESPIRATION RATE: 17 BRPM | HEIGHT: 65 IN | TEMPERATURE: 97 F | SYSTOLIC BLOOD PRESSURE: 110 MMHG | WEIGHT: 201.4 LBS

## 2024-05-10 DIAGNOSIS — E66.9 OBESITY, CLASS II, BMI 35-39.9: ICD-10-CM

## 2024-05-10 DIAGNOSIS — R63.5 ABNORMAL WEIGHT GAIN: Primary | ICD-10-CM

## 2024-05-10 PROCEDURE — RECHECK

## 2024-05-10 RX ORDER — TIRZEPATIDE 5 MG/.5ML
5 INJECTION, SOLUTION SUBCUTANEOUS WEEKLY
Qty: 2 ML | Refills: 1 | Status: SHIPPED | OUTPATIENT
Start: 2024-05-10 | End: 2024-07-05

## 2024-05-10 NOTE — PROGRESS NOTES
Patient last visit weight:213lb  Patient current visit weight:201LB    If you are taking phentermine or other oral weight loss medications, are you experiencing any of the following symptoms:  Headache:   Blurred Vision:   Chest Pain:   Palpitations:  Insomnia:   SPECIFY ORAL MEDICATION AND DOSAGE:     If you are taking an injectable medication,  are you experiencing any of the following symptoms:  Bloating:   Nausea:  Vomiting:   Constipation:   Diarrhea:  SPECIFY INJECTABLE MEDICATION AND CURRENT DOSAGE: Zepbound 5 mg      Vitals:    Is BP less than 100/60? NO  Is BP greater than 140/90? NO  Is HR greater than 100? NO  **If yes to any of the above, have patient relax and repeat in 5-10 minutes**    Repeat values:    Is BP less than 100/60?  Is BP greater than 140/90?  Is HR greater than 100?  **If values remain outside of ranges above, please consult provider for next steps**

## 2024-05-21 RX ORDER — TIRZEPATIDE 2.5 MG/.5ML
2.5 INJECTION, SOLUTION SUBCUTANEOUS WEEKLY
Qty: 2 ML | Refills: 0 | Status: SHIPPED | OUTPATIENT
Start: 2024-05-21 | End: 2024-06-18

## 2024-05-27 ENCOUNTER — PATIENT MESSAGE (OUTPATIENT)
Dept: BARIATRICS | Facility: CLINIC | Age: 39
End: 2024-05-27

## 2024-05-27 DIAGNOSIS — E66.9 OBESITY, CLASS I, BMI 30-34.9: Primary | ICD-10-CM

## 2024-05-28 RX ORDER — TIRZEPATIDE 7.5 MG/.5ML
7.5 INJECTION, SOLUTION SUBCUTANEOUS WEEKLY
Qty: 2 ML | Refills: 0 | Status: SHIPPED | OUTPATIENT
Start: 2024-05-28 | End: 2024-06-25

## 2024-07-01 DIAGNOSIS — E66.9 OBESITY, CLASS I, BMI 30-34.9: Primary | ICD-10-CM

## 2024-07-01 RX ORDER — TIRZEPATIDE 10 MG/.5ML
10 INJECTION, SOLUTION SUBCUTANEOUS WEEKLY
Qty: 2 ML | Refills: 1 | Status: SHIPPED | OUTPATIENT
Start: 2024-07-01

## 2024-07-11 ENCOUNTER — OFFICE VISIT (OUTPATIENT)
Dept: BARIATRICS | Facility: CLINIC | Age: 39
End: 2024-07-11
Payer: COMMERCIAL

## 2024-07-11 VITALS
HEIGHT: 65 IN | DIASTOLIC BLOOD PRESSURE: 78 MMHG | RESPIRATION RATE: 16 BRPM | WEIGHT: 190.8 LBS | TEMPERATURE: 98.3 F | BODY MASS INDEX: 31.79 KG/M2 | HEART RATE: 93 BPM | SYSTOLIC BLOOD PRESSURE: 120 MMHG

## 2024-07-11 DIAGNOSIS — E66.9 OBESITY, CLASS I, BMI 30-34.9: Primary | ICD-10-CM

## 2024-07-11 PROBLEM — E66.812 OBESITY, CLASS II, BMI 35-39.9: Status: RESOLVED | Noted: 2022-05-04 | Resolved: 2024-07-11

## 2024-07-11 PROCEDURE — 99213 OFFICE O/P EST LOW 20 MIN: CPT | Performed by: INTERNAL MEDICINE

## 2024-07-11 RX ORDER — TIRZEPATIDE 7.5 MG/.5ML
7.5 INJECTION, SOLUTION SUBCUTANEOUS WEEKLY
COMMUNITY
End: 2024-07-11

## 2024-07-11 RX ORDER — TIRZEPATIDE 10 MG/.5ML
10 INJECTION, SOLUTION SUBCUTANEOUS WEEKLY
Qty: 2 ML | Refills: 2 | Status: SHIPPED | OUTPATIENT
Start: 2024-07-11

## 2024-07-11 NOTE — PROGRESS NOTES
Assessment/Plan:  Joya was seen today for follow-up.    Diagnoses and all orders for this visit:    Obesity, Class I, BMI 30-34.9  -     tirzepatide (Zepbound) 10 mg/0.5 mL auto-injector; Inject 0.5 mL (10 mg total) under the skin once a week       Highest weight: 230 lbs  Prior weight: 213.6 lbs BMI 36.10 (Zepbound start weight 3/5/24)  Current 190  Change -23.6 (-10%)  Goal weight: 150 lbs    - Weight not at goal  - Patient is interested in Conservative Program  - Labs reviewed: As below.    General Recommendations:  Nutrition:  Eat breakfast daily.  Do not skip meals.     Food log (ie.) www.myfitnesspal.com, sparkpeople.com, loseit.com, calorieking.com, etc.    Practice mindful eating.  Be sure to set aside time to eat, eat slowly, and savor your food.    Hydration:    At least 64oz of water daily.  No sugar sweetened beverages.  No juice (eat the fruit instead).    Exercise:  Studies have shown that the ideal exercise goal is somewhere between 150 to 300 minutes of moderate intensity exercise a week.  Start with exercising 10 minutes every other day and gradually increase physical activity with a goal of at least 150 minutes of moderate intensity exercise a week, divided over at least 3 days a week.  An example of this would be exercising 30 minutes a day, 5 days a week.  Resistance training can increase muscle mass and increase our resting metabolic rate.   FULL BODY resistance training is recommended 2-3 times a week.  Do not do this on consecutive days to allow for muscle recovery.    Aim for a bare minimum 5000 steps, even on days you do not exercise.    Monitoring:   Weigh yourself daily.  If this causes undue stress, then just weigh yourself once a week.  Weigh yourself the same time of the day with the same amount of clothing on.  Preferably this should be done after waking up, before you eat, and with no clothing or minimal clothing on.    Specific Goals:  Food log (ie.)  www.myfitnesspal.com,[employer] Gamepeople.com,loseit.com,[employer].com,etc.     No sugary beverages. At least 64oz of water daily.    Increase physical activity by 10 minutes daily    Increase Zepbound to 10mg weekly once you complete the supply of Zepbound 7.5 mg weekly.    Weight check in 2 months.    Follow-up in 4 months.    Total time spent reviewing chart, interviewing patient, examining patient, discussing plan, answering all questions, and documentin min.       ______________________________________________________________________      Subjective:   Chief Complaint   Patient presents with    Follow-up     MWM 4M F/u; Waist 35.5in     Patient here to discuss weight associated problems and nutrition goals  HPI: Joya Sánchez  is a 39 y.o. female with history of excess weight.  Weight loss plan:  Conservative Program.   Most recent notes and records were reviewed.    Wt Readings from Last 10 Encounters:   24 86.5 kg (190 lb 12.8 oz)   05/10/24 91.4 kg (201 lb 6.4 oz)   24 94.7 kg (208 lb 10.7 oz)   24 96.9 kg (213 lb 9.6 oz)   24 103 kg (226 lb)   23 99.3 kg (219 lb)   23 95.3 kg (210 lb)   23 95.1 kg (209 lb 9.6 oz)   22 104 kg (230 lb)   03/10/21 107 kg (236 lb)     Highest weight: 230 lbs  Prior weight: 213.6 lbs BMI 36.10 (Zepbound start weight 3/5/24)  Current 190  Change -23.6 (-10%)  Goal weight: 150 lbs      Hunger/Cravings:  Reduced  Improved knee pain  Dining out: Limited  Hydration: Adequate  Weight Monitoring: Yes      Patient denies personal and family history of pancreatitis, thyroid cancer, MEN-2 tumors.    The following portions of the patient's history were reviewed and updated as appropriate: allergies, current medications, past family history, past medical history, past social history, past surgical history, and problem list.    Review Of Systems:  Review of Systems   Constitutional:  Negative for activity change, appetite change, fatigue and  "fever.   Respiratory:  Negative for cough and shortness of breath.    Cardiovascular:  Negative for chest pain, palpitations and leg swelling.   Gastrointestinal:  Negative for abdominal pain, constipation, diarrhea, nausea and vomiting.   Endocrine: Negative for cold intolerance and heat intolerance.   Genitourinary:  Negative for difficulty urinating and dysuria.   Musculoskeletal:  Negative for arthralgias, back pain and gait problem.   Skin:  Negative for pallor and rash.   Neurological:  Negative for headaches.   Psychiatric/Behavioral:  Negative for dysphoric mood, sleep disturbance and suicidal ideas (or HI). The patient is not nervous/anxious.        Objective:  /78   Pulse 93   Temp 98.3 °F (36.8 °C)   Resp 16   Ht 5' 4.5\" (1.638 m)   Wt 86.5 kg (190 lb 12.8 oz)   BMI 32.24 kg/m²   Physical Exam  Vitals and nursing note reviewed.   Constitutional:       General: She is not in acute distress.     Appearance: Normal appearance. She is not ill-appearing or diaphoretic.   Eyes:      General: No scleral icterus.  Cardiovascular:      Rate and Rhythm: Normal rate and regular rhythm.      Pulses: Normal pulses.      Heart sounds: No murmur heard.  Pulmonary:      Effort: Pulmonary effort is normal. No respiratory distress.      Breath sounds: Normal breath sounds. No wheezing or rhonchi.   Musculoskeletal:      Cervical back: Neck supple.      Right lower leg: No edema.      Left lower leg: No edema.   Lymphadenopathy:      Cervical: No cervical adenopathy.   Skin:     Capillary Refill: Capillary refill takes less than 2 seconds.      Findings: No lesion or rash.   Neurological:      Mental Status: She is alert and oriented to person, place, and time.      Gait: Gait normal.   Psychiatric:         Mood and Affect: Mood normal.         Behavior: Behavior normal.       Labs and Imaging  Recent labs and imaging have been personally reviewed.  Lab Results   Component Value Date    WBC 4.4 01/03/2024    HGB " 14.3 01/03/2024    HCT 42.5 01/03/2024    MCV 95.3 01/03/2024     01/03/2024     Lab Results   Component Value Date    SODIUM 137 01/03/2024    K 4.0 01/03/2024     01/03/2024    CO2 29 01/03/2024    BUN 21 01/03/2024    CREATININE 0.71 01/03/2024    GLUC 89 01/03/2024    CALCIUM 9.4 01/03/2024    AST 15 01/03/2024    ALT 20 01/03/2024    ALKPHOS 43 01/03/2024    TP 7.2 01/03/2024    TBILI 0.4 01/03/2024    EGFR 112 01/03/2024     Lab Results   Component Value Date    HGBA1C 5.2 03/08/2024     Lab Results   Component Value Date    TSH 2.53 01/03/2024     Lab Results   Component Value Date    CHOLESTEROL 195 01/03/2024     Lab Results   Component Value Date    HDL 58 01/03/2024     Lab Results   Component Value Date    TRIG 91 01/03/2024     Lab Results   Component Value Date    LDLCALC 118 (H) 01/03/2024

## 2024-07-11 NOTE — PATIENT INSTRUCTIONS
Highest weight: 230 lbs  Prior weight: 213.6 lbs BMI 36.10 (Zepbound start weight 3/5/24)  Current 190  Change -23.6 (-10%)  Goal weight: 150 lbs    - Weight not at goal  - Patient is interested in Conservative Program  - Labs reviewed: As below.    General Recommendations:  Nutrition:  Eat breakfast daily.  Do not skip meals.     Food log (ie.) www.myfitnesspal.com, sparkpeople.com, loseit.com, calorieking.com, etc.    Practice mindful eating.  Be sure to set aside time to eat, eat slowly, and savor your food.    Hydration:    At least 64oz of water daily.  No sugar sweetened beverages.  No juice (eat the fruit instead).    Exercise:  Studies have shown that the ideal exercise goal is somewhere between 150 to 300 minutes of moderate intensity exercise a week.  Start with exercising 10 minutes every other day and gradually increase physical activity with a goal of at least 150 minutes of moderate intensity exercise a week, divided over at least 3 days a week.  An example of this would be exercising 30 minutes a day, 5 days a week.  Resistance training can increase muscle mass and increase our resting metabolic rate.   FULL BODY resistance training is recommended 2-3 times a week.  Do not do this on consecutive days to allow for muscle recovery.    Aim for a bare minimum 5000 steps, even on days you do not exercise.    Monitoring:   Weigh yourself daily.  If this causes undue stress, then just weigh yourself once a week.  Weigh yourself the same time of the day with the same amount of clothing on.  Preferably this should be done after waking up, before you eat, and with no clothing or minimal clothing on.    Specific Goals:  Food log (ie.) www.myfitnesspal.com,sparkpeople.com,loseit.com,calorieking.com,etc.     No sugary beverages. At least 64oz of water daily.    Increase physical activity by 10 minutes daily    Increase Zepbound to 10mg weekly once you complete the supply of Zepbound 7.5 mg weekly.    Weight check  in 2 months.    Follow-up in 4 months.

## 2024-07-15 ENCOUNTER — TELEPHONE (OUTPATIENT)
Dept: BARIATRICS | Facility: CLINIC | Age: 39
End: 2024-07-15

## 2024-07-18 ENCOUNTER — TELEPHONE (OUTPATIENT)
Dept: BARIATRICS | Facility: CLINIC | Age: 39
End: 2024-07-18

## 2024-07-18 NOTE — TELEPHONE ENCOUNTER
PA for Zepbound 10 mg    Submitted via    [x]CMM-KEY [id number]  [][employer]-Case ID #    []Faxed to plan   []Other website    []Phone call Case ID #      Office notes sent, clinical questions answered. Awaiting determination    Turnaround time for your insurance to make a decision on your Prior Authorization can take 7-21 business days.

## 2024-07-18 NOTE — TELEPHONE ENCOUNTER
PA for Zepbound 10 mg Approved     Date(s) approved 7/17/24      Patient advised by          [] MyChart Message  [x] Phone call   []LMOM  []L/M to call office as no active Communication consent on file  []Unable to leave detailed message as VM not approved on Communication consent       Pharmacy advised by    []Fax  [x]Phone call    Approval letter scanned into Media Yes

## 2024-09-11 ENCOUNTER — CLINICAL SUPPORT (OUTPATIENT)
Dept: BARIATRICS | Facility: CLINIC | Age: 39
End: 2024-09-11

## 2024-09-11 VITALS
DIASTOLIC BLOOD PRESSURE: 75 MMHG | HEIGHT: 65 IN | RESPIRATION RATE: 17 BRPM | BODY MASS INDEX: 31.12 KG/M2 | HEART RATE: 83 BPM | TEMPERATURE: 97.1 F | WEIGHT: 186.8 LBS | SYSTOLIC BLOOD PRESSURE: 110 MMHG

## 2024-09-11 DIAGNOSIS — E66.9 OBESITY, CLASS I, BMI 30-34.9: ICD-10-CM

## 2024-09-11 DIAGNOSIS — R63.5 ABNORMAL WEIGHT GAIN: Primary | ICD-10-CM

## 2024-09-11 PROCEDURE — RECHECK

## 2024-09-11 RX ORDER — TIRZEPATIDE 10 MG/.5ML
10 INJECTION, SOLUTION SUBCUTANEOUS WEEKLY
Qty: 2 ML | Refills: 3 | Status: SHIPPED | OUTPATIENT
Start: 2024-09-11

## 2024-09-11 NOTE — PROGRESS NOTES
Patient last visit weight:190 LBS  Patient current visit weight:186.8 LBS    If you are taking phentermine or other oral weight loss medications, are you experiencing any of the following symptoms:  Headache:   Blurred Vision:   Chest Pain:   Palpitations:  Insomnia:   SPECIFY ORAL MEDICATION AND DOSAGE:     If you are taking an injectable medication,  are you experiencing any of the following symptoms:  Bloating:  No  Nausea: No  Vomiting:  No  Constipation:  No  Diarrhea: No  SPECIFY INJECTABLE MEDICATION AND CURRENT DOSAGE: Zepbound 10 mg  Pt asking for refills for the 10 mg dose ([facility])        Vitals:    Is BP less than 100/60? No  Is BP greater than 140/90? No  Is HR greater than 100? No  **If yes to any of the above, have patient relax and repeat in 5-10 minutes**    Repeat values:    Is BP less than 100/60?  Is BP greater than 140/90?  Is HR greater than 100?  **If values remain outside of ranges above, please consult provider for next steps**

## 2024-12-17 NOTE — PROGRESS NOTES
Assessment/Plan:  Joya was seen today for follow-up.    Diagnoses and all orders for this visit:    Obesity, Class I, BMI 30-34.9  -     tirzepatide (Zepbound) 10 mg/0.5 mL auto-injector; Inject 0.5 mL (10 mg total) under the skin once a week         Highest weight: 230 lbs  Zepbound start weight (3/5/24): 213.6 lbs BMI 36.10   Current:  177 lbs BMI BMI 30.01 (12/18/24)  Change  -36 lbs (-16/9% TBW)  Goal weight: 150 lbs    - Weight not at goal  - Patient is interested in Conservative Program  - Labs reviewed: As below.    General Recommendations:  Nutrition:  Eat breakfast daily.  Do not skip meals.     Food log (ie.) www.[employer].com, sparkpeople.com, loseit.com, calorieking.com, etc.    Practice mindful eating.  Be sure to set aside time to eat, eat slowly, and savor your food.    Hydration:    At least 64oz of water daily.  No sugar sweetened beverages.  No juice (eat the fruit instead).    Exercise:  Studies have shown that the ideal exercise goal is somewhere between 150 to 300 minutes of moderate intensity exercise a week.  Start with exercising 10 minutes every other day and gradually increase physical activity with a goal of at least 150 minutes of moderate intensity exercise a week, divided over at least 3 days a week.  An example of this would be exercising 30 minutes a day, 5 days a week.  Resistance training can increase muscle mass and increase our resting metabolic rate.   FULL BODY resistance training is recommended 2-3 times a week.  Do not do this on consecutive days to allow for muscle recovery.    Aim for a bare minimum 5000 steps, even on days you do not exercise.    Monitoring:   Weigh yourself daily.  If this causes undue stress, then just weigh yourself once a week.  Weigh yourself the same time of the day with the same amount of clothing on.  Preferably this should be done after waking up, before you eat, and with no clothing or minimal clothing on.    Specific Goals:  Calorie  res  Physical activity goals  AOM  Continue zepbound 10mg  - Patient denies personal history of pancreatitis. Patient also denies personal and family history of thyroid cancer and multiple endocrine neoplasia type 2 (MEN 2 tumor).   RTC: 4 months     ____________________      Subjective:   Chief Complaint   Patient presents with    Follow-up     Mwm 5mth f/u     Patient here to discuss weight associated problems and nutrition goals  HPI: Joya Sánchez  is a 39 y.o. female with history of excess weight.  Weight loss plan:  Conservative Program.   Most recent notes and records were reviewed.    Currently on zepbound 10mg  Patient reports higher fatty foods will cause diarrhea. Will take pepto bismol    No nausea or vomiting.    Eating 3 meals/day. No snacks.     Wt Readings from Last 10 Encounters:   12/18/24 80.6 kg (177 lb 9.6 oz)   09/11/24 84.7 kg (186 lb 12.8 oz)   07/11/24 86.5 kg (190 lb 12.8 oz)   05/10/24 91.4 kg (201 lb 6.4 oz)   03/22/24 94.7 kg (208 lb 10.7 oz)   03/05/24 96.9 kg (213 lb 9.6 oz)   01/02/24 103 kg (226 lb)   11/20/23 99.3 kg (219 lb)   11/13/23 95.3 kg (210 lb)   01/18/23 95.1 kg (209 lb 9.6 oz)       Hunger/Cravings:  Reduced  Improved knee pain  Dining out: Limited  Hydration: Adequate  Weight Monitoring: Yes      Patient denies personal and family history of pancreatitis, thyroid cancer, MEN-2 tumors.    The following portions of the patient's history were reviewed and updated as appropriate: allergies, current medications, past family history, past medical history, past social history, past surgical history, and problem list.    Review Of Systems:  Review of Systems   Constitutional:  Negative for activity change, appetite change, fatigue and fever.   Respiratory:  Negative for cough and shortness of breath.    Cardiovascular:  Negative for chest pain, palpitations and leg swelling.   Gastrointestinal:  Negative for abdominal pain, constipation, diarrhea, nausea and vomiting.  "  Endocrine: Negative for cold intolerance and heat intolerance.   Genitourinary:  Negative for difficulty urinating and dysuria.   Musculoskeletal:  Negative for arthralgias, back pain and gait problem.   Skin:  Negative for pallor and rash.   Neurological:  Negative for headaches.   Psychiatric/Behavioral:  Negative for dysphoric mood, sleep disturbance and suicidal ideas (or HI). The patient is not nervous/anxious.        Objective:  /80 (Patient Position: Sitting, Cuff Size: Standard)   Pulse 102   Temp 98.1 °F (36.7 °C) (Tympanic)   Ht 5' 4.5\" (1.638 m)   Wt 80.6 kg (177 lb 9.6 oz)   BMI 30.01 kg/m²   Physical Exam  Vitals and nursing note reviewed.   Constitutional:       General: She is not in acute distress.     Appearance: Normal appearance. She is not ill-appearing or diaphoretic.   Eyes:      General: No scleral icterus.  Cardiovascular:      Rate and Rhythm: Normal rate and regular rhythm.      Pulses: Normal pulses.      Heart sounds: No murmur heard.  Pulmonary:      Effort: Pulmonary effort is normal. No respiratory distress.      Breath sounds: Normal breath sounds. No wheezing or rhonchi.   Musculoskeletal:      Cervical back: Neck supple.      Right lower leg: No edema.      Left lower leg: No edema.   Lymphadenopathy:      Cervical: No cervical adenopathy.   Skin:     Capillary Refill: Capillary refill takes less than 2 seconds.      Findings: No lesion or rash.   Neurological:      Mental Status: She is alert and oriented to person, place, and time.      Gait: Gait normal.   Psychiatric:         Mood and Affect: Mood normal.         Behavior: Behavior normal.       Labs and Imaging  Recent labs and imaging have been personally reviewed.  Lab Results   Component Value Date    WBC 4.4 01/03/2024    HGB 14.3 01/03/2024    HCT 42.5 01/03/2024    MCV 95.3 01/03/2024     01/03/2024     Lab Results   Component Value Date    SODIUM 137 01/03/2024    K 4.0 01/03/2024     01/03/2024 "    CO2 29 01/03/2024    BUN 21 01/03/2024    CREATININE 0.71 01/03/2024    GLUC 89 01/03/2024    CALCIUM 9.4 01/03/2024    AST 15 01/03/2024    ALT 20 01/03/2024    ALKPHOS 43 01/03/2024    TP 7.2 01/03/2024    TBILI 0.4 01/03/2024    EGFR 112 01/03/2024     Lab Results   Component Value Date    HGBA1C 5.2 03/08/2024     Lab Results   Component Value Date    TSH 2.53 01/03/2024     Lab Results   Component Value Date    CHOLESTEROL 195 01/03/2024     Lab Results   Component Value Date    HDL 58 01/03/2024     Lab Results   Component Value Date    TRIG 91 01/03/2024     Lab Results   Component Value Date    LDLCALC 118 (H) 01/03/2024

## 2024-12-18 ENCOUNTER — OFFICE VISIT (OUTPATIENT)
Age: 39
End: 2024-12-18
Payer: COMMERCIAL

## 2024-12-18 ENCOUNTER — OFFICE VISIT (OUTPATIENT)
Dept: FAMILY MEDICINE CLINIC | Facility: CLINIC | Age: 39
End: 2024-12-18
Payer: COMMERCIAL

## 2024-12-18 VITALS
TEMPERATURE: 98.4 F | HEIGHT: 65 IN | WEIGHT: 180 LBS | DIASTOLIC BLOOD PRESSURE: 80 MMHG | RESPIRATION RATE: 16 BRPM | HEART RATE: 96 BPM | BODY MASS INDEX: 29.99 KG/M2 | SYSTOLIC BLOOD PRESSURE: 120 MMHG | OXYGEN SATURATION: 98 %

## 2024-12-18 VITALS
HEART RATE: 102 BPM | SYSTOLIC BLOOD PRESSURE: 120 MMHG | BODY MASS INDEX: 29.59 KG/M2 | DIASTOLIC BLOOD PRESSURE: 80 MMHG | HEIGHT: 65 IN | WEIGHT: 177.6 LBS | TEMPERATURE: 98.1 F

## 2024-12-18 DIAGNOSIS — E66.811 OBESITY, CLASS I, BMI 30-34.9: ICD-10-CM

## 2024-12-18 DIAGNOSIS — E66.09 CLASS 1 OBESITY DUE TO EXCESS CALORIES WITHOUT SERIOUS COMORBIDITY WITH BODY MASS INDEX (BMI) OF 31.0 TO 31.9 IN ADULT: ICD-10-CM

## 2024-12-18 DIAGNOSIS — H01.111 ALLERGIC DERMATITIS OF BOTH UPPER EYELIDS: Primary | ICD-10-CM

## 2024-12-18 DIAGNOSIS — H01.114 ALLERGIC DERMATITIS OF BOTH UPPER EYELIDS: Primary | ICD-10-CM

## 2024-12-18 DIAGNOSIS — E66.811 CLASS 1 OBESITY DUE TO EXCESS CALORIES WITHOUT SERIOUS COMORBIDITY WITH BODY MASS INDEX (BMI) OF 31.0 TO 31.9 IN ADULT: ICD-10-CM

## 2024-12-18 PROBLEM — M22.2X1 PATELLOFEMORAL DISORDER OF RIGHT KNEE: Status: RESOLVED | Noted: 2020-10-15 | Resolved: 2024-12-18

## 2024-12-18 PROCEDURE — 99213 OFFICE O/P EST LOW 20 MIN: CPT | Performed by: FAMILY MEDICINE

## 2024-12-18 PROCEDURE — 99213 OFFICE O/P EST LOW 20 MIN: CPT | Performed by: PHYSICIAN ASSISTANT

## 2024-12-18 RX ORDER — TIRZEPATIDE 10 MG/.5ML
10 INJECTION, SOLUTION SUBCUTANEOUS WEEKLY
Qty: 2 ML | Refills: 3 | Status: SHIPPED | OUTPATIENT
Start: 2024-12-18

## 2024-12-18 NOTE — PATIENT INSTRUCTIONS
Agree with the use of Zyrtec for allergic symptoms that included swelling of her eyes yesterday.  Hard to tell what causes it.  Continue to use Zyrtec as needed for swelling and also for itching.  Congratulated on nice weight loss.  Keep up the good work.  Return as needed.

## 2024-12-18 NOTE — PROGRESS NOTES
"Chief Complaint   Patient presents with    Eye Problem     Left eye lid puffy, red and itchy 1 + day        HPI   Here because her eyes were swollen yesterday.  Better today.  Woke up with a swollen eyes.  Took Zyrtec.  2 new exposures.  Got new nails placed 3 nights ago.  Also use a new hot sauce.  Notes that she has been on Zepbound since March and has lost about 46 pounds.  Has a goal weight of 148.    Past Medical History:   Diagnosis Date    Anxiety disorder 10/06/2015    Class 1 obesity in adult 2024    Peak weight 226.  After Zepbound, 180.      Varicella     childhood        Past Surgical History:   Procedure Laterality Date    WISDOM TOOTH EXTRACTION         Social History     Tobacco Use    Smoking status: Former     Current packs/day: 0.00     Average packs/day: 0.5 packs/day for 10.0 years (5.0 ttl pk-yrs)     Types: Cigarettes     Start date: 2001     Quit date: 2011     Years since quittin.7    Smokeless tobacco: Never   Substance Use Topics    Alcohol use: Not Currently       Social History     Social History Narrative     since .    2 children-4 and 5 as of . 2 boys.    Homemaker at the moment.  Was a nurse at [employer].     is a .  Flies for [employer].    Enjoys reading and not playing with her kids.        The following portions of the patient's history were reviewed and updated as appropriate: allergies, current medications, past family history, past medical history, past social history, past surgical history, and problem list.      Review of Systems       /80   Pulse 96   Temp 98.4 °F (36.9 °C) (Temporal)   Resp 16   Ht 5' 4.5\" (1.638 m)   Wt 81.6 kg (180 lb)   LMP 2024 (Exact Date)   SpO2 98%   BMI 30.42 kg/m²      Physical Exam   Picture of yesterday    Today                  Current Outpatient Medications:     Cholecalciferol (VitaJoy Daily D Gummies) 25 MCG (1000 UT) CHEW, Chew, Disp: , Rfl:     Multiple Vitamin " (multivitamin) capsule, Take 1 capsule by mouth daily, Disp: , Rfl:     tirzepatide (Zepbound) 10 mg/0.5 mL auto-injector, Inject 0.5 mL (10 mg total) under the skin once a week, Disp: 2 mL, Rfl: 3     No problem-specific Assessment & Plan notes found for this encounter.       Diagnoses and all orders for this visit:    Allergic dermatitis of both upper eyelids    Class 1 obesity due to excess calories without serious comorbidity with body mass index (BMI) of 31.0 to 31.9 in adult        Patient Instructions   Agree with the use of Zyrtec for allergic symptoms that included swelling of her eyes yesterday.  Hard to tell what causes it.  Continue to use Zyrtec as needed for swelling and also for itching.  Congratulated on nice weight loss.  Keep up the good work.  Return as needed.

## 2024-12-23 ENCOUNTER — TELEPHONE (OUTPATIENT)
Age: 39
End: 2024-12-23

## 2024-12-23 NOTE — TELEPHONE ENCOUNTER
Patient called in because she was notified by her insurance that her zepbound PA has . They told her they can not use Covermymeds but that the site [email or web address] is where prior auth can be done. If needed, fax number is [phone]. She still has about a month of the medication left.

## 2024-12-24 ENCOUNTER — TELEPHONE (OUTPATIENT)
Age: 39
End: 2024-12-24

## 2025-01-16 ENCOUNTER — TELEPHONE (OUTPATIENT)
Age: 40
End: 2025-01-16

## 2025-01-16 NOTE — TELEPHONE ENCOUNTER
Pt called in regarding to a prior auth. Pt states will try to send some paper work through [employer] or fax over the practice that may help to accelerate the process. Pt states the PA needs to be submitted through [email or web address].     Pt called in a second time. Warm-transferred to the practice ( [facility]).

## 2025-01-16 NOTE — TELEPHONE ENCOUNTER
Patient calling in stating she has [employer] and would like her PA sent through them. She states her member ID through them is the same as her Circ[employer] member ID

## 2025-01-21 NOTE — TELEPHONE ENCOUNTER
Vinh from RX benefits called to assist pt with her PA for Zepbound. The PA needs to be submitted through the [email or web address] Portal.  The portal is [email or web address]  Please resubmit the PA for patient since they did not receive it yet.

## 2025-01-21 NOTE — TELEPHONE ENCOUNTER
PA for Zepbound SUBMITTED to [employer]    Via     []CMM-KEY:   []Surescripts-Case ID #   []Availity-Auth ID # NDC #   []Faxed to plan   []Other website   []Phone call Case ID #     []PA sent as URGENT    All office notes, labs and other pertaining documents and studies sent. Clinical questions answered. Awaiting determination from insurance company.     Turnaround time for your insurance to make a decision on your Prior Authorization can take 7-21 business days.

## 2025-04-30 ENCOUNTER — OFFICE VISIT (OUTPATIENT)
Age: 40
End: 2025-04-30
Payer: COMMERCIAL

## 2025-04-30 VITALS
HEIGHT: 65 IN | WEIGHT: 171 LBS | BODY MASS INDEX: 28.49 KG/M2 | OXYGEN SATURATION: 98 % | SYSTOLIC BLOOD PRESSURE: 126 MMHG | DIASTOLIC BLOOD PRESSURE: 78 MMHG | TEMPERATURE: 97.8 F | HEART RATE: 87 BPM

## 2025-04-30 DIAGNOSIS — E66.3 OVERWEIGHT: Primary | ICD-10-CM

## 2025-04-30 DIAGNOSIS — E66.811 OBESITY, CLASS I, BMI 30-34.9: ICD-10-CM

## 2025-04-30 PROCEDURE — 99213 OFFICE O/P EST LOW 20 MIN: CPT | Performed by: PHYSICIAN ASSISTANT

## 2025-04-30 RX ORDER — TIRZEPATIDE 10 MG/.5ML
10 INJECTION, SOLUTION SUBCUTANEOUS WEEKLY
Qty: 2 ML | Refills: 3 | Status: SHIPPED | OUTPATIENT
Start: 2025-04-30

## 2025-04-30 NOTE — PROGRESS NOTES
*Assessment/Plan:  Joya was seen today for follow-up.    Diagnoses and all orders for this visit:    Overweight  -     Basic metabolic panel; Future  -     CBC and Platelet; Future  -     Lipid panel; Future  -     Basic metabolic panel  -     CBC and Platelet  -     Lipid panel    Obesity, Class I, BMI 30-34.9  -     tirzepatide (Zepbound) 10 mg/0.5 mL auto-injector; Inject 0.5 mL (10 mg total) under the skin once a week           Highest weight: 230 lbs  Zepbound start weight (3/5/24): 213.6 lbs BMI 36.10   Last visit weight:  177 lbs BMI BMI 30.01 (12/18/24)  Current weight: 171 lbs BMI 28.9 (4/30/25)  Change   -59 lbs overall (-42 lbs on zepbound)  Goal weight: 150 lbs    - Weight not at goal  - Patient is interested in Conservative Program  - Labs reviewed: As below.    General Recommendations:  Nutrition:  Eat breakfast daily.  Do not skip meals.     Food log (ie.) www.myfitnesspal.com, sparkpeople.com, loseit.com, calorieking.com, etc.    Practice mindful eating.  Be sure to set aside time to eat, eat slowly, and savor your food.    Hydration:    At least 64oz of water daily.  No sugar sweetened beverages.  No juice (eat the fruit instead).    Exercise:  Studies have shown that the ideal exercise goal is somewhere between 150 to 300 minutes of moderate intensity exercise a week.  Start with exercising 10 minutes every other day and gradually increase physical activity with a goal of at least 150 minutes of moderate intensity exercise a week, divided over at least 3 days a week.  An example of this would be exercising 30 minutes a day, 5 days a week.  Resistance training can increase muscle mass and increase our resting metabolic rate.   FULL BODY resistance training is recommended 2-3 times a week.  Do not do this on consecutive days to allow for muscle recovery.    Aim for a bare minimum 5000 steps, even on days you do not exercise.    Monitoring:   Weigh yourself daily.  If this causes undue stress, then  just weigh yourself once a week.  Weigh yourself the same time of the day with the same amount of clothing on.  Preferably this should be done after waking up, before you eat, and with no clothing or minimal clothing on.    Specific Goals:  Calorie deficit  Coordinate RD body stats  Discussed adding a protein shake after crossfit activities  Physical activity goals  AOM  Continue zepbound 10mg  - Patient denies personal history of pancreatitis. Patient also denies personal and family history of thyroid cancer and multiple endocrine neoplasia type 2 (MEN 2 tumor).   RTC: 4 months     ____________________      Subjective:   Chief Complaint   Patient presents with    Follow-up     4 MO MWM F/U   Waist- 33     Patient here to discuss weight associated problems and nutrition goals  HPI: Joya Sánchez  is a 40 y.o. female with history of excess weight.  Weight loss plan:  Conservative Program.   Most recent notes and records were reviewed.    Currently on zepbound 10mg  Patient reports higher fatty foods will cause diarrhea. Will take pepto bismol    No nausea or vomiting.    Eating 3 meals/day. No snacks.     Crossfit 2-3x/week. Running 3x/week     B:  2 hard boiled eggs + toast or half bagel + cream cheese. OR cup of greek yogurt  *crossfit* --> occasionally will have fruit snacks, jelly beans  L: sandwich (turkey, lettuce, [place]/mustard)   D: chicken leg/turkey burger/meatballs + noodles/mashed potato + veggie       Wt Readings from Last 10 Encounters:   04/30/25 77.6 kg (171 lb)   12/18/24 81.6 kg (180 lb)   12/18/24 80.6 kg (177 lb 9.6 oz)   09/11/24 84.7 kg (186 lb 12.8 oz)   07/11/24 86.5 kg (190 lb 12.8 oz)   05/10/24 91.4 kg (201 lb 6.4 oz)   03/22/24 94.7 kg (208 lb 10.7 oz)   03/05/24 96.9 kg (213 lb 9.6 oz)   01/02/24 103 kg (226 lb)   11/20/23 99.3 kg (219 lb)       Hunger/Cravings:  Reduced  Improved knee pain  Dining out: Limited  Hydration: Adequate  Weight Monitoring: Yes      Patient denies personal and  "family history of pancreatitis, thyroid cancer, MEN-2 tumors.    The following portions of the patient's history were reviewed and updated as appropriate: allergies, current medications, past family history, past medical history, past social history, past surgical history, and problem list.    Review Of Systems:  Review of Systems   Constitutional:  Negative for activity change, appetite change, fatigue and fever.   Respiratory:  Negative for cough and shortness of breath.    Cardiovascular:  Negative for chest pain, palpitations and leg swelling.   Gastrointestinal:  Negative for abdominal pain, constipation, diarrhea, nausea and vomiting.   Endocrine: Negative for cold intolerance and heat intolerance.   Genitourinary:  Negative for difficulty urinating and dysuria.   Musculoskeletal:  Negative for arthralgias, back pain and gait problem.   Skin:  Negative for pallor and rash.   Neurological:  Negative for headaches.   Psychiatric/Behavioral:  Negative for dysphoric mood, sleep disturbance and suicidal ideas (or HI). The patient is not nervous/anxious.        Objective:  /78   Pulse 87   Temp 97.8 °F (36.6 °C)   Ht 5' 4.5\" (1.638 m)   Wt 77.6 kg (171 lb)   SpO2 98%   BMI 28.90 kg/m²   Physical Exam  Vitals and nursing note reviewed.   Constitutional:       General: She is not in acute distress.     Appearance: Normal appearance. She is not ill-appearing or diaphoretic.   Eyes:      General: No scleral icterus.  Cardiovascular:      Rate and Rhythm: Normal rate and regular rhythm.      Pulses: Normal pulses.      Heart sounds: No murmur heard.  Pulmonary:      Effort: Pulmonary effort is normal. No respiratory distress.      Breath sounds: Normal breath sounds. No wheezing or rhonchi.   Musculoskeletal:      Cervical back: Neck supple.      Right lower leg: No edema.      Left lower leg: No edema.   Lymphadenopathy:      Cervical: No cervical adenopathy.   Skin:     Capillary Refill: Capillary refill " takes less than 2 seconds.      Findings: No lesion or rash.   Neurological:      Mental Status: She is alert and oriented to person, place, and time.      Gait: Gait normal.   Psychiatric:         Mood and Affect: Mood normal.         Behavior: Behavior normal.       Labs and Imaging  Recent labs and imaging have been personally reviewed.  Lab Results   Component Value Date    WBC 4.4 01/03/2024    HGB 14.3 01/03/2024    HCT 42.5 01/03/2024    MCV 95.3 01/03/2024     01/03/2024     Lab Results   Component Value Date    SODIUM 137 01/03/2024    K 4.0 01/03/2024     01/03/2024    CO2 29 01/03/2024    BUN 21 01/03/2024    CREATININE 0.71 01/03/2024    GLUC 89 01/03/2024    CALCIUM 9.4 01/03/2024    AST 15 01/03/2024    ALT 20 01/03/2024    ALKPHOS 43 01/03/2024    TP 7.2 01/03/2024    TBILI 0.4 01/03/2024    EGFR 112 01/03/2024     Lab Results   Component Value Date    HGBA1C 5.2 03/08/2024     Lab Results   Component Value Date    TSH 2.53 01/03/2024     Lab Results   Component Value Date    CHOLESTEROL 195 01/03/2024     Lab Results   Component Value Date    HDL 58 01/03/2024     Lab Results   Component Value Date    TRIG 91 01/03/2024     Lab Results   Component Value Date    LDLCALC 118 (H) 01/03/2024

## 2025-05-06 ENCOUNTER — OFFICE VISIT (OUTPATIENT)
Dept: FAMILY MEDICINE CLINIC | Facility: CLINIC | Age: 40
End: 2025-05-06
Payer: COMMERCIAL

## 2025-05-06 DIAGNOSIS — J45.990 EXERCISE-INDUCED ASTHMA: ICD-10-CM

## 2025-05-06 DIAGNOSIS — Z00.00 PHYSICAL EXAM, ANNUAL: Primary | ICD-10-CM

## 2025-05-06 DIAGNOSIS — Z12.31 BREAST CANCER SCREENING BY MAMMOGRAM: ICD-10-CM

## 2025-05-06 PROCEDURE — 99396 PREV VISIT EST AGE 40-64: CPT | Performed by: NURSE PRACTITIONER

## 2025-05-06 RX ORDER — ALBUTEROL SULFATE 90 UG/1
2 INHALANT RESPIRATORY (INHALATION) EVERY 6 HOURS PRN
Qty: 18 G | Refills: 0 | Status: SHIPPED | OUTPATIENT
Start: 2025-05-06

## 2025-05-06 NOTE — PROGRESS NOTES
Adult Annual Physical  Name: Joya Sánchez      : 1985      MRN: [id number]  Encounter Provider: REED Cardenas  Encounter Date: 2025   Encounter department: [facility]    :  Assessment & Plan  Physical exam, annual         Breast cancer screening by mammogram    Orders:  •  Mammo screening bilateral w 3d and cad; Future    Exercise-induced asthma    Orders:  •  albuterol (ProAir HFA) 90 mcg/act inhaler; Inhale 2 puffs every 6 (six) hours as needed for wheezing        Preventive Screenings:  - Diabetes Screening: screening up-to-date  - Cholesterol Screening: screening up-to-date   - Hepatitis C screening: screening up-to-date   - HIV screening: screening up-to-date   - Cervical cancer screening: screening up-to-date   - Breast cancer screening: orders placed   - Colon cancer screening: screening not indicated   - Lung cancer screening: screening not indicated     Immunizations:  - Immunizations due: Prevnar 20    Counseling/Anticipatory Guidance:    - Dental health: discussed importance of regular tooth brushing, flossing, and dental visits.   - Diet: discussed recommendations for a healthy/well-balanced diet.   - Exercise: the importance of regular exercise/physical activity was discussed. Recommend exercise 3-5 times per week for at least 30 minutes.       Depression Screening and Follow-up Plan: Patient was screened for depression during today's encounter. They screened negative with a PHQ-2 score of 0.          History of Present Illness     Adult Annual Physical:  Patient presents for annual physical. Joya Sánchez is a 40 year old female presenting today for annual exam.    Zepbound, lost 53 pounds.   Plateau right now.   Feeling much better, more energy.   Eating for fuel.   Can walk away from food, could never do that before.     More anxiety since started zepbound.  Panic attacks on highways.  Started counseling. Doing better.   Counselor has ridden in the car with her,  "noted her driving doesn't change, it is not a safety issue.   She continues to work on this.     Doing Cross fit and running for exercise.   Working with Nutritionist.    No smoking.   1-2 alcoholic drinks per year.     Gyn scheduled in June.     Wheezing at end of run.    Notes she is really trying to focus on her health now.  .     Diet and Physical Activity:  - Diet/Nutrition: well balanced diet, portion control, limited junk food and consuming 3-5 servings of fruits/vegetables daily.  - Exercise: vigorous cardiovascular exercise, 5-7 times a week on average and 30-60 minutes on average. 2-3 Crossfit classes a week and 3 times a week 30 min jogging/running    Depression Screening:  - PHQ-2 Score: 0    General Health:  - Sleep: 7-8 hours of sleep on average.  - Hearing: normal hearing right ear and normal hearing left ear.  - Vision: no vision problems.  - Dental: regular dental visits, brushes teeth twice daily and floss regularly.    /GYN Health:  - Follows with GYN: yes.   - Menopause: premenopausal.   - Last menstrual cycle: 4/28/2025.   - History of STDs: no    Advanced Care Planning:  - Has an advanced directive?: no    - Has a durable medical POA?: no      Review of Systems   Constitutional: Negative.    HENT: Negative.     Respiratory: Negative.     Cardiovascular: Negative.    Gastrointestinal: Negative.    Genitourinary: Negative.    Musculoskeletal: Negative.    Skin: Negative.    Neurological: Negative.    Hematological: Negative.    Psychiatric/Behavioral: Negative.         Objective   /70   Pulse 73   Temp 98 °F (36.7 °C) (Temporal)   Resp 16   Ht 5' 4.5\" (1.638 m)   Wt 78.9 kg (174 lb)   LMP 04/28/2025   SpO2 97%   Breastfeeding No   BMI 29.41 kg/m²     Physical Exam  Vitals and nursing note reviewed.   Constitutional:       General: She is not in acute distress.     Appearance: Normal appearance. She is not ill-appearing.   HENT:      Head: Atraumatic.      Right Ear: Tympanic " membrane normal.      Left Ear: Tympanic membrane normal.      Mouth/Throat:      Mouth: Mucous membranes are moist.      Pharynx: Oropharynx is clear.   Eyes:      Conjunctiva/sclera: Conjunctivae normal.      Pupils: Pupils are equal, round, and reactive to light.   Cardiovascular:      Rate and Rhythm: Normal rate and regular rhythm.      Heart sounds: No murmur heard.  Pulmonary:      Effort: Pulmonary effort is normal. No respiratory distress.      Breath sounds: Normal breath sounds. No wheezing or rales.   Musculoskeletal:      Cervical back: Normal range of motion and neck supple.      Right lower leg: No edema.      Left lower leg: No edema.   Lymphadenopathy:      Cervical: No cervical adenopathy.   Skin:     General: Skin is warm and dry.      Findings: No rash.   Neurological:      Mental Status: She is alert.   Psychiatric:         Mood and Affect: Mood normal.             Current Outpatient Medications:   •  albuterol (ProAir HFA) 90 mcg/act inhaler, Inhale 2 puffs every 6 (six) hours as needed for wheezing, Disp: 18 g, Rfl: 0  •  Cholecalciferol (VitaJoy Daily D Gummies) 25 MCG (1000 UT) CHEW, Chew, Disp: , Rfl:   •  Multiple Vitamin (multivitamin) capsule, Take 1 capsule by mouth daily, Disp: , Rfl:   •  tirzepatide (Zepbound) 10 mg/0.5 mL auto-injector, Inject 0.5 mL (10 mg total) under the skin once a week, Disp: 2 mL, Rfl: 3

## 2025-05-07 VITALS
WEIGHT: 174 LBS | BODY MASS INDEX: 28.99 KG/M2 | TEMPERATURE: 98 F | HEIGHT: 65 IN | OXYGEN SATURATION: 97 % | SYSTOLIC BLOOD PRESSURE: 110 MMHG | DIASTOLIC BLOOD PRESSURE: 70 MMHG | HEART RATE: 73 BPM | RESPIRATION RATE: 16 BRPM

## 2025-05-09 LAB
BUN SERPL-MCNC: 18 MG/DL (ref 7–25)
BUN/CREAT SERPL: NORMAL (CALC) (ref 6–22)
CALCIUM SERPL-MCNC: 9.2 MG/DL (ref 8.6–10.2)
CHLORIDE SERPL-SCNC: 104 MMOL/L (ref 98–110)
CHOLEST SERPL-MCNC: 151 MG/DL
CHOLEST/HDLC SERPL: 3 (CALC)
CO2 SERPL-SCNC: 29 MMOL/L (ref 20–32)
CREAT SERPL-MCNC: 0.73 MG/DL (ref 0.5–0.99)
ERYTHROCYTE [DISTWIDTH] IN BLOOD BY AUTOMATED COUNT: 12.2 % (ref 11–15)
GFR/BSA.PRED SERPLBLD CYS-BASED-ARV: 107 ML/MIN/1.73M2
GLUCOSE SERPL-MCNC: 84 MG/DL (ref 65–99)
HCT VFR BLD AUTO: 41.8 % (ref 35–45)
HDLC SERPL-MCNC: 51 MG/DL
HGB BLD-MCNC: 14.2 G/DL (ref 11.7–15.5)
LDLC SERPL CALC-MCNC: 87 MG/DL (CALC)
MCH RBC QN AUTO: 32 PG (ref 27–33)
MCHC RBC AUTO-ENTMCNC: 34 G/DL (ref 32–36)
MCV RBC AUTO: 94.1 FL (ref 80–100)
NONHDLC SERPL-MCNC: 100 MG/DL (CALC)
PLATELET # BLD AUTO: 265 THOUSAND/UL (ref 140–400)
PMV BLD REES-ECKER: 10.5 FL (ref 7.5–12.5)
POTASSIUM SERPL-SCNC: 3.8 MMOL/L (ref 3.5–5.3)
RBC # BLD AUTO: 4.44 MILLION/UL (ref 3.8–5.1)
SODIUM SERPL-SCNC: 138 MMOL/L (ref 135–146)
TRIGL SERPL-MCNC: 50 MG/DL
WBC # BLD AUTO: 5.3 THOUSAND/UL (ref 3.8–10.8)

## 2025-05-12 ENCOUNTER — RESULTS FOLLOW-UP (OUTPATIENT)
Age: 40
End: 2025-05-12

## 2025-05-12 NOTE — PROGRESS NOTES
ASSESSMENT & PLAN:   Diagnoses and all orders for this visit:    Encounter for routine gynecological examination with Papanicolaou smear of cervix  -     Liquid-based pap, screening    Encounter for well woman exam with routine gynecological exam    Encounter for screening mammogram for malignant neoplasm of breast  -     Mammo screening bilateral w 3d and cad; Future    Cervical cancer screening  -     Liquid-based pap, screening    Screening for human papillomavirus (HPV)  -     Liquid-based pap, screening      Discussed birth control options, pt wishes to proceed with Mirena IUD. Will return for IUD placement.    The following were reviewed in today's visit: ASCCP guidelines, Gardasil vaccination, STD testing mammography screening ordered and exercise.    Patient to return to office in yearly for annual exam.     All questions have been answered to her satisfaction.        CC:  Annual Gynecologic Examination  Chief Complaint   Patient presents with    Gynecologic Exam     Pt here for yearly exam w pap  Periods regular, last about 5 days  No cramping/clotting  No bladder/bowel concerns  Had 1st mammo last night at 7 PM       HPI: Joya Sánchez is a 40 y.o.  who presents for annual gynecologic examination.  She has the following concerns:  none      Health Maintenance:    Exercise: frequently  Breast exams/breast awareness: yes  Last mammogram: none prior, first one completed yesterday    Past Medical History:   Diagnosis Date    Anxiety disorder 10/06/2015    Class 1 obesity in adult 2024    Peak weight 226.  After Zepbound, 180.      Varicella     childhood       Past Surgical History:   Procedure Laterality Date    WISDOM TOOTH EXTRACTION         Past OB/Gyn History:   Patient's last menstrual period was 2025 (exact date).    Last Pap:  : no abnormalities  History of abnormal Pap smear: No  HPV vaccine completed: yes    Patient is currently sexually active.   STD testing: no  Current  contraception: none      Family History  Family History   Problem Relation Age of Onset    Depression Mother     Crohn's disease Mother     Cancer Mother         breast    Mental illness Mother         anx    Breast cancer Mother 51    No Known Problems Father     Asthma Sister     Crohn's disease Sister     Stroke Maternal Grandmother     Heart disease Maternal Grandfather         failure    Heart failure Maternal Grandfather     Prostate cancer Paternal Grandfather     No Known Problems Son     No Known Problems Son     No Known Problems Paternal Aunt     No Known Problems Paternal Aunt        Family history of uterine or ovarian cancer: no  Family history of breast cancer: yes  Family history of colon cancer: no    Social History:  Social History     Socioeconomic History    Marital status: Unknown     Spouse name: Harris Sánchez    Number of children: Not on file    Years of education: Associate    Highest education level: Not on file   Occupational History    Occupation: RN   Tobacco Use    Smoking status: Former     Current packs/day: 0.00     Average packs/day: 0.5 packs/day for 10.0 years (5.0 ttl pk-yrs)     Types: Cigarettes     Start date: 2001     Quit date: 2011     Years since quittin.1    Smokeless tobacco: Never   Vaping Use    Vaping status: Former   Substance and Sexual Activity    Alcohol use: Not Currently    Drug use: Never    Sexual activity: Yes     Partners: Male     Birth control/protection: None   Other Topics Concern    Not on file   Social History Narrative     since .    2 children-4 and 5 as of . 2 boys.    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     is a .  Flies for [employer].    Enjoys reading and not playing with her kids.     Social Drivers of Health     Financial Resource Strain: Not on file   Food Insecurity: No Food Insecurity (2025)    Nursing - Inadequate Food Risk Classification     Worried About Running Out of  "Food in the Last Year: Never true     Ran Out of Food in the Last Year: Never true     Ran Out of Food in the Last Year: Not on file   Transportation Needs: No Transportation Needs (5/7/2025)    PRAPARE - Transportation     Lack of Transportation (Medical): No     Lack of Transportation (Non-Medical): No   Physical Activity: Not on file   Stress: Not on file   Social Connections: Not on file   Intimate Partner Violence: Not on file   Housing Stability: Low Risk  (5/7/2025)    Housing Stability Vital Sign     Unable to Pay for Housing in the Last Year: No     Number of Times Moved in the Last Year: 1     Homeless in the Last Year: No     Domestic violence screen: negative    Allergies:  No Known Allergies    Medications:    Current Outpatient Medications:     albuterol (ProAir HFA) 90 mcg/act inhaler, Inhale 2 puffs every 6 (six) hours as needed for wheezing, Disp: 18 g, Rfl: 0    Cholecalciferol (VitaJoy Daily D Gummies) 25 MCG (1000 UT) CHEW, Chew, Disp: , Rfl:     Multiple Vitamin (multivitamin) capsule, Take 1 capsule by mouth in the morning., Disp: , Rfl:     tirzepatide (Zepbound) 10 mg/0.5 mL auto-injector, Inject 0.5 mL (10 mg total) under the skin once a week, Disp: 2 mL, Rfl: 3    Review of Systems:  Review of Systems      Physical Exam:  /76 (BP Location: Left arm, Patient Position: Sitting, Cuff Size: Standard)   Ht 5' 4.5\" (1.638 m)   Wt 78.4 kg (172 lb 12.8 oz)   LMP 04/28/2025 (Exact Date)   BMI 29.20 kg/m²    Physical Exam  Constitutional:       Appearance: Normal appearance.   Genitourinary:      Right Labia: No rash, lesions or skin changes.     Left Labia: No lesions, skin changes or rash.       Right Adnexa: not tender and no mass present.     Left Adnexa: not tender and no mass present.     No cervical motion tenderness, discharge or lesion.      Uterus is not enlarged or tender.   Breasts:     Right: No mass, nipple discharge, skin change or tenderness.      Left: No mass, nipple " discharge, skin change or tenderness.   HENT:      Head: Normocephalic and atraumatic.     Eyes:      Extraocular Movements: Extraocular movements intact.       Cardiovascular:      Rate and Rhythm: Normal rate and regular rhythm.   Pulmonary:      Effort: Pulmonary effort is normal.      Breath sounds: Normal breath sounds.   Abdominal:      General: There is no distension.      Palpations: Abdomen is soft.      Tenderness: There is no abdominal tenderness. There is no guarding.     Musculoskeletal:         General: Normal range of motion.     Neurological:      Mental Status: She is alert. Mental status is at baseline.     Skin:     General: Skin is warm and dry.     Psychiatric:         Mood and Affect: Mood normal.         Behavior: Behavior normal.

## 2025-05-15 ENCOUNTER — HOSPITAL ENCOUNTER (OUTPATIENT)
Dept: RADIOLOGY | Age: 40
Discharge: HOME | End: 2025-05-15
Attending: NURSE PRACTITIONER
Payer: COMMERCIAL

## 2025-05-15 DIAGNOSIS — Z12.31 BREAST CANCER SCREENING BY MAMMOGRAM: ICD-10-CM

## 2025-05-15 PROCEDURE — 77067 SCR MAMMO BI INCL CAD: CPT

## 2025-05-15 PROCEDURE — 77063 BREAST TOMOSYNTHESIS BI: CPT

## 2025-05-15 NOTE — PROGRESS NOTES
Weight Management Medical Nutrition Assessment  Joya presented for body stats package. Today's weight is 173#, down 40.6# since initial visit. On 10mg Zepbound and reports occasional diarrhea. Less emotional eating since starting injection. Did body stats package with JJ Flores in March 2024. Interest in repeating testing as she reached a weight plateau. Per recall she is doing Cross Fit/cardio multiple days per week. Finds herself not wanting to eat often and when tracking on My Fitness Pal falls below estimated needs. Reviewed active calorie needs and encouraged additional calories/protein with breakfast and snacks. Completed a body composition using SECA scale and reviewed results with patient. Reevue indirect calorimter revealed REE is fast compared to the predictive normal for someone her same age, height, and gender. Results are similar to 2024 results. Reevue Indirect Calorimeter REE:  1930          Weight loss without exercise: 1993-5145          Weight loss with exercise:  2822-5488                    Maintenance:  5494-2453            Patient seen by Medical Provider in past 6 months:  yes 4/30/25  Requested to schedule appointment with Medical Provider: No      Anthropometric Measurements  Start Weight (#): 213.6# 3/5/24  Current Weight (#): 173#  TBW % Change from start weight: 19.9%  Ideal Body Weight (#):122.5#  Goal Weight (#): 150#  Highest: 230#  Lowest: 150#    Weight Loss History  Previous weight loss attempts: Diet and Exercise, Physician Supervised Weight Loss Program, and Prescription Weight Loss Medications     Food and Nutrition Related History  Wake up: 6:30-7am   Bed Time: 10-10:30pm    Food Recall   Breakfast:7:30-8am 2 scrambled or hard boiled eggs + Chobani protein yogurt cup or 1/2 bagel with 1 tbsp cream cheese or finishes her kids oatmeal (will eat whole bagel on Cross Fit day)  Snack: 1 scoop Isopure Clear (20g protein) - after workout  Lunch:11:30-12pm turkey or ham with Primal  barahona on 2 slices sourdough bread or thin sliced [employer] Killer bread + chips or pretzels  Snack: 3pm skip  Dinner: 5-5:30pm chicken leg or thigh + frozen vegetable + potato or noodles  Snack: skip    Beverages: 64+ oz water occ with true lime stevia packet, 1 cup coffee with nut pods   Alcohol: maybe 4-5x/year     Weekends: Same   Cravings: not since started medication   Trouble area of day:none reported    Frequency of Eating out: not assessed  Food restrictions:red meat  Cooking: self   Food Shopping: self    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     Physical Activity Intake  Activity: Cross fit, 30 minute jog  Frequency: 2-3x per week  Physical limitations/barriers to exercise: n/a    Estimated Needs   Energy  SECA: BMR: 1591  [place] [employer] Energy Needs: BMR : 1456   0.5-1# loss weekly sedentary: 1089-6087             0.5-1# loss weekly lightly active: 3635-2821    0.5-1# loss weekly moderately active: 2880-5626  Maintenance calories for sedentary activity level: 1747  Protein: 67-84      (1.2-1.5g/kg IBW)  Fluid Requirement Calculator   Total Fluid: 90   oz  ([place]-Segar Method)  Free Fluid: 72 oz ([place]-Segar Method - 20%)    Nutrition Diagnosis  Yes;    Overweight/obesity  related to Excess energy intake as evidenced by  BMI more than normative standard for age and sex (overweight 25-29.9)       Nutrition Intervention    Nutrition Prescription  Calories: 4690-8893 (can go up to 1900 as needed)  Protein: 98-120g  Fluid: 72    Meal Plan (Matthew/Pro/Carb)  Breakfast: 400/30  Snack: 100-200/5-10  Lunch: 400/30-35  Snack: 100-200/5-10  Dinner: 400/30-35  Snack:     Nutrition Education:    Calorie controlled menu  Lean protein food choices  Healthy snack options  Food journaling tips      Nutrition Counseling:  Strategies: meal planning, portion sizes, healthy snack choices, hydration, fiber intake, protein intake, exercise, food journal      Monitoring and Evaluation:  Evaluation criteria:  Energy  Intake  Meet protein needs  Maintain adequate hydration  Monitor weekly weight  Meal planning/preparation  Food journal   Decreased portions at mealtimes and snacks  Physical activity     Barriers to learning:none  Readiness to change: Action:  (Changing behavior)  Comprehension: good  Expected Compliance: good

## 2025-05-16 ENCOUNTER — ANNUAL EXAM (OUTPATIENT)
Dept: OBGYN CLINIC | Facility: CLINIC | Age: 40
End: 2025-05-16

## 2025-05-16 VITALS
WEIGHT: 172.8 LBS | SYSTOLIC BLOOD PRESSURE: 110 MMHG | BODY MASS INDEX: 28.79 KG/M2 | HEIGHT: 65 IN | DIASTOLIC BLOOD PRESSURE: 76 MMHG

## 2025-05-16 DIAGNOSIS — Z12.4 CERVICAL CANCER SCREENING: ICD-10-CM

## 2025-05-16 DIAGNOSIS — Z01.419 ENCOUNTER FOR WELL WOMAN EXAM WITH ROUTINE GYNECOLOGICAL EXAM: ICD-10-CM

## 2025-05-16 DIAGNOSIS — Z11.51 SCREENING FOR HUMAN PAPILLOMAVIRUS (HPV): ICD-10-CM

## 2025-05-16 DIAGNOSIS — Z01.419 ENCOUNTER FOR ROUTINE GYNECOLOGICAL EXAMINATION WITH PAPANICOLAOU SMEAR OF CERVIX: Primary | ICD-10-CM

## 2025-05-16 DIAGNOSIS — Z12.31 ENCOUNTER FOR SCREENING MAMMOGRAM FOR MALIGNANT NEOPLASM OF BREAST: ICD-10-CM

## 2025-05-16 PROCEDURE — G0476 HPV COMBO ASSAY CA SCREEN: HCPCS | Performed by: OBSTETRICS & GYNECOLOGY

## 2025-05-16 PROCEDURE — G0145 SCR C/V CYTO,THINLAYER,RESCR: HCPCS | Performed by: OBSTETRICS & GYNECOLOGY

## 2025-05-19 ENCOUNTER — CLINICAL SUPPORT (OUTPATIENT)
Dept: BARIATRICS | Facility: CLINIC | Age: 40
End: 2025-05-19

## 2025-05-19 VITALS — BODY MASS INDEX: 28.82 KG/M2 | WEIGHT: 173 LBS | HEIGHT: 65 IN

## 2025-05-19 DIAGNOSIS — R63.5 ABNORMAL WEIGHT GAIN: Primary | ICD-10-CM

## 2025-05-19 PROCEDURE — RECHECK

## 2025-05-19 PROCEDURE — BODSTAT PR BODY STAT

## 2025-05-22 ENCOUNTER — RESULTS FOLLOW-UP (OUTPATIENT)
Dept: LABOR AND DELIVERY | Facility: HOSPITAL | Age: 40
End: 2025-05-22

## 2025-05-22 LAB
LAB AP GYN PRIMARY INTERPRETATION: NORMAL
Lab: NORMAL

## 2025-06-23 ENCOUNTER — TELEPHONE (OUTPATIENT)
Age: 40
End: 2025-06-23

## 2025-06-23 NOTE — TELEPHONE ENCOUNTER
Patient called regarding 7/3 IUD insertion appt. Pt called to clarify they are requesting the Mirena IUD device. Please ensure device availability for appt.

## 2025-06-27 ENCOUNTER — TELEPHONE (OUTPATIENT)
Dept: BARIATRICS | Facility: CLINIC | Age: 40
End: 2025-06-27

## 2025-07-03 ENCOUNTER — PROCEDURE VISIT (OUTPATIENT)
Dept: OBGYN CLINIC | Facility: CLINIC | Age: 40
End: 2025-07-03
Payer: COMMERCIAL

## 2025-07-03 VITALS
SYSTOLIC BLOOD PRESSURE: 122 MMHG | WEIGHT: 176 LBS | DIASTOLIC BLOOD PRESSURE: 76 MMHG | BODY MASS INDEX: 29.32 KG/M2 | HEIGHT: 65 IN

## 2025-07-03 DIAGNOSIS — Z30.430 ENCOUNTER FOR IUD INSERTION: Primary | ICD-10-CM

## 2025-07-03 LAB — SL AMB POCT URINE HCG: NEGATIVE

## 2025-07-03 PROCEDURE — 58300 INSERT INTRAUTERINE DEVICE: CPT | Performed by: OBSTETRICS & GYNECOLOGY

## 2025-07-03 PROCEDURE — 81025 URINE PREGNANCY TEST: CPT | Performed by: OBSTETRICS & GYNECOLOGY

## 2025-07-03 NOTE — PROGRESS NOTES
IUD Procedure    Date/Time: 7/3/2025 1:27 PM    Performed by: Suzette Enriquez MD  Authorized by: Suzette Enriquez MD    Written consent obtained?: Yes    Risks and benefits: Risks, benefits and alternatives were discussed    Consent given by:  Patient  Patient states understanding of procedure being performed: Yes    Patient's understanding of procedure matches consent: Yes    Procedure consent matches procedure scheduled: Yes    Required items: Required blood products, implants, devices and special equipment available    Patient identity confirmed:  Verbally with patient  Select procedure: IUD insertion    IUD Insertion:     Pelvic exam performed: yes      Negative urine pregnancy test: yes      Cervix cleaned and prepped: yes      Speculum placed in vagina: yes      Tenaculum applied to cervix: yes      IUD inserted with no complications: yes      Strings trimmed: yes      Uterus sounded: yes      Uterus sound depth (cm):  7    IUD type:  1 each Levonorgestrel 20 MCG/DAY  Post-procedure:     Patient tolerated procedure well: yes      Patient will follow up after next period: yes